# Patient Record
Sex: FEMALE | Race: WHITE | Employment: FULL TIME | URBAN - METROPOLITAN AREA
[De-identification: names, ages, dates, MRNs, and addresses within clinical notes are randomized per-mention and may not be internally consistent; named-entity substitution may affect disease eponyms.]

---

## 2017-02-04 ENCOUNTER — HOSPITAL ENCOUNTER (EMERGENCY)
Age: 27
Discharge: HOME | End: 2017-02-04
Attending: EMERGENCY MEDICINE
Payer: COMMERCIAL

## 2017-02-04 ENCOUNTER — APPOINTMENT (OUTPATIENT)
Dept: GENERAL RADIOLOGY | Age: 27
End: 2017-02-04
Payer: COMMERCIAL

## 2017-02-04 VITALS
DIASTOLIC BLOOD PRESSURE: 79 MMHG | WEIGHT: 140 LBS | OXYGEN SATURATION: 100 % | RESPIRATION RATE: 16 BRPM | TEMPERATURE: 98.2 F | SYSTOLIC BLOOD PRESSURE: 119 MMHG | BODY MASS INDEX: 23.9 KG/M2 | HEART RATE: 72 BPM | HEIGHT: 64 IN

## 2017-02-04 DIAGNOSIS — G89.29 CHRONIC BILATERAL LOW BACK PAIN WITH LEFT-SIDED SCIATICA: Primary | ICD-10-CM

## 2017-02-04 DIAGNOSIS — M54.42 CHRONIC BILATERAL LOW BACK PAIN WITH LEFT-SIDED SCIATICA: Primary | ICD-10-CM

## 2017-02-04 PROCEDURE — 99283 EMERGENCY DEPT VISIT LOW MDM: CPT

## 2017-02-04 PROCEDURE — 6370000000 HC RX 637 (ALT 250 FOR IP): Performed by: PHYSICIAN ASSISTANT

## 2017-02-04 PROCEDURE — 72110 X-RAY EXAM L-2 SPINE 4/>VWS: CPT

## 2017-02-04 PROCEDURE — 72110 X-RAY EXAM L-2 SPINE 4/>VWS: CPT | Performed by: RADIOLOGY

## 2017-02-04 RX ORDER — CYCLOBENZAPRINE HCL 10 MG
10 TABLET ORAL 3 TIMES DAILY PRN
Qty: 15 TABLET | Refills: 0 | Status: SHIPPED | OUTPATIENT
Start: 2017-02-04 | End: 2017-02-14

## 2017-02-04 RX ORDER — HYDROCODONE BITARTRATE AND ACETAMINOPHEN 5; 325 MG/1; MG/1
1 TABLET ORAL EVERY 6 HOURS PRN
Qty: 10 TABLET | Refills: 0 | Status: SHIPPED | OUTPATIENT
Start: 2017-02-04 | End: 2017-02-11

## 2017-02-04 RX ORDER — CYCLOBENZAPRINE HCL 10 MG
10 TABLET ORAL ONCE
Status: COMPLETED | OUTPATIENT
Start: 2017-02-04 | End: 2017-02-04

## 2017-02-04 RX ORDER — HYDROCODONE BITARTRATE AND ACETAMINOPHEN 5; 325 MG/1; MG/1
1 TABLET ORAL ONCE
Status: COMPLETED | OUTPATIENT
Start: 2017-02-04 | End: 2017-02-04

## 2017-02-04 RX ADMIN — CYCLOBENZAPRINE HYDROCHLORIDE 10 MG: 10 TABLET, FILM COATED ORAL at 17:31

## 2017-02-04 RX ADMIN — HYDROCODONE BITARTRATE AND ACETAMINOPHEN 1 TABLET: 5; 325 TABLET ORAL at 17:31

## 2017-02-04 ASSESSMENT — PAIN SCALES - GENERAL
PAINLEVEL_OUTOF10: 7
PAINLEVEL_OUTOF10: 7

## 2017-02-04 ASSESSMENT — PAIN DESCRIPTION - ORIENTATION: ORIENTATION: LOWER;LEFT

## 2017-02-04 ASSESSMENT — PAIN DESCRIPTION - FREQUENCY: FREQUENCY: INTERMITTENT

## 2017-02-04 ASSESSMENT — PAIN DESCRIPTION - PAIN TYPE: TYPE: ACUTE PAIN

## 2017-02-04 ASSESSMENT — PAIN DESCRIPTION - LOCATION: LOCATION: BACK;LEG

## 2017-02-04 ASSESSMENT — PAIN DESCRIPTION - ONSET: ONSET: ON-GOING

## 2017-02-04 ASSESSMENT — PAIN DESCRIPTION - PROGRESSION: CLINICAL_PROGRESSION: GRADUALLY WORSENING

## 2017-04-27 ENCOUNTER — HOSPITAL ENCOUNTER (EMERGENCY)
Age: 27
Discharge: HOME | End: 2017-04-27
Attending: EMERGENCY MEDICINE
Payer: COMMERCIAL

## 2017-04-27 ENCOUNTER — APPOINTMENT (OUTPATIENT)
Dept: GENERAL RADIOLOGY | Age: 27
End: 2017-04-27
Payer: COMMERCIAL

## 2017-04-27 VITALS
RESPIRATION RATE: 16 BRPM | HEART RATE: 94 BPM | OXYGEN SATURATION: 100 % | SYSTOLIC BLOOD PRESSURE: 110 MMHG | DIASTOLIC BLOOD PRESSURE: 70 MMHG | TEMPERATURE: 98.2 F

## 2017-04-27 DIAGNOSIS — M79.644 FINGER PAIN, RIGHT: Primary | ICD-10-CM

## 2017-04-27 PROCEDURE — 99283 EMERGENCY DEPT VISIT LOW MDM: CPT

## 2017-04-27 PROCEDURE — 73130 X-RAY EXAM OF HAND: CPT

## 2017-04-27 ASSESSMENT — PAIN DESCRIPTION - PAIN TYPE: TYPE: ACUTE PAIN

## 2017-04-27 ASSESSMENT — PAIN DESCRIPTION - ORIENTATION: ORIENTATION: RIGHT

## 2017-04-27 ASSESSMENT — PAIN DESCRIPTION - LOCATION: LOCATION: FINGER (COMMENT WHICH ONE)

## 2017-04-27 ASSESSMENT — PAIN SCALES - GENERAL: PAINLEVEL_OUTOF10: 3

## 2017-04-27 ASSESSMENT — PAIN DESCRIPTION - DESCRIPTORS: DESCRIPTORS: ACHING

## 2017-04-27 ASSESSMENT — PAIN DESCRIPTION - ONSET: ONSET: ON-GOING

## 2018-01-03 ENCOUNTER — HOSPITAL ENCOUNTER (EMERGENCY)
Age: 28
Discharge: HOME | End: 2018-01-03
Attending: EMERGENCY MEDICINE
Payer: COMMERCIAL

## 2018-01-03 ENCOUNTER — APPOINTMENT (OUTPATIENT)
Dept: GENERAL RADIOLOGY | Age: 28
End: 2018-01-03
Payer: COMMERCIAL

## 2018-01-03 VITALS
WEIGHT: 140 LBS | HEART RATE: 82 BPM | BODY MASS INDEX: 23.9 KG/M2 | TEMPERATURE: 97.7 F | SYSTOLIC BLOOD PRESSURE: 126 MMHG | HEIGHT: 64 IN | RESPIRATION RATE: 16 BRPM | OXYGEN SATURATION: 100 % | DIASTOLIC BLOOD PRESSURE: 76 MMHG

## 2018-01-03 DIAGNOSIS — S90.32XA CONTUSION OF LEFT FOOT, INITIAL ENCOUNTER: Primary | ICD-10-CM

## 2018-01-03 PROCEDURE — 99283 EMERGENCY DEPT VISIT LOW MDM: CPT

## 2018-01-03 PROCEDURE — 73630 X-RAY EXAM OF FOOT: CPT

## 2018-01-03 RX ORDER — IBUPROFEN 600 MG/1
600 TABLET ORAL EVERY 6 HOURS PRN
Qty: 20 TABLET | Refills: 0 | Status: SHIPPED | OUTPATIENT
Start: 2018-01-03 | End: 2018-03-22

## 2018-01-03 ASSESSMENT — PAIN DESCRIPTION - LOCATION: LOCATION: FOOT

## 2018-01-03 ASSESSMENT — PAIN DESCRIPTION - ORIENTATION: ORIENTATION: LEFT

## 2018-01-03 ASSESSMENT — PAIN DESCRIPTION - DESCRIPTORS: DESCRIPTORS: ACHING

## 2018-01-03 ASSESSMENT — PAIN DESCRIPTION - PAIN TYPE: TYPE: ACUTE PAIN

## 2018-01-03 ASSESSMENT — PAIN SCALES - GENERAL: PAINLEVEL_OUTOF10: 4
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
## 2018-01-23 ENCOUNTER — OFFICE VISIT (OUTPATIENT)
Dept: OBGYN CLINIC | Age: 28
End: 2018-01-23
Payer: COMMERCIAL

## 2018-01-23 ENCOUNTER — HOSPITAL ENCOUNTER (OUTPATIENT)
Age: 28
Setting detail: SPECIMEN
Discharge: HOME | End: 2018-01-23
Payer: COMMERCIAL

## 2018-01-23 VITALS
HEIGHT: 64 IN | SYSTOLIC BLOOD PRESSURE: 121 MMHG | HEART RATE: 75 BPM | RESPIRATION RATE: 18 BRPM | BODY MASS INDEX: 23.22 KG/M2 | DIASTOLIC BLOOD PRESSURE: 71 MMHG | WEIGHT: 136 LBS

## 2018-01-23 DIAGNOSIS — N92.6 MISSED PERIOD: Primary | ICD-10-CM

## 2018-01-23 DIAGNOSIS — Z32.01 POSITIVE PREGNANCY TEST: ICD-10-CM

## 2018-01-23 DIAGNOSIS — N92.6 MISSED PERIOD: ICD-10-CM

## 2018-01-23 LAB — HCG QUANTITATIVE: 107 IU/L

## 2018-01-23 PROCEDURE — 4004F PT TOBACCO SCREEN RCVD TLK: CPT | Performed by: CLINICAL NURSE SPECIALIST

## 2018-01-23 PROCEDURE — 81025 URINE PREGNANCY TEST: CPT | Performed by: CLINICAL NURSE SPECIALIST

## 2018-01-23 PROCEDURE — 99203 OFFICE O/P NEW LOW 30 MIN: CPT | Performed by: CLINICAL NURSE SPECIALIST

## 2018-01-23 PROCEDURE — G8427 DOCREV CUR MEDS BY ELIG CLIN: HCPCS | Performed by: CLINICAL NURSE SPECIALIST

## 2018-01-23 PROCEDURE — G8484 FLU IMMUNIZE NO ADMIN: HCPCS | Performed by: CLINICAL NURSE SPECIALIST

## 2018-01-23 PROCEDURE — G8420 CALC BMI NORM PARAMETERS: HCPCS | Performed by: CLINICAL NURSE SPECIALIST

## 2018-01-23 RX ORDER — VITAMIN A ACETATE, .BETA.-CAROTENE, ASCORBIC ACID, CHOLECALCIFEROL, .ALPHA.-TOCOPHEROL ACETATE, DL-, THIAMINE MONONITRATE, RIBOFLAVIN, NIACINAMIDE, PYRIDOXINE HYDROCHLORIDE, FOLIC ACID, CYANOCOBALAMIN, CALCIUM CARBONATE, FERROUS FUMARATE, ZINC OXIDE, AND CUPRIC OXIDE 2000; 2000; 120; 400; 22; 1.84; 3; 20; 10; 1; 12; 200; 27; 25; 2 [IU]/1; [IU]/1; MG/1; [IU]/1; MG/1; MG/1; MG/1; MG/1; MG/1; MG/1; UG/1; MG/1; MG/1; MG/1; MG/1
1 TABLET ORAL DAILY
Qty: 30 TABLET | Refills: 11 | Status: SHIPPED | OUTPATIENT
Start: 2018-01-23 | End: 2019-10-10

## 2018-01-24 DIAGNOSIS — N92.6 MISSED MENSES: Primary | ICD-10-CM

## 2018-01-24 DIAGNOSIS — Z32.01 POSITIVE PREGNANCY TEST: ICD-10-CM

## 2018-01-25 ENCOUNTER — NURSE ONLY (OUTPATIENT)
Dept: OBGYN CLINIC | Age: 28
End: 2018-01-25

## 2018-01-25 ENCOUNTER — HOSPITAL ENCOUNTER (OUTPATIENT)
Age: 28
Setting detail: SPECIMEN
Discharge: HOME | End: 2018-01-25
Payer: COMMERCIAL

## 2018-01-25 DIAGNOSIS — N92.6 MISSED MENSES: ICD-10-CM

## 2018-01-25 LAB — HCG QUANTITATIVE: 248 IU/L

## 2018-01-29 ENCOUNTER — HOSPITAL ENCOUNTER (OUTPATIENT)
Age: 28
Setting detail: SPECIMEN
Discharge: HOME | End: 2018-01-29
Payer: COMMERCIAL

## 2018-01-29 ENCOUNTER — NURSE ONLY (OUTPATIENT)
Dept: OBGYN CLINIC | Age: 28
End: 2018-01-29

## 2018-01-29 DIAGNOSIS — O20.0 THREATENED ABORTION: Primary | ICD-10-CM

## 2018-01-29 DIAGNOSIS — O20.0 THREATENED ABORTION: ICD-10-CM

## 2018-01-29 LAB — HCG QUANTITATIVE: 1092 IU/L

## 2018-02-05 ENCOUNTER — NURSE ONLY (OUTPATIENT)
Dept: OBGYN CLINIC | Age: 28
End: 2018-02-05

## 2018-02-05 ENCOUNTER — HOSPITAL ENCOUNTER (OUTPATIENT)
Age: 28
Setting detail: SPECIMEN
Discharge: HOME | End: 2018-02-05
Payer: COMMERCIAL

## 2018-02-05 DIAGNOSIS — O20.0 THREATENED ABORTION: Primary | ICD-10-CM

## 2018-02-05 DIAGNOSIS — O20.0 THREATENED ABORTION: ICD-10-CM

## 2018-02-05 LAB — HCG QUANTITATIVE: ABNORMAL IU/L

## 2018-02-07 ENCOUNTER — TELEPHONE (OUTPATIENT)
Dept: OBGYN CLINIC | Age: 28
End: 2018-02-07

## 2018-02-07 RX ORDER — FLUCONAZOLE 100 MG/1
100 TABLET ORAL DAILY
Qty: 7 TABLET | Refills: 0 | Status: SHIPPED | OUTPATIENT
Start: 2018-02-07 | End: 2018-02-14

## 2018-02-07 RX ORDER — METRONIDAZOLE 500 MG/1
500 TABLET ORAL 2 TIMES DAILY
Qty: 14 TABLET | Refills: 0 | Status: SHIPPED | OUTPATIENT
Start: 2018-02-07 | End: 2018-02-14

## 2018-02-12 ENCOUNTER — PROCEDURE VISIT (OUTPATIENT)
Dept: OBGYN CLINIC | Age: 28
End: 2018-02-12
Payer: COMMERCIAL

## 2018-02-12 DIAGNOSIS — Z32.01 POSITIVE PREGNANCY TEST: ICD-10-CM

## 2018-02-12 DIAGNOSIS — O36.80X0 PREGNANCY WITH INCONCLUSIVE FETAL VIABILITY, NOT APPLICABLE OR UNSPECIFIED FETUS: Primary | ICD-10-CM

## 2018-02-12 PROCEDURE — 76817 TRANSVAGINAL US OBSTETRIC: CPT | Performed by: SPECIALIST

## 2018-02-28 ENCOUNTER — TELEPHONE (OUTPATIENT)
Dept: OBGYN CLINIC | Age: 28
End: 2018-02-28

## 2018-02-28 DIAGNOSIS — R05.9 COUGH: ICD-10-CM

## 2018-02-28 DIAGNOSIS — R11.2 NAUSEA AND VOMITING, INTRACTABILITY OF VOMITING NOT SPECIFIED, UNSPECIFIED VOMITING TYPE: Primary | ICD-10-CM

## 2018-02-28 NOTE — TELEPHONE ENCOUNTER
9 wk OB has cough cold non prod, headache, swollen glands in neck, also having morning sickness vomiting temp unknown  Pharm [facility]

## 2018-03-01 RX ORDER — PROMETHAZINE HYDROCHLORIDE 25 MG/1
25 TABLET ORAL EVERY 6 HOURS PRN
Qty: 1 TABLET | Refills: 2 | Status: ON HOLD | OUTPATIENT
Start: 2018-03-01 | End: 2018-06-01 | Stop reason: HOSPADM

## 2018-03-01 RX ORDER — GUAIFENESIN 100 MG/5ML
10 SYRUP ORAL EVERY 4 HOURS PRN
Qty: 1 BOTTLE | Refills: 0 | Status: SHIPPED | OUTPATIENT
Start: 2018-03-01 | End: 2018-07-24 | Stop reason: SDUPTHER

## 2018-03-05 RX ORDER — AZITHROMYCIN 250 MG/1
TABLET, FILM COATED ORAL
Qty: 1 PACKET | Refills: 0 | Status: SHIPPED | OUTPATIENT
Start: 2018-03-05 | End: 2018-03-15

## 2018-03-22 ENCOUNTER — INITIAL PRENATAL (OUTPATIENT)
Dept: OBGYN CLINIC | Age: 28
End: 2018-03-22

## 2018-03-22 ENCOUNTER — HOSPITAL ENCOUNTER (OUTPATIENT)
Age: 28
Setting detail: SPECIMEN
Discharge: HOME | End: 2018-03-22
Payer: COMMERCIAL

## 2018-03-22 VITALS
BODY MASS INDEX: 25.23 KG/M2 | DIASTOLIC BLOOD PRESSURE: 82 MMHG | HEART RATE: 96 BPM | WEIGHT: 147 LBS | SYSTOLIC BLOOD PRESSURE: 129 MMHG

## 2018-03-22 DIAGNOSIS — Z34.82 ENCOUNTER FOR SUPERVISION OF OTHER NORMAL PREGNANCY, SECOND TRIMESTER: Primary | ICD-10-CM

## 2018-03-22 DIAGNOSIS — Z3A.12 12 WEEKS GESTATION OF PREGNANCY: ICD-10-CM

## 2018-03-22 DIAGNOSIS — Z32.01 POSITIVE PREGNANCY TEST: ICD-10-CM

## 2018-03-22 LAB
SICKLE CELL SCREEN: NEGATIVE
TSH SERPL DL<=0.05 MIU/L-ACNC: 1.63 MIU/L (ref 0.3–5)

## 2018-03-22 PROCEDURE — 0502F SUBSEQUENT PRENATAL CARE: CPT | Performed by: SPECIALIST

## 2018-03-22 PROCEDURE — G8419 CALC BMI OUT NRM PARAM NOF/U: HCPCS | Performed by: SPECIALIST

## 2018-03-22 PROCEDURE — G8427 DOCREV CUR MEDS BY ELIG CLIN: HCPCS | Performed by: SPECIALIST

## 2018-03-23 LAB
ABO/RH: NORMAL
ABSOLUTE EOS #: 0.1 K/UL (ref 0–0.44)
ABSOLUTE IMMATURE GRANULOCYTE: 0.03 K/UL (ref 0–0.3)
ABSOLUTE LYMPH #: 2.2 K/UL (ref 1.1–3.7)
ABSOLUTE MONO #: 0.52 K/UL (ref 0.1–1.2)
ANTIBODY SCREEN: NEGATIVE
BASOPHILS # BLD: 1 % (ref 0–2)
BASOPHILS ABSOLUTE: 0.05 K/UL (ref 0–0.2)
CULTURE: NO GROWTH
CULTURE: NORMAL
DIFFERENTIAL TYPE: ABNORMAL
EOSINOPHILS RELATIVE PERCENT: 1 % (ref 1–4)
HCT VFR BLD CALC: 35.1 % (ref 36.3–47.1)
HEMOGLOBIN: 11.3 G/DL (ref 11.9–15.1)
HEPATITIS B SURFACE ANTIGEN: NONREACTIVE
HIV AG/AB: NONREACTIVE
IMMATURE GRANULOCYTES: 0 %
LYMPHOCYTES # BLD: 23 % (ref 24–43)
Lab: NORMAL
MCH RBC QN AUTO: 32.3 PG (ref 25.2–33.5)
MCHC RBC AUTO-ENTMCNC: 32.2 G/DL (ref 28.4–34.8)
MCV RBC AUTO: 100.3 FL (ref 82.6–102.9)
MONOCYTES # BLD: 5 % (ref 3–12)
NRBC AUTOMATED: 0 PER 100 WBC
PDW BLD-RTO: 13.1 % (ref 11.8–14.4)
PLATELET # BLD: 262 K/UL (ref 138–453)
PLATELET ESTIMATE: ABNORMAL
PMV BLD AUTO: 12 FL (ref 8.1–13.5)
RBC # BLD: 3.5 M/UL (ref 3.95–5.11)
RBC # BLD: ABNORMAL 10*6/UL
RUBV IGG SER QL: 92.5 IU/ML
SEG NEUTROPHILS: 70 % (ref 36–65)
SEGMENTED NEUTROPHILS ABSOLUTE COUNT: 6.75 K/UL (ref 1.5–8.1)
SPECIMEN DESCRIPTION: NORMAL
STATUS: NORMAL
T. PALLIDUM, IGG: NONREACTIVE
WBC # BLD: 9.7 K/UL (ref 3.5–11.3)
WBC # BLD: ABNORMAL 10*3/UL

## 2018-03-28 LAB — CYSTIC FIBROSIS: NORMAL

## 2018-03-29 ENCOUNTER — HOSPITAL ENCOUNTER (OUTPATIENT)
Age: 28
Setting detail: SPECIMEN
Discharge: HOME | End: 2018-03-29
Payer: COMMERCIAL

## 2018-03-29 ENCOUNTER — ROUTINE PRENATAL (OUTPATIENT)
Dept: OBGYN CLINIC | Age: 28
End: 2018-03-29

## 2018-03-29 VITALS
WEIGHT: 148 LBS | SYSTOLIC BLOOD PRESSURE: 137 MMHG | BODY MASS INDEX: 25.4 KG/M2 | HEART RATE: 105 BPM | DIASTOLIC BLOOD PRESSURE: 88 MMHG

## 2018-03-29 DIAGNOSIS — Z12.4 CERVICAL CANCER SCREENING: ICD-10-CM

## 2018-03-29 DIAGNOSIS — Z32.01 POSITIVE PREGNANCY TEST: ICD-10-CM

## 2018-03-29 DIAGNOSIS — Z34.82 ENCOUNTER FOR SUPERVISION OF OTHER NORMAL PREGNANCY, SECOND TRIMESTER: Primary | ICD-10-CM

## 2018-03-29 DIAGNOSIS — Z3A.13 13 WEEKS GESTATION OF PREGNANCY: ICD-10-CM

## 2018-03-29 DIAGNOSIS — Z11.3 SCREEN FOR STD (SEXUALLY TRANSMITTED DISEASE): ICD-10-CM

## 2018-03-29 PROCEDURE — 0502F SUBSEQUENT PRENATAL CARE: CPT | Performed by: CLINICAL NURSE SPECIALIST

## 2018-03-29 NOTE — PATIENT INSTRUCTIONS
your chest and breasts may show more. · Don't worry about \"toughening'\" your nipples. Breastfeeding will naturally do this. Where can you learn more? Go to [email or web address] org and sign in to your [employer] account. Enter X041 in the State mental health facility box to learn more about \"Weeks 10 to 14 of Your Pregnancy: Care Instructions. \"     If you do not have an account, please click on the \"Sign Up Now\" link. Current as of: March 16, 2017  Content Version: 11.5  © 9746-3097 [employer]. Care instructions adapted under license by [facility] ([facility]). If you have questions about a medical condition or this instruction, always ask your healthcare professional. [place] [address] any warranty or liability for your use of this information. Patient Education        Learning About When to Call Your Doctor During Pregnancy (Up to 20 Weeks)  Your Care Instructions    It's common to have concerns about what might be a problem during pregnancy. Although most pregnant women don't have any serious problems, it's important to know when to call your doctor if you have certain symptoms. These are general suggestions. Your doctor may give you some more information about when to call. When to call your doctor (up to 20 weeks)  Call 911 anytime you think you may need emergency care. For example, call if:  · You passed out (lost consciousness). Call your doctor now or seek immediate medical care if:  · You have a fever. · You have vaginal bleeding. · You are dizzy or lightheaded, or you feel like you may faint. · You have symptoms of a urinary tract infection. These may include:  ¨ Pain or burning when you urinate. ¨ A frequent need to urinate without being able to pass much urine. ¨ Pain in the flank, which is just below the rib cage and above the waist on either side of the back. ¨ Blood in your urine. · You have belly pain. · You think you are having contractions.   · You have a sudden release of fluid from your vagina. Watch closely for changes in your health, and be sure to contact your doctor if:  · You have vaginal discharge that smells bad. · You have other concerns about your pregnancy. Follow-up care is a key part of your treatment and safety. Be sure to make and go to all appointments, and call your doctor if you are having problems. It's also a good idea to know your test results and keep a list of the medicines you take. Where can you learn more? Go to https://chpepiceweb.[employer]. org and sign in to your [employer] account. Enter A659 in the [employer] box to learn more about \"Learning About When to Call Your Doctor During Pregnancy (Up to 20 Weeks). \"     If you do not have an account, please click on the \"Sign Up Now\" link. Current as of: March 16, 2017  Content Version: 11.5  © 3263-7034 [employer], [employer]. Care instructions adapted under license by [facility] ([facility]). If you have questions about a medical condition or this instruction, always ask your healthcare professional. [place] [address] any warranty or liability for your use of this information.

## 2018-03-30 LAB
C TRACH DNA GENITAL QL NAA+PROBE: ABNORMAL
N. GONORRHOEAE DNA: NEGATIVE

## 2018-04-03 ENCOUNTER — TELEPHONE (OUTPATIENT)
Dept: OBGYN CLINIC | Age: 28
End: 2018-04-03

## 2018-04-03 DIAGNOSIS — A64 STD (SEXUALLY TRANSMITTED DISEASE): Primary | ICD-10-CM

## 2018-04-03 DIAGNOSIS — A74.9 CHLAMYDIA: Primary | ICD-10-CM

## 2018-04-03 RX ORDER — AZITHROMYCIN 500 MG/1
1000 TABLET, FILM COATED ORAL ONCE
Qty: 2 TABLET | Refills: 0 | Status: SHIPPED | OUTPATIENT
Start: 2018-04-03 | End: 2018-04-03

## 2018-04-03 RX ORDER — AZITHROMYCIN 500 MG/1
1000 TABLET, FILM COATED ORAL ONCE
Qty: 2 TABLET | Refills: 0 | Status: CANCELLED | OUTPATIENT
Start: 2018-04-03 | End: 2018-04-03

## 2018-04-05 ENCOUNTER — ROUTINE PRENATAL (OUTPATIENT)
Dept: OBGYN CLINIC | Age: 28
End: 2018-04-05

## 2018-04-05 VITALS
SYSTOLIC BLOOD PRESSURE: 115 MMHG | BODY MASS INDEX: 25.92 KG/M2 | DIASTOLIC BLOOD PRESSURE: 69 MMHG | WEIGHT: 151 LBS | HEART RATE: 81 BPM

## 2018-04-05 DIAGNOSIS — Z34.82 ENCOUNTER FOR SUPERVISION OF OTHER NORMAL PREGNANCY, SECOND TRIMESTER: Primary | ICD-10-CM

## 2018-04-05 DIAGNOSIS — Z3A.14 14 WEEKS GESTATION OF PREGNANCY: ICD-10-CM

## 2018-04-05 PROCEDURE — 0502F SUBSEQUENT PRENATAL CARE: CPT | Performed by: SPECIALIST

## 2018-04-12 ENCOUNTER — TELEPHONE (OUTPATIENT)
Dept: OBGYN CLINIC | Age: 28
End: 2018-04-12

## 2018-04-12 RX ORDER — METRONIDAZOLE 500 MG/1
500 TABLET ORAL 2 TIMES DAILY
Qty: 14 TABLET | Refills: 0 | Status: SHIPPED | OUTPATIENT
Start: 2018-04-12 | End: 2018-04-19

## 2018-04-12 RX ORDER — FLUCONAZOLE 100 MG/1
100 TABLET ORAL DAILY
Qty: 7 TABLET | Refills: 0 | Status: SHIPPED | OUTPATIENT
Start: 2018-04-12 | End: 2018-04-19

## 2018-04-14 LAB — CYTOLOGY REPORT: NORMAL

## 2018-04-17 ENCOUNTER — PROCEDURE VISIT (OUTPATIENT)
Dept: OBGYN CLINIC | Age: 28
End: 2018-04-17
Payer: COMMERCIAL

## 2018-04-17 ENCOUNTER — ROUTINE PRENATAL (OUTPATIENT)
Dept: OBGYN CLINIC | Age: 28
End: 2018-04-17

## 2018-04-17 VITALS
SYSTOLIC BLOOD PRESSURE: 130 MMHG | HEART RATE: 92 BPM | WEIGHT: 150 LBS | BODY MASS INDEX: 25.75 KG/M2 | DIASTOLIC BLOOD PRESSURE: 80 MMHG

## 2018-04-17 DIAGNOSIS — O09.213 PREVIOUS PRETERM DELIVERY IN THIRD TRIMESTER, ANTEPARTUM: Primary | ICD-10-CM

## 2018-04-17 DIAGNOSIS — Z34.82 PRENATAL CARE, SUBSEQUENT PREGNANCY, SECOND TRIMESTER: Primary | ICD-10-CM

## 2018-04-17 DIAGNOSIS — Z3A.16 16 WEEKS GESTATION OF PREGNANCY: ICD-10-CM

## 2018-04-17 LAB
ABDOMINAL CIRCUMFERENCE: NORMAL CM
BIPARIETAL DIAMETER: NORMAL CM
ESTIMATED FETAL WEIGHT: NORMAL GRAMS
FEMORAL DIAMETER: NORMAL CM
HC/AC: NORMAL
HEAD CIRCUMFERENCE: NORMAL CM

## 2018-04-17 PROCEDURE — 0502F SUBSEQUENT PRENATAL CARE: CPT | Performed by: SPECIALIST

## 2018-04-17 PROCEDURE — 76817 TRANSVAGINAL US OBSTETRIC: CPT | Performed by: SPECIALIST

## 2018-04-18 PROBLEM — Z34.82 PRENATAL CARE, SUBSEQUENT PREGNANCY, SECOND TRIMESTER: Status: ACTIVE | Noted: 2018-04-18

## 2018-04-26 ENCOUNTER — ROUTINE PRENATAL (OUTPATIENT)
Dept: OBGYN CLINIC | Age: 28
End: 2018-04-26

## 2018-04-26 VITALS
DIASTOLIC BLOOD PRESSURE: 69 MMHG | HEART RATE: 83 BPM | SYSTOLIC BLOOD PRESSURE: 112 MMHG | WEIGHT: 155 LBS | BODY MASS INDEX: 26.61 KG/M2

## 2018-04-26 DIAGNOSIS — Z3A.17 17 WEEKS GESTATION OF PREGNANCY: ICD-10-CM

## 2018-04-26 DIAGNOSIS — O09.212 PREVIOUS PRETERM DELIVERY IN SECOND TRIMESTER, ANTEPARTUM: ICD-10-CM

## 2018-04-26 DIAGNOSIS — O09.92 HIGH-RISK PREGNANCY IN SECOND TRIMESTER: Primary | ICD-10-CM

## 2018-04-26 PROCEDURE — 0502F SUBSEQUENT PRENATAL CARE: CPT | Performed by: SPECIALIST

## 2018-04-30 ENCOUNTER — PROCEDURE VISIT (OUTPATIENT)
Dept: OBGYN CLINIC | Age: 28
End: 2018-04-30
Payer: COMMERCIAL

## 2018-04-30 ENCOUNTER — ROUTINE PRENATAL (OUTPATIENT)
Dept: OBGYN CLINIC | Age: 28
End: 2018-04-30

## 2018-04-30 VITALS
DIASTOLIC BLOOD PRESSURE: 76 MMHG | HEART RATE: 86 BPM | SYSTOLIC BLOOD PRESSURE: 136 MMHG | BODY MASS INDEX: 26.61 KG/M2 | WEIGHT: 155 LBS

## 2018-04-30 DIAGNOSIS — Z3A.17 17 WEEKS GESTATION OF PREGNANCY: ICD-10-CM

## 2018-04-30 DIAGNOSIS — O09.213 PREVIOUS PRETERM DELIVERY IN THIRD TRIMESTER, ANTEPARTUM: Primary | ICD-10-CM

## 2018-04-30 DIAGNOSIS — Z34.82 PRENATAL CARE, SUBSEQUENT PREGNANCY, SECOND TRIMESTER: Primary | ICD-10-CM

## 2018-04-30 LAB
ABDOMINAL CIRCUMFERENCE: NORMAL CM
ABDOMINAL CIRCUMFERENCE: NORMAL CM
BIPARIETAL DIAMETER: NORMAL CM
BIPARIETAL DIAMETER: NORMAL CM
ESTIMATED FETAL WEIGHT: NORMAL GRAMS
ESTIMATED FETAL WEIGHT: NORMAL GRAMS
FEMORAL DIAMETER: NORMAL CM
FEMORAL DIAMETER: NORMAL CM
HC/AC: NORMAL
HC/AC: NORMAL
HEAD CIRCUMFERENCE: NORMAL CM
HEAD CIRCUMFERENCE: NORMAL CM

## 2018-04-30 PROCEDURE — 0502F SUBSEQUENT PRENATAL CARE: CPT | Performed by: SPECIALIST

## 2018-04-30 PROCEDURE — 76817 TRANSVAGINAL US OBSTETRIC: CPT | Performed by: SPECIALIST

## 2018-04-30 PROCEDURE — 76816 OB US FOLLOW-UP PER FETUS: CPT | Performed by: SPECIALIST

## 2018-05-07 ENCOUNTER — ROUTINE PRENATAL (OUTPATIENT)
Dept: OBGYN CLINIC | Age: 28
End: 2018-05-07

## 2018-05-07 VITALS
DIASTOLIC BLOOD PRESSURE: 77 MMHG | WEIGHT: 154 LBS | BODY MASS INDEX: 26.43 KG/M2 | HEART RATE: 99 BPM | SYSTOLIC BLOOD PRESSURE: 128 MMHG

## 2018-05-07 DIAGNOSIS — Z34.82 ENCOUNTER FOR SUPERVISION OF OTHER NORMAL PREGNANCY, SECOND TRIMESTER: ICD-10-CM

## 2018-05-07 DIAGNOSIS — Z34.82 PRENATAL CARE, SUBSEQUENT PREGNANCY, SECOND TRIMESTER: Primary | ICD-10-CM

## 2018-05-07 DIAGNOSIS — Z3A.18 18 WEEKS GESTATION OF PREGNANCY: ICD-10-CM

## 2018-05-07 PROCEDURE — 0502F SUBSEQUENT PRENATAL CARE: CPT | Performed by: SPECIALIST

## 2018-05-14 ENCOUNTER — TELEPHONE (OUTPATIENT)
Dept: OBGYN CLINIC | Age: 28
End: 2018-05-14

## 2018-05-15 ENCOUNTER — PROCEDURE VISIT (OUTPATIENT)
Dept: OBGYN CLINIC | Age: 28
End: 2018-05-15
Payer: COMMERCIAL

## 2018-05-15 ENCOUNTER — ROUTINE PRENATAL (OUTPATIENT)
Dept: OBGYN CLINIC | Age: 28
End: 2018-05-15

## 2018-05-15 VITALS
DIASTOLIC BLOOD PRESSURE: 60 MMHG | HEART RATE: 89 BPM | SYSTOLIC BLOOD PRESSURE: 116 MMHG | WEIGHT: 153 LBS | BODY MASS INDEX: 26.26 KG/M2

## 2018-05-15 DIAGNOSIS — O09.92 PREGNANCY, SUPERVISION, HIGH-RISK, SECOND TRIMESTER: Primary | ICD-10-CM

## 2018-05-15 DIAGNOSIS — O09.212 PREVIOUS PRETERM DELIVERY IN SECOND TRIMESTER, ANTEPARTUM: ICD-10-CM

## 2018-05-15 DIAGNOSIS — O09.213 PREVIOUS PRETERM DELIVERY IN THIRD TRIMESTER, ANTEPARTUM: ICD-10-CM

## 2018-05-15 DIAGNOSIS — Z3A.20 20 WEEKS GESTATION OF PREGNANCY: ICD-10-CM

## 2018-05-15 PROCEDURE — 0502F SUBSEQUENT PRENATAL CARE: CPT | Performed by: SPECIALIST

## 2018-05-15 PROCEDURE — 76817 TRANSVAGINAL US OBSTETRIC: CPT | Performed by: SPECIALIST

## 2018-05-16 ENCOUNTER — TELEPHONE (OUTPATIENT)
Dept: OBGYN CLINIC | Age: 28
End: 2018-05-16

## 2018-05-22 ENCOUNTER — PROCEDURE VISIT (OUTPATIENT)
Dept: OBGYN CLINIC | Age: 28
End: 2018-05-22
Payer: COMMERCIAL

## 2018-05-22 ENCOUNTER — ROUTINE PRENATAL (OUTPATIENT)
Dept: OBGYN CLINIC | Age: 28
End: 2018-05-22

## 2018-05-22 VITALS
WEIGHT: 156 LBS | SYSTOLIC BLOOD PRESSURE: 140 MMHG | BODY MASS INDEX: 26.78 KG/M2 | HEART RATE: 89 BPM | DIASTOLIC BLOOD PRESSURE: 90 MMHG

## 2018-05-22 DIAGNOSIS — O09.92 ENCOUNTER FOR SUPERVISION OF HIGH RISK PREGNANCY IN SECOND TRIMESTER, ANTEPARTUM: Primary | ICD-10-CM

## 2018-05-22 DIAGNOSIS — Z3A.21 21 WEEKS GESTATION OF PREGNANCY: ICD-10-CM

## 2018-05-22 DIAGNOSIS — Z36.89 SCREENING, ANTENATAL, FOR FETAL ANATOMIC SURVEY: Primary | ICD-10-CM

## 2018-05-22 DIAGNOSIS — O26.872 SHORT CERVICAL LENGTH DURING PREGNANCY IN SECOND TRIMESTER: ICD-10-CM

## 2018-05-22 DIAGNOSIS — O09.213 PREVIOUS PRETERM DELIVERY IN THIRD TRIMESTER, ANTEPARTUM: ICD-10-CM

## 2018-05-22 LAB
ABDOMINAL CIRCUMFERENCE: NORMAL CM
ABDOMINAL CIRCUMFERENCE: NORMAL CM
BIPARIETAL DIAMETER: NORMAL CM
BIPARIETAL DIAMETER: NORMAL CM
ESTIMATED FETAL WEIGHT: NORMAL GRAMS
ESTIMATED FETAL WEIGHT: NORMAL GRAMS
FEMORAL DIAMETER: NORMAL CM
FEMORAL DIAMETER: NORMAL CM
FEMORAL LENGTH: NORMAL CM
HC/AC: NORMAL
HC/AC: NORMAL
HEAD CIRCUMFERENCE: NORMAL CM
HEAD CIRCUMFERENCE: NORMAL CM

## 2018-05-22 PROCEDURE — 0502F SUBSEQUENT PRENATAL CARE: CPT | Performed by: CLINICAL NURSE SPECIALIST

## 2018-05-22 PROCEDURE — 76817 TRANSVAGINAL US OBSTETRIC: CPT | Performed by: SPECIALIST

## 2018-05-22 PROCEDURE — 76805 OB US >/= 14 WKS SNGL FETUS: CPT | Performed by: SPECIALIST

## 2018-05-27 ENCOUNTER — HOSPITAL ENCOUNTER (OUTPATIENT)
Age: 28
Discharge: HOME | End: 2018-05-27
Attending: SPECIALIST | Admitting: SPECIALIST
Payer: COMMERCIAL

## 2018-05-27 VITALS
TEMPERATURE: 99 F | DIASTOLIC BLOOD PRESSURE: 65 MMHG | HEART RATE: 97 BPM | SYSTOLIC BLOOD PRESSURE: 119 MMHG | RESPIRATION RATE: 18 BRPM

## 2018-05-27 PROBLEM — O09.92 PREGNANCY, SUPERVISION, HIGH-RISK, SECOND TRIMESTER: Status: RESOLVED | Noted: 2018-05-15 | Resolved: 2018-05-27

## 2018-05-27 PROBLEM — Z33.1 IUP (INTRAUTERINE PREGNANCY), INCIDENTAL: Status: RESOLVED | Noted: 2018-05-27 | Resolved: 2018-05-27

## 2018-05-27 PROBLEM — Z33.1 IUP (INTRAUTERINE PREGNANCY), INCIDENTAL: Status: ACTIVE | Noted: 2018-05-27

## 2018-05-27 PROCEDURE — 99213 OFFICE O/P EST LOW 20 MIN: CPT

## 2018-05-27 RX ORDER — ACETAMINOPHEN 500 MG
1000 TABLET ORAL EVERY 6 HOURS PRN
COMMUNITY
End: 2020-07-01 | Stop reason: CLARIF

## 2018-05-27 RX ORDER — HYDROXYPROGESTERONE CAPROATE 250 MG/ML
250 INJECTION INTRAMUSCULAR WEEKLY
Status: ON HOLD | COMMUNITY
Start: 2018-05-17 | End: 2018-08-29 | Stop reason: HOSPADM

## 2018-05-27 RX ORDER — PROGESTERONE 50 MG/ML
250 INJECTION, SOLUTION INTRAMUSCULAR DAILY
Status: ON HOLD | COMMUNITY
End: 2018-05-27 | Stop reason: SDUPTHER

## 2018-05-31 ENCOUNTER — PROCEDURE VISIT (OUTPATIENT)
Dept: OBGYN CLINIC | Age: 28
End: 2018-05-31
Payer: COMMERCIAL

## 2018-05-31 ENCOUNTER — HOSPITAL ENCOUNTER (INPATIENT)
Age: 28
LOS: 1 days | Discharge: HOME | DRG: 781 | End: 2018-06-01
Attending: SPECIALIST | Admitting: SPECIALIST
Payer: COMMERCIAL

## 2018-05-31 ENCOUNTER — ROUTINE PRENATAL (OUTPATIENT)
Dept: OBGYN CLINIC | Age: 28
End: 2018-05-31

## 2018-05-31 VITALS
WEIGHT: 159 LBS | DIASTOLIC BLOOD PRESSURE: 70 MMHG | SYSTOLIC BLOOD PRESSURE: 117 MMHG | BODY MASS INDEX: 27.29 KG/M2 | HEART RATE: 84 BPM

## 2018-05-31 DIAGNOSIS — O26.872 SHORT CERVIX DURING PREGNANCY IN SECOND TRIMESTER: ICD-10-CM

## 2018-05-31 DIAGNOSIS — Z3A.22 22 WEEKS GESTATION OF PREGNANCY: Primary | ICD-10-CM

## 2018-05-31 DIAGNOSIS — O09.212 PREVIOUS PRETERM DELIVERY IN SECOND TRIMESTER, ANTEPARTUM: Primary | ICD-10-CM

## 2018-05-31 DIAGNOSIS — Z3A.22 22 WEEKS GESTATION OF PREGNANCY: ICD-10-CM

## 2018-05-31 DIAGNOSIS — O09.213 PREVIOUS PRETERM DELIVERY IN THIRD TRIMESTER, ANTEPARTUM: ICD-10-CM

## 2018-05-31 DIAGNOSIS — O09.212 PREVIOUS PRETERM DELIVERY IN SECOND TRIMESTER, ANTEPARTUM: ICD-10-CM

## 2018-05-31 DIAGNOSIS — Z34.82 PRENATAL CARE, SUBSEQUENT PREGNANCY, SECOND TRIMESTER: ICD-10-CM

## 2018-05-31 DIAGNOSIS — O09.92 HIGH-RISK PREGNANCY IN SECOND TRIMESTER: ICD-10-CM

## 2018-05-31 PROBLEM — N88.3 SHORT CERVIX: Status: ACTIVE | Noted: 2018-05-31

## 2018-05-31 LAB
ABDOMINAL CIRCUMFERENCE: NORMAL CM
ABO/RH: NORMAL
ABSOLUTE EOS #: 0.3 K/UL (ref 0–0.4)
ABSOLUTE IMMATURE GRANULOCYTE: ABNORMAL K/UL (ref 0–0.3)
ABSOLUTE LYMPH #: 3 K/UL (ref 1–4.8)
ABSOLUTE MONO #: 0.7 K/UL (ref 0.1–1.3)
ALBUMIN SERPL-MCNC: 3.5 G/DL (ref 3.5–5.2)
ALBUMIN/GLOBULIN RATIO: ABNORMAL (ref 1–2.5)
ALP BLD-CCNC: 62 U/L (ref 35–104)
ALT SERPL-CCNC: 9 U/L (ref 5–33)
ANION GAP SERPL CALCULATED.3IONS-SCNC: 14 MMOL/L (ref 9–17)
ANTIBODY SCREEN: NEGATIVE
ARM BAND NUMBER: NORMAL
AST SERPL-CCNC: 14 U/L
BASOPHILS # BLD: 0 % (ref 0–2)
BASOPHILS ABSOLUTE: 0.1 K/UL (ref 0–0.2)
BILIRUB SERPL-MCNC: <0.15 MG/DL (ref 0.3–1.2)
BIPARIETAL DIAMETER: NORMAL CM
BUN BLDV-MCNC: 11 MG/DL (ref 6–20)
BUN/CREAT BLD: ABNORMAL (ref 9–20)
CALCIUM SERPL-MCNC: 8.9 MG/DL (ref 8.6–10.4)
CHLORIDE BLD-SCNC: 104 MMOL/L (ref 98–107)
CO2: 20 MMOL/L (ref 20–31)
CREAT SERPL-MCNC: <0.4 MG/DL (ref 0.5–0.9)
DIFFERENTIAL TYPE: ABNORMAL
EOSINOPHILS RELATIVE PERCENT: 2 % (ref 0–4)
ESTIMATED FETAL WEIGHT: NORMAL GRAMS
EXPIRATION DATE: NORMAL
FEMORAL DIAMETER: NORMAL CM
GFR AFRICAN AMERICAN: ABNORMAL ML/MIN
GFR NON-AFRICAN AMERICAN: ABNORMAL ML/MIN
GFR SERPL CREATININE-BSD FRML MDRD: ABNORMAL ML/MIN/{1.73_M2}
GFR SERPL CREATININE-BSD FRML MDRD: ABNORMAL ML/MIN/{1.73_M2}
GLUCOSE BLD-MCNC: 92 MG/DL (ref 70–99)
HC/AC: NORMAL
HCT VFR BLD CALC: 28.9 % (ref 36–46)
HEAD CIRCUMFERENCE: NORMAL CM
HEMOGLOBIN: 10.2 G/DL (ref 12–16)
IMMATURE GRANULOCYTES: ABNORMAL %
LYMPHOCYTES # BLD: 22 % (ref 24–44)
MCH RBC QN AUTO: 34.2 PG (ref 26–34)
MCHC RBC AUTO-ENTMCNC: 35.3 G/DL (ref 31–37)
MCV RBC AUTO: 96.9 FL (ref 80–100)
MONOCYTES # BLD: 5 % (ref 1–7)
NRBC AUTOMATED: ABNORMAL PER 100 WBC
PDW BLD-RTO: 12.9 % (ref 11.5–14.9)
PLATELET # BLD: 227 K/UL (ref 150–450)
PLATELET ESTIMATE: ABNORMAL
PMV BLD AUTO: 9.3 FL (ref 6–12)
POTASSIUM SERPL-SCNC: 4.2 MMOL/L (ref 3.7–5.3)
RBC # BLD: 2.98 M/UL (ref 4–5.2)
RBC # BLD: ABNORMAL 10*6/UL
SEG NEUTROPHILS: 71 % (ref 36–66)
SEGMENTED NEUTROPHILS ABSOLUTE COUNT: 10 K/UL (ref 1.3–9.1)
SODIUM BLD-SCNC: 138 MMOL/L (ref 135–144)
TOTAL PROTEIN: 6 G/DL (ref 6.4–8.3)
WBC # BLD: 14 K/UL (ref 3.5–11)
WBC # BLD: ABNORMAL 10*3/UL

## 2018-05-31 PROCEDURE — 1220000000 HC SEMI PRIVATE OB R&B

## 2018-05-31 PROCEDURE — 86900 BLOOD TYPING SEROLOGIC ABO: CPT

## 2018-05-31 PROCEDURE — 86901 BLOOD TYPING SEROLOGIC RH(D): CPT

## 2018-05-31 PROCEDURE — 80053 COMPREHEN METABOLIC PANEL: CPT

## 2018-05-31 PROCEDURE — 2580000003 HC RX 258: Performed by: STUDENT IN AN ORGANIZED HEALTH CARE EDUCATION/TRAINING PROGRAM

## 2018-05-31 PROCEDURE — 86850 RBC ANTIBODY SCREEN: CPT

## 2018-05-31 PROCEDURE — 76817 TRANSVAGINAL US OBSTETRIC: CPT | Performed by: SPECIALIST

## 2018-05-31 PROCEDURE — 0502F SUBSEQUENT PRENATAL CARE: CPT | Performed by: SPECIALIST

## 2018-05-31 PROCEDURE — 81001 URINALYSIS AUTO W/SCOPE: CPT

## 2018-05-31 PROCEDURE — 36415 COLL VENOUS BLD VENIPUNCTURE: CPT

## 2018-05-31 PROCEDURE — 59320 REVISION OF CERVIX: CPT | Performed by: SPECIALIST

## 2018-05-31 PROCEDURE — 85025 COMPLETE CBC W/AUTO DIFF WBC: CPT

## 2018-05-31 RX ORDER — ACETAMINOPHEN 500 MG
1000 TABLET ORAL ONCE
Status: DISCONTINUED | OUTPATIENT
Start: 2018-05-31 | End: 2018-05-31

## 2018-05-31 RX ORDER — SODIUM CHLORIDE, SODIUM LACTATE, POTASSIUM CHLORIDE, CALCIUM CHLORIDE 600; 310; 30; 20 MG/100ML; MG/100ML; MG/100ML; MG/100ML
INJECTION, SOLUTION INTRAVENOUS CONTINUOUS
Status: DISCONTINUED | OUTPATIENT
Start: 2018-05-31 | End: 2018-06-01 | Stop reason: HOSPADM

## 2018-05-31 RX ADMIN — SODIUM CHLORIDE, POTASSIUM CHLORIDE, SODIUM LACTATE AND CALCIUM CHLORIDE: 600; 310; 30; 20 INJECTION, SOLUTION INTRAVENOUS at 21:20

## 2018-06-01 ENCOUNTER — ANESTHESIA (OUTPATIENT)
Dept: OPERATING ROOM | Age: 28
DRG: 781 | End: 2018-06-01
Payer: COMMERCIAL

## 2018-06-01 ENCOUNTER — ANESTHESIA EVENT (OUTPATIENT)
Dept: OPERATING ROOM | Age: 28
DRG: 781 | End: 2018-06-01
Payer: COMMERCIAL

## 2018-06-01 VITALS
DIASTOLIC BLOOD PRESSURE: 57 MMHG | HEIGHT: 63 IN | RESPIRATION RATE: 14 BRPM | HEART RATE: 78 BPM | WEIGHT: 158 LBS | SYSTOLIC BLOOD PRESSURE: 100 MMHG | OXYGEN SATURATION: 98 % | BODY MASS INDEX: 28 KG/M2 | TEMPERATURE: 98.8 F

## 2018-06-01 VITALS
RESPIRATION RATE: 22 BRPM | OXYGEN SATURATION: 99 % | SYSTOLIC BLOOD PRESSURE: 107 MMHG | DIASTOLIC BLOOD PRESSURE: 56 MMHG

## 2018-06-01 PROBLEM — Z98.890 POST-OPERATIVE STATE: Status: ACTIVE | Noted: 2018-06-01

## 2018-06-01 LAB
-: ABNORMAL
AMORPHOUS: ABNORMAL
BACTERIA: ABNORMAL
BILIRUBIN URINE: NEGATIVE
CASTS UA: ABNORMAL /LPF
COLOR: YELLOW
COMMENT UA: ABNORMAL
CRYSTALS, UA: ABNORMAL /HPF
EPITHELIAL CELLS UA: ABNORMAL /HPF
GLUCOSE URINE: NEGATIVE
KETONES, URINE: NEGATIVE
LEUKOCYTE ESTERASE, URINE: NEGATIVE
MUCUS: ABNORMAL
NITRITE, URINE: NEGATIVE
OTHER OBSERVATIONS UA: ABNORMAL
PH UA: 7.5 (ref 5–8)
PROTEIN UA: NEGATIVE
RBC UA: ABNORMAL /HPF
RENAL EPITHELIAL, UA: ABNORMAL /HPF
SPECIFIC GRAVITY UA: 1.02 (ref 1–1.03)
TRICHOMONAS: ABNORMAL
TURBIDITY: ABNORMAL
URINE HGB: NEGATIVE
UROBILINOGEN, URINE: NORMAL
WBC UA: ABNORMAL /HPF
YEAST: ABNORMAL

## 2018-06-01 PROCEDURE — 3600000002 HC SURGERY LEVEL 2 BASE: Performed by: SPECIALIST

## 2018-06-01 PROCEDURE — 3600000012 HC SURGERY LEVEL 2 ADDTL 15MIN: Performed by: SPECIALIST

## 2018-06-01 PROCEDURE — 0UVC7ZZ RESTRICTION OF CERVIX, VIA NATURAL OR ARTIFICIAL OPENING: ICD-10-PCS | Performed by: SPECIALIST

## 2018-06-01 PROCEDURE — 2580000003 HC RX 258: Performed by: STUDENT IN AN ORGANIZED HEALTH CARE EDUCATION/TRAINING PROGRAM

## 2018-06-01 PROCEDURE — 2580000003 HC RX 258: Performed by: NURSE ANESTHETIST, CERTIFIED REGISTERED

## 2018-06-01 PROCEDURE — 3700000001 HC ADD 15 MINUTES (ANESTHESIA): Performed by: SPECIALIST

## 2018-06-01 PROCEDURE — 7100000001 HC PACU RECOVERY - ADDTL 15 MIN: Performed by: SPECIALIST

## 2018-06-01 PROCEDURE — 3700000000 HC ANESTHESIA ATTENDED CARE: Performed by: SPECIALIST

## 2018-06-01 PROCEDURE — 7100000000 HC PACU RECOVERY - FIRST 15 MIN: Performed by: SPECIALIST

## 2018-06-01 PROCEDURE — 2500000003 HC RX 250 WO HCPCS: Performed by: NURSE ANESTHETIST, CERTIFIED REGISTERED

## 2018-06-01 RX ORDER — DIPHENHYDRAMINE HYDROCHLORIDE 50 MG/ML
12.5 INJECTION INTRAMUSCULAR; INTRAVENOUS
Status: DISCONTINUED | OUTPATIENT
Start: 2018-06-01 | End: 2018-06-01 | Stop reason: HOSPADM

## 2018-06-01 RX ORDER — HYDRALAZINE HYDROCHLORIDE 20 MG/ML
5 INJECTION INTRAMUSCULAR; INTRAVENOUS EVERY 10 MIN PRN
Status: DISCONTINUED | OUTPATIENT
Start: 2018-06-01 | End: 2018-06-01 | Stop reason: HOSPADM

## 2018-06-01 RX ORDER — BUPIVACAINE HYDROCHLORIDE 7.5 MG/ML
INJECTION, SOLUTION INTRASPINAL PRN
Status: DISCONTINUED | OUTPATIENT
Start: 2018-06-01 | End: 2018-06-01 | Stop reason: SDUPTHER

## 2018-06-01 RX ORDER — FENTANYL CITRATE 50 UG/ML
25 INJECTION, SOLUTION INTRAMUSCULAR; INTRAVENOUS EVERY 5 MIN PRN
Status: DISCONTINUED | OUTPATIENT
Start: 2018-06-01 | End: 2018-06-01 | Stop reason: HOSPADM

## 2018-06-01 RX ORDER — LIDOCAINE HYDROCHLORIDE 10 MG/ML
INJECTION, SOLUTION INFILTRATION; PERINEURAL PRN
Status: DISCONTINUED | OUTPATIENT
Start: 2018-06-01 | End: 2018-06-01 | Stop reason: SDUPTHER

## 2018-06-01 RX ORDER — PROMETHAZINE HYDROCHLORIDE 25 MG/ML
6.25 INJECTION, SOLUTION INTRAMUSCULAR; INTRAVENOUS
Status: DISCONTINUED | OUTPATIENT
Start: 2018-06-01 | End: 2018-06-01 | Stop reason: HOSPADM

## 2018-06-01 RX ORDER — 0.9 % SODIUM CHLORIDE 0.9 %
500 INTRAVENOUS SOLUTION INTRAVENOUS
Status: DISCONTINUED | OUTPATIENT
Start: 2018-06-01 | End: 2018-06-01 | Stop reason: HOSPADM

## 2018-06-01 RX ORDER — ACETAMINOPHEN 500 MG
1000 TABLET ORAL EVERY 6 HOURS PRN
Status: DISCONTINUED | OUTPATIENT
Start: 2018-06-01 | End: 2018-06-01 | Stop reason: HOSPADM

## 2018-06-01 RX ORDER — SODIUM CHLORIDE, SODIUM LACTATE, POTASSIUM CHLORIDE, CALCIUM CHLORIDE 600; 310; 30; 20 MG/100ML; MG/100ML; MG/100ML; MG/100ML
INJECTION, SOLUTION INTRAVENOUS CONTINUOUS PRN
Status: DISCONTINUED | OUTPATIENT
Start: 2018-06-01 | End: 2018-06-01 | Stop reason: SDUPTHER

## 2018-06-01 RX ADMIN — LIDOCAINE HYDROCHLORIDE 30 MG: 10 INJECTION, SOLUTION INFILTRATION; PERINEURAL at 08:03

## 2018-06-01 RX ADMIN — BUPIVACAINE HYDROCHLORIDE IN DEXTROSE 1.6 ML: 7.5 INJECTION, SOLUTION SUBARACHNOID at 08:03

## 2018-06-01 RX ADMIN — SODIUM CHLORIDE, POTASSIUM CHLORIDE, SODIUM LACTATE AND CALCIUM CHLORIDE: 600; 310; 30; 20 INJECTION, SOLUTION INTRAVENOUS at 04:26

## 2018-06-01 RX ADMIN — SODIUM CHLORIDE, POTASSIUM CHLORIDE, SODIUM LACTATE AND CALCIUM CHLORIDE: 600; 310; 30; 20 INJECTION, SOLUTION INTRAVENOUS at 07:54

## 2018-06-01 ASSESSMENT — PULMONARY FUNCTION TESTS
PIF_VALUE: 1

## 2018-06-01 ASSESSMENT — PAIN SCALES - GENERAL
PAINLEVEL_OUTOF10: 0

## 2018-06-01 ASSESSMENT — PAIN - FUNCTIONAL ASSESSMENT: PAIN_FUNCTIONAL_ASSESSMENT: 0-10

## 2018-06-04 ENCOUNTER — ROUTINE PRENATAL (OUTPATIENT)
Dept: OBGYN CLINIC | Age: 28
End: 2018-06-04

## 2018-06-04 ENCOUNTER — PROCEDURE VISIT (OUTPATIENT)
Dept: OBGYN CLINIC | Age: 28
End: 2018-06-04
Payer: COMMERCIAL

## 2018-06-04 VITALS
BODY MASS INDEX: 28.34 KG/M2 | HEART RATE: 89 BPM | SYSTOLIC BLOOD PRESSURE: 120 MMHG | WEIGHT: 160 LBS | DIASTOLIC BLOOD PRESSURE: 73 MMHG

## 2018-06-04 DIAGNOSIS — O09.212 PREVIOUS PRETERM DELIVERY IN SECOND TRIMESTER, ANTEPARTUM: ICD-10-CM

## 2018-06-04 DIAGNOSIS — O09.213 PREVIOUS PRETERM DELIVERY IN THIRD TRIMESTER, ANTEPARTUM: ICD-10-CM

## 2018-06-04 DIAGNOSIS — N88.3 SHORT CERVIX: ICD-10-CM

## 2018-06-04 DIAGNOSIS — O09.92 HIGH-RISK PREGNANCY IN SECOND TRIMESTER: Primary | ICD-10-CM

## 2018-06-04 DIAGNOSIS — Z3A.22 22 WEEKS GESTATION OF PREGNANCY: ICD-10-CM

## 2018-06-04 PROCEDURE — 0502F SUBSEQUENT PRENATAL CARE: CPT | Performed by: SPECIALIST

## 2018-06-04 PROCEDURE — 76817 TRANSVAGINAL US OBSTETRIC: CPT | Performed by: SPECIALIST

## 2018-06-12 ENCOUNTER — PROCEDURE VISIT (OUTPATIENT)
Dept: OBGYN CLINIC | Age: 28
End: 2018-06-12
Payer: COMMERCIAL

## 2018-06-12 ENCOUNTER — ROUTINE PRENATAL (OUTPATIENT)
Dept: OBGYN CLINIC | Age: 28
End: 2018-06-12

## 2018-06-12 VITALS
SYSTOLIC BLOOD PRESSURE: 104 MMHG | BODY MASS INDEX: 28.7 KG/M2 | WEIGHT: 162 LBS | HEART RATE: 88 BPM | DIASTOLIC BLOOD PRESSURE: 65 MMHG

## 2018-06-12 DIAGNOSIS — O09.212 PREVIOUS PRETERM DELIVERY, ANTEPARTUM, SECOND TRIMESTER: ICD-10-CM

## 2018-06-12 DIAGNOSIS — Z3A.24 24 WEEKS GESTATION OF PREGNANCY: ICD-10-CM

## 2018-06-12 DIAGNOSIS — O09.92 HIGH-RISK PREGNANCY IN SECOND TRIMESTER: Primary | ICD-10-CM

## 2018-06-12 DIAGNOSIS — O09.213 PREVIOUS PRETERM DELIVERY IN THIRD TRIMESTER, ANTEPARTUM: Primary | ICD-10-CM

## 2018-06-12 PROCEDURE — 76816 OB US FOLLOW-UP PER FETUS: CPT | Performed by: SPECIALIST

## 2018-06-12 PROCEDURE — 76817 TRANSVAGINAL US OBSTETRIC: CPT | Performed by: SPECIALIST

## 2018-06-12 PROCEDURE — 0502F SUBSEQUENT PRENATAL CARE: CPT | Performed by: SPECIALIST

## 2018-06-18 ENCOUNTER — PROCEDURE VISIT (OUTPATIENT)
Dept: OBGYN CLINIC | Age: 28
End: 2018-06-18
Payer: COMMERCIAL

## 2018-06-18 ENCOUNTER — ROUTINE PRENATAL (OUTPATIENT)
Dept: OBGYN CLINIC | Age: 28
End: 2018-06-18

## 2018-06-18 VITALS
BODY MASS INDEX: 29.23 KG/M2 | HEART RATE: 100 BPM | WEIGHT: 165 LBS | SYSTOLIC BLOOD PRESSURE: 123 MMHG | DIASTOLIC BLOOD PRESSURE: 79 MMHG

## 2018-06-18 DIAGNOSIS — Z3A.24 24 WEEKS GESTATION OF PREGNANCY: Primary | ICD-10-CM

## 2018-06-18 DIAGNOSIS — O09.212 PREVIOUS PRETERM DELIVERY IN SECOND TRIMESTER, ANTEPARTUM: ICD-10-CM

## 2018-06-18 DIAGNOSIS — Z3A.20 20 WEEKS GESTATION OF PREGNANCY: ICD-10-CM

## 2018-06-18 DIAGNOSIS — O09.92 HIGH-RISK PREGNANCY IN SECOND TRIMESTER: ICD-10-CM

## 2018-06-18 DIAGNOSIS — O09.92 PREGNANCY, SUPERVISION, HIGH-RISK, SECOND TRIMESTER: ICD-10-CM

## 2018-06-18 PROCEDURE — 76817 TRANSVAGINAL US OBSTETRIC: CPT | Performed by: SPECIALIST

## 2018-06-18 PROCEDURE — 0502F SUBSEQUENT PRENATAL CARE: CPT | Performed by: SPECIALIST

## 2018-06-26 ENCOUNTER — PROCEDURE VISIT (OUTPATIENT)
Dept: OBGYN CLINIC | Age: 28
End: 2018-06-26
Payer: COMMERCIAL

## 2018-06-26 ENCOUNTER — ROUTINE PRENATAL (OUTPATIENT)
Dept: OBGYN CLINIC | Age: 28
End: 2018-06-26

## 2018-06-26 VITALS
SYSTOLIC BLOOD PRESSURE: 117 MMHG | DIASTOLIC BLOOD PRESSURE: 71 MMHG | HEART RATE: 90 BPM | WEIGHT: 167 LBS | BODY MASS INDEX: 29.58 KG/M2

## 2018-06-26 DIAGNOSIS — Z3A.26 26 WEEKS GESTATION OF PREGNANCY: ICD-10-CM

## 2018-06-26 DIAGNOSIS — O09.213 PREVIOUS PRETERM DELIVERY IN THIRD TRIMESTER, ANTEPARTUM: ICD-10-CM

## 2018-06-26 DIAGNOSIS — N88.3 SHORT CERVIX: Primary | ICD-10-CM

## 2018-06-26 DIAGNOSIS — O09.92 HIGH-RISK PREGNANCY IN SECOND TRIMESTER: ICD-10-CM

## 2018-06-26 PROCEDURE — 76817 TRANSVAGINAL US OBSTETRIC: CPT | Performed by: SPECIALIST

## 2018-06-26 PROCEDURE — 0502F SUBSEQUENT PRENATAL CARE: CPT | Performed by: SPECIALIST

## 2018-07-01 PROBLEM — Z98.890 POST-OPERATIVE STATE: Status: RESOLVED | Noted: 2018-06-01 | Resolved: 2018-07-01

## 2018-07-02 ENCOUNTER — ROUTINE PRENATAL (OUTPATIENT)
Dept: OBGYN CLINIC | Age: 28
End: 2018-07-02

## 2018-07-02 ENCOUNTER — HOSPITAL ENCOUNTER (OUTPATIENT)
Age: 28
Setting detail: SPECIMEN
Discharge: HOME | End: 2018-07-02
Payer: COMMERCIAL

## 2018-07-02 ENCOUNTER — PROCEDURE VISIT (OUTPATIENT)
Dept: OBGYN CLINIC | Age: 28
End: 2018-07-02
Payer: COMMERCIAL

## 2018-07-02 VITALS
DIASTOLIC BLOOD PRESSURE: 75 MMHG | SYSTOLIC BLOOD PRESSURE: 126 MMHG | HEART RATE: 94 BPM | BODY MASS INDEX: 30.33 KG/M2 | WEIGHT: 171.2 LBS

## 2018-07-02 DIAGNOSIS — Z3A.26 26 WEEKS GESTATION OF PREGNANCY: ICD-10-CM

## 2018-07-02 DIAGNOSIS — N88.3 SHORT CERVIX: ICD-10-CM

## 2018-07-02 DIAGNOSIS — O09.92 HIGH-RISK PREGNANCY IN SECOND TRIMESTER: ICD-10-CM

## 2018-07-02 DIAGNOSIS — Z34.82 PRENATAL CARE, SUBSEQUENT PREGNANCY, SECOND TRIMESTER: Primary | ICD-10-CM

## 2018-07-02 DIAGNOSIS — O09.213 PREVIOUS PRETERM DELIVERY IN THIRD TRIMESTER, ANTEPARTUM: ICD-10-CM

## 2018-07-02 DIAGNOSIS — O09.212 PREVIOUS PRETERM DELIVERY, ANTEPARTUM, SECOND TRIMESTER: ICD-10-CM

## 2018-07-02 LAB
ABDOMINAL CIRCUMFERENCE: NORMAL CM
ABSOLUTE EOS #: 0.4 K/UL (ref 0–0.44)
ABSOLUTE IMMATURE GRANULOCYTE: 0.2 K/UL (ref 0–0.3)
ABSOLUTE LYMPH #: 2.64 K/UL (ref 1.1–3.7)
ABSOLUTE MONO #: 0.63 K/UL (ref 0.1–1.2)
BASOPHILS # BLD: 0 % (ref 0–2)
BASOPHILS ABSOLUTE: 0.06 K/UL (ref 0–0.2)
BIPARIETAL DIAMETER: NORMAL CM
DIFFERENTIAL TYPE: ABNORMAL
EOSINOPHILS RELATIVE PERCENT: 3 % (ref 1–4)
ESTIMATED FETAL WEIGHT: NORMAL GRAMS
FEMORAL DIAMETER: NORMAL CM
GLUCOSE ADMINISTRATION: NORMAL
GLUCOSE TOLERANCE SCREEN 50G: 122 MG/DL (ref 70–135)
HC/AC: NORMAL
HCT VFR BLD CALC: 32.4 % (ref 36.3–47.1)
HEAD CIRCUMFERENCE: NORMAL CM
HEMOGLOBIN: 10.5 G/DL (ref 11.9–15.1)
IMMATURE GRANULOCYTES: 1 %
LYMPHOCYTES # BLD: 17 % (ref 24–43)
MCH RBC QN AUTO: 32.8 PG (ref 25.2–33.5)
MCHC RBC AUTO-ENTMCNC: 32.4 G/DL (ref 28.4–34.8)
MCV RBC AUTO: 101.3 FL (ref 82.6–102.9)
MONOCYTES # BLD: 4 % (ref 3–12)
NRBC AUTOMATED: 0 PER 100 WBC
PDW BLD-RTO: 13.3 % (ref 11.8–14.4)
PLATELET # BLD: 264 K/UL (ref 138–453)
PLATELET ESTIMATE: ABNORMAL
PMV BLD AUTO: 11.8 FL (ref 8.1–13.5)
RBC # BLD: 3.2 M/UL (ref 3.95–5.11)
RBC # BLD: ABNORMAL 10*6/UL
SEG NEUTROPHILS: 75 % (ref 36–65)
SEGMENTED NEUTROPHILS ABSOLUTE COUNT: 11.51 K/UL (ref 1.5–8.1)
WBC # BLD: 15.4 K/UL (ref 3.5–11.3)
WBC # BLD: ABNORMAL 10*3/UL

## 2018-07-02 PROCEDURE — 76817 TRANSVAGINAL US OBSTETRIC: CPT | Performed by: SPECIALIST

## 2018-07-02 PROCEDURE — 0502F SUBSEQUENT PRENATAL CARE: CPT | Performed by: SPECIALIST

## 2018-07-02 NOTE — PROGRESS NOTES
26w6d Doing well. No complaints. Continue prenatal vitamins and rest as necessary.    Denies any cramping or leaking

## 2018-07-11 ENCOUNTER — ROUTINE PRENATAL (OUTPATIENT)
Dept: OBGYN CLINIC | Age: 28
End: 2018-07-11

## 2018-07-11 ENCOUNTER — PROCEDURE VISIT (OUTPATIENT)
Dept: OBGYN CLINIC | Age: 28
End: 2018-07-11
Payer: COMMERCIAL

## 2018-07-11 VITALS
BODY MASS INDEX: 30.11 KG/M2 | WEIGHT: 170 LBS | HEART RATE: 92 BPM | SYSTOLIC BLOOD PRESSURE: 127 MMHG | DIASTOLIC BLOOD PRESSURE: 78 MMHG

## 2018-07-11 DIAGNOSIS — O09.93 HIGH-RISK PREGNANCY IN THIRD TRIMESTER: ICD-10-CM

## 2018-07-11 DIAGNOSIS — O09.212 PREVIOUS PRETERM DELIVERY IN SECOND TRIMESTER, ANTEPARTUM: Primary | ICD-10-CM

## 2018-07-11 DIAGNOSIS — Z3A.28 28 WEEKS GESTATION OF PREGNANCY: ICD-10-CM

## 2018-07-11 DIAGNOSIS — O09.213 PREVIOUS PRETERM DELIVERY IN THIRD TRIMESTER, ANTEPARTUM: Primary | ICD-10-CM

## 2018-07-11 PROCEDURE — 0502F SUBSEQUENT PRENATAL CARE: CPT | Performed by: SPECIALIST

## 2018-07-11 PROCEDURE — 76817 TRANSVAGINAL US OBSTETRIC: CPT | Performed by: SPECIALIST

## 2018-07-11 NOTE — PROGRESS NOTES
28w1d Doing well. No complaints. Continue prenatal vitamins and rest as necessary. Fetal kick counts encouraged.  labor precautions reviewed  I agree with evaluation and management by RN.

## 2018-07-18 ENCOUNTER — ROUTINE PRENATAL (OUTPATIENT)
Dept: OBGYN CLINIC | Age: 28
End: 2018-07-18
Payer: COMMERCIAL

## 2018-07-18 ENCOUNTER — PROCEDURE VISIT (OUTPATIENT)
Dept: OBGYN CLINIC | Age: 28
End: 2018-07-18
Payer: COMMERCIAL

## 2018-07-18 VITALS
BODY MASS INDEX: 30.29 KG/M2 | HEART RATE: 83 BPM | WEIGHT: 171 LBS | DIASTOLIC BLOOD PRESSURE: 68 MMHG | SYSTOLIC BLOOD PRESSURE: 105 MMHG

## 2018-07-18 DIAGNOSIS — Z3A.29 29 WEEKS GESTATION OF PREGNANCY: ICD-10-CM

## 2018-07-18 DIAGNOSIS — O09.213 PREVIOUS PRETERM DELIVERY, ANTEPARTUM, THIRD TRIMESTER: ICD-10-CM

## 2018-07-18 DIAGNOSIS — O09.93 HIGH-RISK PREGNANCY IN THIRD TRIMESTER: Primary | ICD-10-CM

## 2018-07-18 DIAGNOSIS — Z23 NEED FOR PROPHYLACTIC VACCINATION WITH DIPHTHERIA-TETANUS-PERTUSSIS WITH TYPHOID-PARATYPHOID (DTP + TAB) VACCINE: ICD-10-CM

## 2018-07-18 DIAGNOSIS — N88.3 SHORT CERVIX: Primary | ICD-10-CM

## 2018-07-18 DIAGNOSIS — O09.213 PREVIOUS PRETERM DELIVERY IN THIRD TRIMESTER, ANTEPARTUM: ICD-10-CM

## 2018-07-18 PROCEDURE — 0502F SUBSEQUENT PRENATAL CARE: CPT | Performed by: SPECIALIST

## 2018-07-18 PROCEDURE — 90471 IMMUNIZATION ADMIN: CPT | Performed by: SPECIALIST

## 2018-07-18 PROCEDURE — 90715 TDAP VACCINE 7 YRS/> IM: CPT | Performed by: SPECIALIST

## 2018-07-18 PROCEDURE — 76817 TRANSVAGINAL US OBSTETRIC: CPT | Performed by: SPECIALIST

## 2018-07-18 NOTE — PROGRESS NOTES
Tiffanie Houes is a 29 y.o. female 29w1d        OB History    Para Term  AB Living   3 1   1 1 1   SAB TAB Ectopic Molar Multiple Live Births   1         1      # Outcome Date GA Lbr Chema/2nd Weight Sex Delivery Anes PTL Lv   3 Current            2  10/08/11 28w0d  3 lb 9 oz (1.616 kg) M Vag-Spont EPI Y CASTILLO      Complications: Placenta previa,Placental abruption in third trimester   1 SAB  13w0d             Birth Comments: had d&c          Blood pressure 105/68, pulse 83, weight 171 lb (77.6 kg), last menstrual period 2017, not currently breastfeeding. The patient was seen and evaluated. There was positive fetal movements. No contractions or leakage of fluid. Signs and symptoms of  labor as well as labor were reviewed. The S/S of Pre-Eclampsia were reviewed with the patient in detail. She is to report any of these if they occur. She currently denies any of these. The patient had her 28 week labs completed. The patient was instructed on fetal kick counts and was given a kick sheet to complete every 8 hours. She was instructed that the baby should move at a minimum of ten times within one hour after a meal. The patient was instructed to lay down on her left side twenty minutes after eating and count movements for up to one hour with a target value of ten movements. She was instructed to notify the office if she did not make that target after two attempts or if after any attempt there was less than four movements. The patient reports that the targets have been made Yes.     Patient Active Problem List    Diagnosis Date Noted    High-risk pregnancy in third trimester 2018    Previous  delivery, antepartum, third trimester 2018    Rh+/RI/GBS unk 2018    Short cervix 2018     17 mm CL found 18 in office      H/O PTD (G2 @ 28w) 05/15/2018    Prenatal care, subsequent pregnancy, second trimester 2018        Diagnosis

## 2018-07-18 NOTE — PROGRESS NOTES
29w1d Doing well. No complaints. Continue prenatal vitamins and rest as necessary. Fetal kick counts encouraged.

## 2018-07-24 ENCOUNTER — ROUTINE PRENATAL (OUTPATIENT)
Dept: OBGYN CLINIC | Age: 28
End: 2018-07-24

## 2018-07-24 VITALS
SYSTOLIC BLOOD PRESSURE: 120 MMHG | DIASTOLIC BLOOD PRESSURE: 73 MMHG | WEIGHT: 173 LBS | HEART RATE: 90 BPM | BODY MASS INDEX: 30.65 KG/M2

## 2018-07-24 DIAGNOSIS — Z3A.30 30 WEEKS GESTATION OF PREGNANCY: ICD-10-CM

## 2018-07-24 DIAGNOSIS — O09.93 HIGH-RISK PREGNANCY IN THIRD TRIMESTER: Primary | ICD-10-CM

## 2018-07-24 DIAGNOSIS — O09.213 PREVIOUS PRETERM DELIVERY, ANTEPARTUM, THIRD TRIMESTER: ICD-10-CM

## 2018-07-24 DIAGNOSIS — R05.9 COUGH: ICD-10-CM

## 2018-07-24 PROCEDURE — 0502F SUBSEQUENT PRENATAL CARE: CPT | Performed by: SPECIALIST

## 2018-07-24 RX ORDER — GUAIFENESIN 100 MG/5ML
10 SYRUP ORAL EVERY 4 HOURS PRN
Qty: 1 BOTTLE | Refills: 0 | Status: SHIPPED | OUTPATIENT
Start: 2018-07-24 | End: 2018-07-29

## 2018-07-24 RX ORDER — FLUCONAZOLE 100 MG/1
100 TABLET ORAL DAILY
Qty: 7 TABLET | Refills: 0 | Status: SHIPPED | OUTPATIENT
Start: 2018-07-24 | End: 2019-11-25 | Stop reason: SDUPTHER

## 2018-07-26 ENCOUNTER — PROCEDURE VISIT (OUTPATIENT)
Dept: OBGYN CLINIC | Age: 28
End: 2018-07-26
Payer: COMMERCIAL

## 2018-07-26 DIAGNOSIS — O09.213 PREVIOUS PRETERM DELIVERY IN THIRD TRIMESTER, ANTEPARTUM: ICD-10-CM

## 2018-07-26 DIAGNOSIS — N88.3 SHORT CERVIX: Primary | ICD-10-CM

## 2018-07-26 DIAGNOSIS — Z3A.30 30 WEEKS GESTATION OF PREGNANCY: ICD-10-CM

## 2018-07-26 DIAGNOSIS — O09.93 HIGH-RISK PREGNANCY IN THIRD TRIMESTER: ICD-10-CM

## 2018-07-26 PROCEDURE — 76817 TRANSVAGINAL US OBSTETRIC: CPT | Performed by: SPECIALIST

## 2018-07-30 ENCOUNTER — ROUTINE PRENATAL (OUTPATIENT)
Dept: OBGYN CLINIC | Age: 28
End: 2018-07-30

## 2018-07-30 ENCOUNTER — PROCEDURE VISIT (OUTPATIENT)
Dept: OBGYN CLINIC | Age: 28
End: 2018-07-30
Payer: COMMERCIAL

## 2018-07-30 VITALS
BODY MASS INDEX: 31.18 KG/M2 | DIASTOLIC BLOOD PRESSURE: 72 MMHG | SYSTOLIC BLOOD PRESSURE: 121 MMHG | WEIGHT: 176 LBS | HEART RATE: 91 BPM

## 2018-07-30 DIAGNOSIS — Z3A.30 30 WEEKS GESTATION OF PREGNANCY: ICD-10-CM

## 2018-07-30 DIAGNOSIS — N88.3 SHORT CERVIX: ICD-10-CM

## 2018-07-30 DIAGNOSIS — O09.213 PREVIOUS PRETERM DELIVERY, ANTEPARTUM, THIRD TRIMESTER: ICD-10-CM

## 2018-07-30 DIAGNOSIS — O09.213 PREVIOUS PRETERM DELIVERY IN THIRD TRIMESTER, ANTEPARTUM: Primary | ICD-10-CM

## 2018-07-30 DIAGNOSIS — O09.93 HIGH-RISK PREGNANCY IN THIRD TRIMESTER: Primary | ICD-10-CM

## 2018-07-30 PROCEDURE — 76817 TRANSVAGINAL US OBSTETRIC: CPT | Performed by: SPECIALIST

## 2018-07-30 PROCEDURE — 0502F SUBSEQUENT PRENATAL CARE: CPT | Performed by: SPECIALIST

## 2018-07-30 PROCEDURE — 76816 OB US FOLLOW-UP PER FETUS: CPT | Performed by: SPECIALIST

## 2018-07-30 NOTE — PROGRESS NOTES
Kaykay Frost is a 29 y.o. female 27w9d    [id number]    OB History    Para Term  AB Living   3 1   1 1 1   SAB TAB Ectopic Molar Multiple Live Births   1         1      # Outcome Date GA Lbr Chema/2nd Weight Sex Delivery Anes PTL Lv   3 Current            2  10/08/11 28w0d  3 lb 9 oz (1.616 kg) M Vag-Spont EPI Y CASTILLO      Complications: Placenta previa,Placental abruption in third trimester   1 SAB  13w0d             Birth Comments: had d&c          Blood pressure 121/72, pulse 91, weight 176 lb (79.8 kg), last menstrual period 2017, not currently breastfeeding. The patient was seen and evaluated. There was positive fetal movements. No contractions or leakage of fluid. Signs and symptoms of  labor as well as labor were reviewed. The S/S of Pre-Eclampsia were reviewed with the patient in detail. She is to report any of these if they occur. She currently denies any of these. The patient had her 28 week labs completed. The patient was instructed on fetal kick counts and was given a kick sheet to complete every 8 hours. She was instructed that the baby should move at a minimum of ten times within one hour after a meal. The patient was instructed to lay down on her left side twenty minutes after eating and count movements for up to one hour with a target value of ten movements. She was instructed to notify the office if she did not make that target after two attempts or if after any attempt there was less than four movements. The patient reports that the targets have been made Yes.     Patient Active Problem List    Diagnosis Date Noted    High-risk pregnancy in third trimester 2018    Previous  delivery, antepartum, third trimester 2018    Rh+/RI/GBS unk 2018    Short cervix 2018     17 mm CL found 18 in office      H/O PTD (G2 @ 28w) 05/15/2018    Prenatal care, subsequent pregnancy, second trimester 2018        Diagnosis

## 2018-07-30 NOTE — PROGRESS NOTES
30w6d Doing well. No complaints. Continue prenatal vitamins and rest as necessary. Fetal kick counts encouraged.   [id number] [address] [place]

## 2018-08-01 ENCOUNTER — TELEPHONE (OUTPATIENT)
Dept: OBGYN CLINIC | Age: 28
End: 2018-08-01

## 2018-08-08 ENCOUNTER — ROUTINE PRENATAL (OUTPATIENT)
Dept: OBGYN CLINIC | Age: 28
End: 2018-08-08

## 2018-08-08 ENCOUNTER — PROCEDURE VISIT (OUTPATIENT)
Dept: OBGYN CLINIC | Age: 28
End: 2018-08-08
Payer: COMMERCIAL

## 2018-08-08 VITALS
HEART RATE: 96 BPM | BODY MASS INDEX: 31.5 KG/M2 | WEIGHT: 177.8 LBS | SYSTOLIC BLOOD PRESSURE: 124 MMHG | DIASTOLIC BLOOD PRESSURE: 72 MMHG

## 2018-08-08 DIAGNOSIS — O09.213 PREVIOUS PRETERM DELIVERY IN THIRD TRIMESTER, ANTEPARTUM: ICD-10-CM

## 2018-08-08 DIAGNOSIS — N88.3 SHORT CERVIX: Primary | ICD-10-CM

## 2018-08-08 DIAGNOSIS — O09.213 PREVIOUS PRETERM DELIVERY, ANTEPARTUM, THIRD TRIMESTER: ICD-10-CM

## 2018-08-08 DIAGNOSIS — N88.3 SHORT CERVIX: ICD-10-CM

## 2018-08-08 DIAGNOSIS — Z3A.32 32 WEEKS GESTATION OF PREGNANCY: ICD-10-CM

## 2018-08-08 DIAGNOSIS — O09.93 HRP (HIGH RISK PREGNANCY), THIRD TRIMESTER: Primary | ICD-10-CM

## 2018-08-08 PROCEDURE — 76817 TRANSVAGINAL US OBSTETRIC: CPT | Performed by: SPECIALIST

## 2018-08-08 PROCEDURE — 0502F SUBSEQUENT PRENATAL CARE: CPT | Performed by: SPECIALIST

## 2018-08-08 NOTE — PROGRESS NOTES
Cervical length today shows shortening. Patient denies cramping or bleeding. Explained to patient that cerclage will be removed between 40 and 38 gestational weeks. Cervical length will be repeated in 1 week. Patient advised to continue taking prenatal vitamins and to rest as necessary. The patient will return to the office for her next visit in 3 days. See antepartum flow sheet. Lorena Faust am scribing for, and in the presence of Dr. Marta Sesay. Electronically signed by: Kathy Barnes 8/8/18 1:41 PM   I agree to the above documentation placed by my scribe Kathy Barnes. I reviewed the scribe's note and agree with the documented findings and plan of care. Any areas of disagreement are noted on the chart. I have personally evaluated this patient. Additional findings are as noted. I agree with the chief complaint, past medical history, past surgical history, allergies, medications, social and family history as documented unless otherwise noted below.      Electronically signed by Marta Sesay MD on 8/11/2018 at 4:42 PM

## 2018-08-09 ENCOUNTER — HOSPITAL ENCOUNTER (OUTPATIENT)
Age: 28
Discharge: HOME | End: 2018-08-09
Attending: SPECIALIST | Admitting: SPECIALIST
Payer: COMMERCIAL

## 2018-08-09 VITALS
TEMPERATURE: 98.2 F | SYSTOLIC BLOOD PRESSURE: 122 MMHG | RESPIRATION RATE: 16 BRPM | DIASTOLIC BLOOD PRESSURE: 72 MMHG | HEART RATE: 107 BPM

## 2018-08-09 PROBLEM — O09.93 HRP (HIGH RISK PREGNANCY), THIRD TRIMESTER: Status: ACTIVE | Noted: 2018-08-09

## 2018-08-09 PROCEDURE — 59025 FETAL NON-STRESS TEST: CPT

## 2018-08-09 PROCEDURE — 76818 FETAL BIOPHYS PROFILE W/NST: CPT

## 2018-08-09 NOTE — PROGRESS NOTES
TESTING NOTE    nAais Davis is a 29 y.o. female  at [place] [address]    The patient was seen and examined. She is here today for  testing for because she is at high risk for the following reasons: cerclage in place. Short cervix with Hx of PTD @ 28 weeks (G2). The baby is moving well and she denies any complaints. Patient Active Problem List   Diagnosis    Prenatal care, subsequent pregnancy, second trimester    H/O PTD (G2 @ 28w)    Rh+/RI/GBS unk    Short cervix    High-risk pregnancy in third trimester    Previous  delivery, antepartum, third trimester    HRP (high risk pregnancy), third trimester       Vitals:  Vitals:    18 1411   BP: 122/72   Pulse: 107   Resp: 16   Temp: 98.2 °F (36.8 °C)   TempSrc: Oral         NST:   Fetal heart rate baseline: 145, moderate variability, accelerations present, decelerations absent    The tracing has been reviewed and is considered reactive. Biophysical Profile:   Maximum vertical pocket: 3.5 cm  Tone: Present  Movement: Present  Breathing: Present    Biophysical Score: 8 / 8    Fetal Position: Cephalic    Assessment/Plan:  1. Anais Davis is a 29 y.o. female  at 32w2d  2. NST/ BPP 10/10   - The patient will continue with her scheduled office appointments. - She will continue with her  testing as scheduled. - Signs and Symptoms of  labor precautions were reviewed. - She was counseled on adequate hydration prior to discharge   - The patient was instructed on fetal kick counts. 3. Discussed results with Dr. Ryan Vivar who is agreeable to the above plan.      Oanh Mccabe DO  Ob/Gyn Resident   2018, 2:48 PM

## 2018-08-13 ENCOUNTER — ROUTINE PRENATAL (OUTPATIENT)
Dept: OBGYN CLINIC | Age: 28
End: 2018-08-13

## 2018-08-13 ENCOUNTER — HOSPITAL ENCOUNTER (OUTPATIENT)
Age: 28
Discharge: HOME | End: 2018-08-13
Attending: SPECIALIST | Admitting: SPECIALIST
Payer: COMMERCIAL

## 2018-08-13 VITALS
SYSTOLIC BLOOD PRESSURE: 122 MMHG | BODY MASS INDEX: 31.53 KG/M2 | HEART RATE: 94 BPM | DIASTOLIC BLOOD PRESSURE: 68 MMHG | WEIGHT: 178 LBS

## 2018-08-13 VITALS
RESPIRATION RATE: 16 BRPM | DIASTOLIC BLOOD PRESSURE: 81 MMHG | SYSTOLIC BLOOD PRESSURE: 125 MMHG | HEART RATE: 92 BPM | TEMPERATURE: 98.9 F

## 2018-08-13 DIAGNOSIS — Z3A.30 30 WEEKS GESTATION OF PREGNANCY: ICD-10-CM

## 2018-08-13 DIAGNOSIS — O09.93 HIGH-RISK PREGNANCY IN THIRD TRIMESTER: ICD-10-CM

## 2018-08-13 DIAGNOSIS — N88.3 SHORT CERVIX: ICD-10-CM

## 2018-08-13 DIAGNOSIS — Z3A.32 32 WEEKS GESTATION OF PREGNANCY: ICD-10-CM

## 2018-08-13 DIAGNOSIS — O09.213 PREVIOUS PRETERM DELIVERY, ANTEPARTUM, THIRD TRIMESTER: ICD-10-CM

## 2018-08-13 DIAGNOSIS — O09.93 HRP (HIGH RISK PREGNANCY), THIRD TRIMESTER: Primary | ICD-10-CM

## 2018-08-13 PROBLEM — Z91.199 NONCOMPLIANCE: Status: ACTIVE | Noted: 2018-08-13

## 2018-08-13 PROBLEM — O34.30 CERVICAL CERCLAGE SUTURE PRESENT: Status: ACTIVE | Noted: 2018-08-13

## 2018-08-13 PROBLEM — A74.9 CHLAMYDIA INFECTION AFFECTING PREGNANCY: Status: ACTIVE | Noted: 2018-08-13

## 2018-08-13 PROBLEM — Z78.9 POOR HISTORIAN: Status: ACTIVE | Noted: 2018-08-13

## 2018-08-13 PROBLEM — O98.819 CHLAMYDIA INFECTION AFFECTING PREGNANCY: Status: ACTIVE | Noted: 2018-08-13

## 2018-08-13 LAB
ACCELERATIONS > 10BPM: NORMAL
ACCELERATIONS > 15 BPM: NORMAL
ACOUSTIC STIMULATION: NORMAL
DECELERATIONS: NORMAL
FHR VARIABILITIES: NORMAL
NST ASSESSMENT: NORMAL
NST BASELINE: NORMAL
NST DURATION: NORMAL MINUTES
NST INDICATIONS: NORMAL
NST LOCATIONS: NORMAL
NST READ BY: NORMAL
NST RETURN: NORMAL
UTERINE ACTIVITY: NORMAL

## 2018-08-13 PROCEDURE — 76818 FETAL BIOPHYS PROFILE W/NST: CPT | Performed by: OBSTETRICS & GYNECOLOGY

## 2018-08-13 PROCEDURE — 99213 OFFICE O/P EST LOW 20 MIN: CPT

## 2018-08-13 PROCEDURE — G0378 HOSPITAL OBSERVATION PER HR: HCPCS

## 2018-08-13 PROCEDURE — 0502F SUBSEQUENT PRENATAL CARE: CPT | Performed by: SPECIALIST

## 2018-08-13 NOTE — H&P
smokeless tobacco. She reports that she does not drink alcohol or use drugs. VITALS:  Vitals:    18 1126   BP: 125/81   Pulse: 92   Resp: 16   Temp: 98.9 °F (37.2 °C)       PHYSICAL EXAM:  Fetal Heart Monitor:  Baseline Heart Rate 140, moderate variability, present accelerations, absent decelerations  [place]: contractions, rare    General appearance:  no apparent distress, alert and cooperative  Neurologic:  alert, oriented, normal speech, no focal findings or movement disorder noted  Lungs:  No increased work of breathing, good air exchange, clear to auscultation bilaterally, no crackles or wheezing  Heart:  normal S1 and S2, regular rate and rhythm and no murmur    Abdomen:  soft, gravid, non-tender, no right upper quadrant tenderness, no CVA tenderness, uterus non-tender, no signs of abruption and no signs of chorioamnionitis  Extremities:  no calf tenderness, non edematous  Pelvic Exam: not indicated    PRENATAL LAB RESULTS:   Blood Type/Rh: A pos  Antibody Screen: negative  Hemoglobin, Hematocrit, Platelets: 61.5 / 38.5 / 262  Rubella: immune  T.  Pallidum, IgG: non-reactive   Hepatitis B Surface Antigen: non-reactive   HIV: non-reactive   Sickle Cell Screen: negative  Gonorrhea: negative  Chlamydia: positive on 3/29/18   Urine culture: negative, date: 3/22/18    1 hour Glucose Tolerance Test: 122  3 hour Glucose Tolerance Test: not indicated    Group B Strep: not done  Cystic Fibrosis Screen: negative  First Trimester Screen: not done  MSAFP/Multiple Markers: not done  Non-Invasive Prenatal Testing: not done  Anatomy US: normal anatomy, female, posterior placenta, 3VC, vertex, CL 1.86cm      ASSESSMENT & PLAN:  Howard Cuba is a 29 y.o. female  at [address] [place] [address] who presented via Ambulance from the office for prolonged deceleration.   - GBS unknown / Rh positive / R immune   - Pen G for GBS prophylaxis   - Per Dr. Preethi Mendenhall, prolonged deceleration in the office to the 100's for 10-15min that returned back to baseline   - No obstetrical complaints at this time   - Cat I FHT with rare contractions   - Extended fetal monitoring for 2 hours as per Dr. Annalee Odell   - BPP ordered-  please refer to  testing BPP note   - If any decelerations noted during her extended monitoring- we will consider MFM consult for Dopplers to assess placental insufficiency    S/p Vaz Cerclage placement 18   - Knot at 12 o'clock   - Shortened CL 17mm 18   - Hx of PTD, unsure if spontaneous due to questionable historian    Hx of PTD (G2 @ 28wks)   - Unknown history     Positive chlamydia 3/29/18   - Patient states she received antibiotics   - Needs TOR, as per Dr. Annalee Odell to be done at a later time    Poor historian/ Noncompliance    Patient is ok to be discharged from [facility] standpoint. Discussed signs and symptoms of  labor, decreased fetal movements, and the need to notify provider prior to hospital arrival were reviewed. Discussed signs and symptoms of preeclampsia - precautions reviewed. Patient to follow up with Dr. Annalee Odell on 18 for next prenatal appointment. All questions and concerns answered. Patient verbalized understanding and agreement to plan. Patient Active Problem List    Diagnosis Date Noted    32 weeks gestation of pregnancy 2018    HRP (high risk pregnancy), third trimester 2018    High-risk pregnancy in third trimester 2018    Previous  delivery, antepartum, third trimester 2018    Rh+/RI/GBS unk 2018    Short cervix 2018     17 mm CL found 18 in office      H/O PTD (G2 @ 28w) 05/15/2018    Prenatal care, subsequent pregnancy, second trimester 2018       Plan discussed with Dr. Annalee Odell, who is agreeable. Steroids given this admission: No    Risks, benefits, alternatives and possible complications have been discussed in detail with the patient.  Admission, and post admission procedures and expectations were discussed in

## 2018-08-13 NOTE — PROGRESS NOTES
TESTING NOTE    Howard Wright is a 29 y.o. female  at [address] [place] [address]    The patient was seen and examined. She is here today for  testing for because she is at high risk for the following reasons: Prolonged deceleration in Dr. Sujatha Tinsley' office today. (Please see H&P)  The baby is moving well and she denies any complaints. Patient Active Problem List   Diagnosis    Prenatal care, subsequent pregnancy, second trimester    H/O PTD (G2 @ 28w)    Rh+/RI/GBS unk    Short cervix    High-risk pregnancy in third trimester    Previous  delivery, antepartum, third trimester    HRP (high risk pregnancy), third trimester    32 weeks gestation of pregnancy    + Chlamydia 3/29/18 (no TOR)    Vaz Cervical cerclage placed 18    Noncompliance    Poor historian       Vitals:  Vitals:    18 1126   BP: 125/81   Pulse: 92   Resp: 16   Temp: 98.9 °F (37.2 °C)         Biophysical Profile:   Amniotic Fluid Index: 9.53cm (greater than 2x2cm vertical pocket)  Tone: Present  Movement: Present  Breathing: Present    Biophysical Score: 8 / 8    Fetal Position: Cephalic    Assessment/Plan:  1. Howard Wright is a 29 y.o. female  at [address] [place] [address]  2. BPP    - The patient will continue with her scheduled office appointments. Patient to follow up with CL ultrasound and NST on 18 and Dr. Sujatha Tinsley' office for PN appointment 18. - She will continue with her  testing as scheduled. - Signs and Symptoms of  labor precautions were reviewed. - She was counseled on adequate hydration prior to discharge   - The patient was instructed on fetal kick counts. 3. Dr. Chepe Thornton has reviewed this BPP and agrees with the above stated assessment and plan. 4. Discussed results with Dr. Sujatha Tinsley who is agreeable to the above plan.      Bebeto Staton DO  Ob/Gyn Resident   2018, 1:36 PM     OBGYN Resident Statement  I have discussed the case, including pertinent history and exam findings with the above resident. I have personally seen the patient. I agree with the assessment, plan and orders as documented. I have made changes to the above note as needed. Will discuss the case with above named attending.      Sudheer Mederos DO  OBGYN Resident  Pager: [phone]  8/13/2018 1:42 PM

## 2018-08-13 NOTE — PROGRESS NOTES
Anais Davis is a 29 y.o. female 32w6d        OB History    Para Term  AB Living   3 1   1 1 1   SAB TAB Ectopic Molar Multiple Live Births   1         1      # Outcome Date GA Lbr Chema/2nd Weight Sex Delivery Anes PTL Lv   3 Current            2  10/08/11 28w0d  3 lb 9 oz (1.616 kg) M Vag-Spont EPI Y CASTILLO      Complications: Placenta previa,Placental abruption in third trimester   1 SAB  13w0d             Birth Comments: had d&c          Blood pressure 122/68, pulse 94, weight 178 lb (80.7 kg), last menstrual period 2017, not currently breastfeeding. The patient was seen and evaluated. There was positive fetal movements. No contractions or leakage of fluid. Signs and symptoms of  labor as well as labor were reviewed. The S/S of Pre-Eclampsia were reviewed with the patient in detail. She is to report any of these if they occur. She currently denies any of these. The patient had her 28 week labs completed. The patient was instructed on fetal kick counts and was given a kick sheet to complete every 8 hours. She was instructed that the baby should move at a minimum of ten times within one hour after a meal. The patient was instructed to lay down on her left side twenty minutes after eating and count movements for up to one hour with a target value of ten movements. She was instructed to notify the office if she did not make that target after two attempts or if after any attempt there was less than four movements. The patient reports that the targets have been made Yes.     Patient Active Problem List    Diagnosis Date Noted    HRP (high risk pregnancy), third trimester 2018    High-risk pregnancy in third trimester 2018    Previous  delivery, antepartum, third trimester 2018    Rh+/RI/GBS unk 2018    Short cervix 2018     17 mm CL found 18 in office      H/O PTD (G2 @ 28w) 05/15/2018    Prenatal care, subsequent pregnancy, second trimester 2018        Diagnosis Orders   1. HRP (high risk pregnancy), third trimester     2. 32 weeks gestation of pregnancy     3. Previous  delivery, antepartum, third trimester     4. Short cervix       Patient here for NST with decrease in fetal heart rate down to the 80's. 911 was called and patient sent to [facility] for further evaluation. FHR increased prior to patient leaving for hospital.  Explained to patient the importance of extended monitoring for fetal reassurance. The patient will return to the office for her next visit in 3 days. See antepartum flow sheet. I agree to the above documentation placed by my scribe Jonel Felipe. I reviewed the scribe's note and agree with the documented findings and plan of care. Any areas of disagreement are noted on the chart. I have personally evaluated this patient. Additional findings are as noted. I agree with the chief complaint, past medical history, past surgical history, allergies, medications, social and family history as documented unless otherwise noted below. Electronically signed by Arely Arora MD on 2018 at 2:11 AM    IJonel am scribing for, and in the presence of Dr. Arely Arora.    Electronically signed by: Jonel Felipe 18 10:30 AM

## 2018-08-16 ENCOUNTER — PROCEDURE VISIT (OUTPATIENT)
Dept: OBGYN CLINIC | Age: 28
End: 2018-08-16
Payer: COMMERCIAL

## 2018-08-16 ENCOUNTER — HOSPITAL ENCOUNTER (OUTPATIENT)
Age: 28
Discharge: HOME | End: 2018-08-16
Attending: SPECIALIST | Admitting: SPECIALIST
Payer: COMMERCIAL

## 2018-08-16 VITALS
SYSTOLIC BLOOD PRESSURE: 128 MMHG | DIASTOLIC BLOOD PRESSURE: 77 MMHG | HEART RATE: 100 BPM | TEMPERATURE: 98 F | RESPIRATION RATE: 16 BRPM

## 2018-08-16 DIAGNOSIS — N88.3 SHORT CERVIX: Primary | ICD-10-CM

## 2018-08-16 DIAGNOSIS — O09.213 PREVIOUS PRETERM DELIVERY IN THIRD TRIMESTER, ANTEPARTUM: ICD-10-CM

## 2018-08-16 DIAGNOSIS — Z3A.33 33 WEEKS GESTATION OF PREGNANCY: ICD-10-CM

## 2018-08-16 DIAGNOSIS — O34.33 CERVICAL CERCLAGE SUTURE PRESENT IN THIRD TRIMESTER: ICD-10-CM

## 2018-08-16 DIAGNOSIS — O09.213 PREVIOUS PRETERM DELIVERY, ANTEPARTUM, THIRD TRIMESTER: ICD-10-CM

## 2018-08-16 PROCEDURE — 59025 FETAL NON-STRESS TEST: CPT

## 2018-08-16 PROCEDURE — 76817 TRANSVAGINAL US OBSTETRIC: CPT | Performed by: SPECIALIST

## 2018-08-20 ENCOUNTER — ROUTINE PRENATAL (OUTPATIENT)
Dept: OBGYN CLINIC | Age: 28
End: 2018-08-20
Payer: COMMERCIAL

## 2018-08-20 VITALS
WEIGHT: 181 LBS | DIASTOLIC BLOOD PRESSURE: 80 MMHG | BODY MASS INDEX: 32.06 KG/M2 | SYSTOLIC BLOOD PRESSURE: 124 MMHG | HEART RATE: 98 BPM

## 2018-08-20 DIAGNOSIS — O09.213 PREVIOUS PRETERM DELIVERY IN THIRD TRIMESTER, ANTEPARTUM: ICD-10-CM

## 2018-08-20 DIAGNOSIS — O09.213 PREVIOUS PRETERM DELIVERY, ANTEPARTUM, THIRD TRIMESTER: ICD-10-CM

## 2018-08-20 DIAGNOSIS — N88.3 SHORT CERVIX: ICD-10-CM

## 2018-08-20 PROCEDURE — 59025 FETAL NON-STRESS TEST: CPT | Performed by: SPECIALIST

## 2018-08-20 PROCEDURE — 0502F SUBSEQUENT PRENATAL CARE: CPT | Performed by: SPECIALIST

## 2018-08-20 NOTE — PROGRESS NOTES
Adriana Costa is a 29 y.o. female 32w10d    [id number]    OB History    Para Term  AB Living   3 1   1 1 1   SAB TAB Ectopic Molar Multiple Live Births   1         1      # Outcome Date GA Lbr Chema/2nd Weight Sex Delivery Anes PTL Lv   3 Current            2  10/08/11 28w0d  3 lb 9 oz (1.616 kg) M Vag-Spont EPI Y CASTILLO      Complications: Placenta previa,Placental abruption in third trimester   1 SAB  13w0d             Birth Comments: had d&c          Blood pressure 124/80, pulse 98, weight 181 lb (82.1 kg), last menstrual period 2017, not currently breastfeeding. The patient was seen and evaluated. There was positive fetal movements. No contractions or leakage of fluid. Signs and symptoms of  labor as well as labor were reviewed. The S/S of Pre-Eclampsia were reviewed with the patient in detail. She is to report any of these if they occur. She currently denies any of these. The patient had her 28 week labs completed. The patient was instructed on fetal kick counts and was given a kick sheet to complete every 8 hours. She was instructed that the baby should move at a minimum of ten times within one hour after a meal. The patient was instructed to lay down on her left side twenty minutes after eating and count movements for up to one hour with a target value of ten movements. She was instructed to notify the office if she did not make that target after two attempts or if after any attempt there was less than four movements. The patient reports that the targets have been made Yes.     Patient Active Problem List    Diagnosis Date Noted    32 weeks gestation of pregnancy 2018    + Chlamydia 3/29/18 (no TOR) 2018    Vaz Cervical cerclage placed 2018     Knot at 12 o'clock      Noncompliance 2018    Poor historian 2018    HRP (high risk pregnancy), third trimester 2018    High-risk pregnancy in third trimester 2018

## 2018-08-23 ENCOUNTER — PROCEDURE VISIT (OUTPATIENT)
Dept: OBGYN CLINIC | Age: 28
End: 2018-08-23
Payer: COMMERCIAL

## 2018-08-23 DIAGNOSIS — Z3A.34 34 WEEKS GESTATION OF PREGNANCY: ICD-10-CM

## 2018-08-23 DIAGNOSIS — N88.3 SHORT CERVIX: ICD-10-CM

## 2018-08-23 DIAGNOSIS — O09.213 PREVIOUS PRETERM DELIVERY IN THIRD TRIMESTER, ANTEPARTUM: ICD-10-CM

## 2018-08-23 DIAGNOSIS — O09.213 PREVIOUS PRETERM DELIVERY, ANTEPARTUM, THIRD TRIMESTER: ICD-10-CM

## 2018-08-23 PROCEDURE — 76816 OB US FOLLOW-UP PER FETUS: CPT | Performed by: SPECIALIST

## 2018-08-23 PROCEDURE — 76818 FETAL BIOPHYS PROFILE W/NST: CPT | Performed by: SPECIALIST

## 2018-08-23 PROCEDURE — 76817 TRANSVAGINAL US OBSTETRIC: CPT | Performed by: SPECIALIST

## 2018-08-27 ENCOUNTER — ROUTINE PRENATAL (OUTPATIENT)
Dept: OBGYN CLINIC | Age: 28
End: 2018-08-27
Payer: COMMERCIAL

## 2018-08-27 VITALS
WEIGHT: 184 LBS | SYSTOLIC BLOOD PRESSURE: 136 MMHG | BODY MASS INDEX: 32.59 KG/M2 | DIASTOLIC BLOOD PRESSURE: 74 MMHG | HEART RATE: 108 BPM

## 2018-08-27 DIAGNOSIS — N88.3 SHORT CERVIX: ICD-10-CM

## 2018-08-27 DIAGNOSIS — O09.213 PREVIOUS PRETERM DELIVERY, ANTEPARTUM, THIRD TRIMESTER: ICD-10-CM

## 2018-08-27 DIAGNOSIS — Z3A.34 34 WEEKS GESTATION OF PREGNANCY: ICD-10-CM

## 2018-08-27 DIAGNOSIS — O09.93 HRP (HIGH RISK PREGNANCY), THIRD TRIMESTER: Primary | ICD-10-CM

## 2018-08-27 DIAGNOSIS — O09.213 PREVIOUS PRETERM DELIVERY IN THIRD TRIMESTER, ANTEPARTUM: ICD-10-CM

## 2018-08-27 PROCEDURE — 59025 FETAL NON-STRESS TEST: CPT | Performed by: SPECIALIST

## 2018-08-27 PROCEDURE — 0502F SUBSEQUENT PRENATAL CARE: CPT | Performed by: SPECIALIST

## 2018-08-27 NOTE — PROGRESS NOTES
34w6d Doing well. no complaints Continue prenatal vitamins and rest as necessary. Fetal kick counts encouraged.
2018    Previous  delivery, antepartum, third trimester 2018    Rh+/RI/GBS unk 2018    Short cervix 2018     17 mm CL found 18 in office. Patient currently taking Sherrye Colander H/O PTD (G2 @ 28w) 05/15/2018    Prenatal care, subsequent pregnancy, second trimester 2018        Diagnosis Orders   1. HRP (high risk pregnancy), third trimester     2. Previous  delivery, antepartum, third trimester     3. 34 weeks gestation of pregnancy       Patient doing well. Patient advised to continue taking prenatal vitamins and to rest as necessary. The patient will return to the office for her next visit in 3 days. See antepartum flow sheet. Natalee Abdi am scribing for, and in the presence of Dr. Caden Kwon. Electronically signed by: Vivian Cuba 18 11:26 AM   I agree to the above documentation placed by my scribe Vivian [place]. I reviewed the scribe's note and agree with the documented findings and plan of care. Any areas of disagreement are noted on the chart. I have personally evaluated this patient. Additional findings are as noted. I agree with the chief complaint, past medical history, past surgical history, allergies, medications, social and family history as documented unless otherwise noted below.      Electronically signed by Caden Kwon MD on 2018 at 2:43 AM

## 2018-08-29 ENCOUNTER — HOSPITAL ENCOUNTER (OUTPATIENT)
Age: 28
Discharge: HOME | End: 2018-08-29
Attending: SPECIALIST | Admitting: SPECIALIST
Payer: COMMERCIAL

## 2018-08-29 VITALS
HEART RATE: 102 BPM | DIASTOLIC BLOOD PRESSURE: 81 MMHG | TEMPERATURE: 98.6 F | SYSTOLIC BLOOD PRESSURE: 135 MMHG | RESPIRATION RATE: 20 BRPM

## 2018-08-29 PROBLEM — Z34.82 PRENATAL CARE, SUBSEQUENT PREGNANCY, SECOND TRIMESTER: Status: RESOLVED | Noted: 2018-04-18 | Resolved: 2018-08-29

## 2018-08-29 PROBLEM — O09.213 PREVIOUS PRETERM DELIVERY, ANTEPARTUM, THIRD TRIMESTER: Status: RESOLVED | Noted: 2018-07-18 | Resolved: 2018-08-29

## 2018-08-29 PROBLEM — Z3A.32 32 WEEKS GESTATION OF PREGNANCY: Status: RESOLVED | Noted: 2018-08-13 | Resolved: 2018-08-29

## 2018-08-29 PROBLEM — O09.93 HRP (HIGH RISK PREGNANCY), THIRD TRIMESTER: Status: RESOLVED | Noted: 2018-08-09 | Resolved: 2018-08-29

## 2018-08-29 PROBLEM — Z3A.35 35 WEEKS GESTATION OF PREGNANCY: Status: ACTIVE | Noted: 2018-08-29

## 2018-08-29 LAB
BILIRUBIN URINE: NEGATIVE
COLOR: YELLOW
COMMENT UA: NORMAL
GLUCOSE URINE: NEGATIVE
KETONES, URINE: NEGATIVE
LEUKOCYTE ESTERASE, URINE: NEGATIVE
NITRITE, URINE: NEGATIVE
PH UA: 6.5 (ref 5–8)
PROTEIN UA: NEGATIVE
SPECIFIC GRAVITY UA: 1.01 (ref 1–1.03)
TURBIDITY: CLEAR
URINE HGB: NEGATIVE
UROBILINOGEN, URINE: NORMAL

## 2018-08-29 PROCEDURE — 87491 CHLMYD TRACH DNA AMP PROBE: CPT

## 2018-08-29 PROCEDURE — 6360000002 HC RX W HCPCS: Performed by: OBSTETRICS & GYNECOLOGY

## 2018-08-29 PROCEDURE — 87081 CULTURE SCREEN ONLY: CPT

## 2018-08-29 PROCEDURE — 81003 URINALYSIS AUTO W/O SCOPE: CPT

## 2018-08-29 PROCEDURE — 87591 N.GONORRHOEAE DNA AMP PROB: CPT

## 2018-08-29 PROCEDURE — 99213 OFFICE O/P EST LOW 20 MIN: CPT

## 2018-08-29 RX ORDER — FAMOTIDINE 20 MG/1
20 TABLET, FILM COATED ORAL 2 TIMES DAILY PRN
Qty: 60 TABLET | Refills: 1 | Status: ON HOLD | OUTPATIENT
Start: 2018-08-29 | End: 2018-09-09

## 2018-08-29 RX ORDER — ACETAMINOPHEN 500 MG
1000 TABLET ORAL EVERY 6 HOURS PRN
Status: DISCONTINUED | OUTPATIENT
Start: 2018-08-29 | End: 2018-08-29 | Stop reason: HOSPADM

## 2018-08-29 RX ORDER — HYDROXYPROGESTERONE CAPROATE 250 MG/ML
250 INJECTION INTRAMUSCULAR
Status: DISCONTINUED | OUTPATIENT
Start: 2018-08-29 | End: 2018-08-29 | Stop reason: HOSPADM

## 2018-08-29 RX ADMIN — HYDROXYPROGESTERONE CAPROATE 250 MG: 250 INJECTION INTRAMUSCULAR at 19:45

## 2018-08-29 NOTE — H&P
OBSTETRICAL HISTORY Gideon Dickey    Date: 2018       Time: 6:21 PM   Patient Name: Heidi Howard     Patient : 1990  Room/Bed: [place] [address]    Admission Date/Time: 2018  5:50 PM      CC: Vaginal Pressure    HPI: Heidi Howard is a 29 y.o. [id number] at 35w1d who presents with complaint of new pelvic/vaginal pressure starting last night. The pressure worsens with standing and improves with lying down. Patient's history is significant for shortened cervix s/p Vaz cerclage placement and hx of  delivery (G2 @ 28 weeks). She denies recent intercourse. Patient has also been experiencing intermittent N/V and daily heartburn over the past week, but is not currently feeling symptomatic. The patient reports fetal movement is present, denies contractions, denies loss of fluid, denies vaginal bleeding. Patient denies any headache, visual changes, chest pain, difficulty breathing, RUQ pain, F/C, and pain/swelling in lower extremities. Denies any dysuria or vaginal discharge. Patient has been getting weekly Juli injections at her home by her nurse. She was due for Juli injection today, but has lost her medications. Patient is requesting another prescription for Malin. Pregnancy is complicated by shortened cervix s/p Vaz cerclage placement in 18, Hx of PTD (G2- 28 weeks), positive chlamydia on 3/29/18 (needs TOR), noncompliance. DATING:  LMP: Patient's last menstrual period was 2017.   Estimated Date of Delivery: 10/2/18   Based on: early ultrasound, at 6 6/7 weeks GA    PREGNANCY RISK FACTORS:  Patient Active Problem List   Diagnosis    Prenatal care, subsequent pregnancy, second trimester    H/O PTD (G2 @ 28w)    Rh+/RI/GBS unk    Short cervix    High-risk pregnancy in third trimester    Previous  delivery, antepartum, third trimester    HRP (high risk pregnancy), third trimester    32 weeks gestation of pregnancy    + Chlamydia 3/29/18 (no TOR)    Vaz Cervical cerclage placed 18    Noncompliance    Poor historian        Steroids Given In This Pregnancy:  no     REVIEW OF SYSTEMS:   Constitutional: negative fever, negative chills  HEENT: negative visual disturbances, negative headaches  Respiratory: negative dyspnea, negative cough  Cardiovascular: negative chest pain,  negative palpitations  Gastrointestinal: negative abdominal pain, negative RUQ pain, positive heartburn, positive intermittent N/V, negative diarrhea, negative constipation  Genitourinary: negative dysuria, negative vaginal discharge, positive pelvic pressure  Dermatological: negative rash  Hematologic: negative bruising  Immunologic/Lymphatic: negative recent illness, negative recent sick contact  Musculoskeletal: negative back pain, negative myalgias, negative arthralgias  Neurological:  negative dizziness, negative weakness  Behavior/Psych: negative depression, negative anxiety      OBSTETRICAL HISTORY:   Obstetric History       T0      L1     SAB1   TAB0   Ectopic0   Molar0   Multiple0   Live Births1       # Outcome Date GA Lbr Chema/2nd Weight Sex Delivery Anes PTL Lv   3 Current            2  10/08/11 28w0d  3 lb 9 oz (1.616 kg) M Vag-Spont EPI Y CASTILLO      Complications: Placenta previa,Placental abruption in third trimester   1 2009 13w0d                 PAST MEDICAL HISTORY:   has a past medical history of Abnormal Pap smear; Deviated septum; Endometriosis; GERD (gastroesophageal reflux disease); and Spondylisthesis. PAST SURGICAL HISTORY:   has a past surgical history that includes Dilation and curettage of uterus; Cholecystectomy; Colonoscopy; Upper gastrointestinal endoscopy; Colposcopy; hernia repair (); and pr cerclage cervix w preg,vag apprch (N/A, 2018). ALLERGIES:  is allergic to cephalexin. MEDICATIONS:  Prior to Admission medications    Medication Sig Start Date End Date Taking? Authorizing Provider   acetaminophen (TYLENOL) 500 MG tablet Take 1,000 mg by mouth every 6 hours as needed for Pain    Historical Provider, MD GARCIA 250 MG/ML OIL oil injection Inject 250 mg into the muscle once a week 5/17/18   Historical Provider, MD   Prenatal Vit-Fe Fumarate-FA (PNV PRENATAL PLUS MULTIVITAMIN) 27-1 MG TABS Take 1 tablet by mouth daily 1/23/18 8/27/18  Divine Lovell, APRN - CNP       FAMILY HISTORY:  family history includes Cancer in her father; Diabetes in her father. SOCIAL HISTORY:   reports that she has quit smoking. She smoked 0.50 packs per day. She has never used smokeless tobacco. She reports that she does not drink alcohol or use drugs. VITALS:  Vitals:    08/29/18 1803   BP: 135/81   Pulse: 102   Resp: 20   Temp: 98.6 °F (37 °C)   TempSrc: Oral         PHYSICAL EXAM:  Fetal Heart Monitor:  Baseline Heart Rate 140, moderate variability, present accelerations, absent decelerations  [place]: irregular uterine irritability. Contraction x1 noted. General appearance:  no apparent distress, alert and cooperative  Neurologic:  alert, oriented, normal speech, no focal findings or movement disorder noted  Lungs:  No increased work of breathing, good air exchange, clear to auscultation bilaterally, no crackles or wheezing  Heart:  regular rate and rhythm and no murmur    Abdomen:  soft, gravid, non-tender, no right upper quadrant tenderness, no CVA tenderness, uterus non-tender, no signs of abruption and no signs of chorioamnionitis  Extremities:  no calf tenderness, non edematous  Pelvic Exam:   Speculum: Normal external genitalia, normal vaginal mucosa, cervix visually closed with no bleeding, lesions or discharge. Cerclage in place, knot visualized at 12 o'clock position. Pooling absent.  (confirmed by senior resident)      DATA:  [place]: absent  Vaginal Bleeding: absent    Wet prep: Clue cells Absent , Trichomonas Absent   KOH:  Yeast or Hyphae Absent   Whiff Test: negative of Juli 18   - Juli injections weekly (patient did not receive her scheduled injection today)   - [place] injection given in triage    - Shortened CL 16mm on 18    Positive chlamydia 3/29/18   - Patient states she received antibiotics   - Needs TOR, collected and sent today    GERD   - Currently taking TUMS   - Prescription for Pepcid given today     Noncompliance    Patient Active Problem List    Diagnosis Date Noted    32 weeks gestation of pregnancy 2018    + Chlamydia 3/29/18 (no TOR) 2018    Vaz Cervical cerclage placed 2018     Knot at 12 o'clock      Noncompliance 2018    Poor historian 2018    HRP (high risk pregnancy), third trimester 2018    High-risk pregnancy in third trimester 2018    Previous  delivery, antepartum, third trimester 2018    Rh+/RI/GBS unk 2018    Short cervix 2018     17 mm CL found 18 in office. Patient currently taking Margene Willis H/O PTD (G2 @ 28w) 05/15/2018    Prenatal care, subsequent pregnancy, second trimester 2018       Patient may be discharged to home. Patient counseled on  labor precautions, adequate PO hydration, and fetal kick counts. All questions answered and concerns addressed. Patient vocalized understanding. Plan discussed with Dr. Neda Venegas, who is agreeable. Steroids given this admission: No    Risks, benefits, alternatives and possible complications have been discussed in detail with the patient. Admission, and post admission procedures and expectations were discussed in detail. All questions were answered. Attending's Name: Dr. Marla Delarosa,   Ob/Gyn Resident  2018, 6:21 PM       Resident Physician Statement  I have discussed the case, including pertinent history and exam findings with the above resident. I have personally seen the patient. I agree with the assessment, plan and orders as documented.  I have made

## 2018-08-30 ENCOUNTER — TELEPHONE (OUTPATIENT)
Dept: OBGYN CLINIC | Age: 28
End: 2018-08-30

## 2018-08-30 ENCOUNTER — PROCEDURE VISIT (OUTPATIENT)
Dept: OBGYN CLINIC | Age: 28
End: 2018-08-30
Payer: COMMERCIAL

## 2018-08-30 DIAGNOSIS — O09.299 H/O CERVICAL CERCLAGE, CURRENTLY PREGNANT: ICD-10-CM

## 2018-08-30 DIAGNOSIS — O09.213 PREVIOUS PRETERM DELIVERY, ANTEPARTUM, THIRD TRIMESTER: ICD-10-CM

## 2018-08-30 DIAGNOSIS — O09.213 PREVIOUS PRETERM DELIVERY IN THIRD TRIMESTER, ANTEPARTUM: ICD-10-CM

## 2018-08-30 DIAGNOSIS — Z98.890 H/O CERVICAL CERCLAGE, CURRENTLY PREGNANT: ICD-10-CM

## 2018-08-30 DIAGNOSIS — Z3A.35 35 WEEKS GESTATION OF PREGNANCY: ICD-10-CM

## 2018-08-30 DIAGNOSIS — N88.3 SHORT CERVIX: ICD-10-CM

## 2018-08-30 LAB
ABDOMINAL CIRCUMFERENCE: NORMAL CM
BIPARIETAL DIAMETER: NORMAL CM
C TRACH DNA GENITAL QL NAA+PROBE: NEGATIVE
ESTIMATED FETAL WEIGHT: NORMAL GRAMS
FEMORAL DIAMETER: NORMAL CM
HC/AC: NORMAL
HEAD CIRCUMFERENCE: NORMAL CM
N. GONORRHOEAE DNA: NEGATIVE

## 2018-08-30 PROCEDURE — 76817 TRANSVAGINAL US OBSTETRIC: CPT | Performed by: SPECIALIST

## 2018-08-30 PROCEDURE — 76818 FETAL BIOPHYS PROFILE W/NST: CPT | Performed by: SPECIALIST

## 2018-09-01 ENCOUNTER — HOSPITAL ENCOUNTER (OUTPATIENT)
Age: 28
Discharge: HOME | End: 2018-09-01
Attending: SPECIALIST | Admitting: SPECIALIST
Payer: COMMERCIAL

## 2018-09-01 VITALS
HEIGHT: 63 IN | HEART RATE: 107 BPM | RESPIRATION RATE: 18 BRPM | DIASTOLIC BLOOD PRESSURE: 78 MMHG | TEMPERATURE: 97.5 F | SYSTOLIC BLOOD PRESSURE: 126 MMHG | BODY MASS INDEX: 32.6 KG/M2 | WEIGHT: 184 LBS

## 2018-09-01 PROBLEM — Z3A.35 35 WEEKS GESTATION OF PREGNANCY: Status: ACTIVE | Noted: 2018-09-01

## 2018-09-01 PROCEDURE — 99213 OFFICE O/P EST LOW 20 MIN: CPT

## 2018-09-01 RX ORDER — ACETAMINOPHEN 500 MG
1000 TABLET ORAL EVERY 6 HOURS PRN
Status: DISCONTINUED | OUTPATIENT
Start: 2018-09-01 | End: 2018-09-01 | Stop reason: HOSPADM

## 2018-09-01 NOTE — H&P
OBSTETRICAL HISTORY Gideon Dickey    Date: 2018       Time: 2:53 PM   Patient Name: Leah Arnold     Patient : 1990  Room/Bed: [place] [address]    Admission Date/Time: 2018  2:40 PM      CC: Abdominal Contractions      HPI: Leah Arnold is a 29 y.o. [id number] at 35w4d who presents to labor and delivery complaining of abdominal contractions and vaginal bleeding. States the contractions started this morning at 11 am. She feels about 2-4 contractions each hour. She denies having any abdominal pain currently. Denies recent sexual intercourse. Drinks 64 ounces of water daily. Patient denies any headache, visual changes, difficulty breathing, RUQ pain, N/V, F/C, and pain/swelling in lower extremities. Denies any dysuria or vaginal discharge. The patient reports fetal movement is present, complains of contractions, denies loss of fluid, denies vaginal bleeding. Pregnancy is complicated by Vaz cervical cerclage (placed ), short cervix (1.65cm on last US), Hx of PTD ([email or web address]), Hx of Chlamydia(3/29 pos, TOR neg on ), noncompliance, GERD(Pepcid)    DATING:  LMP: Patient's last menstrual period was 2017.   Estimated Date of Delivery: 10/2/18   Based on: early ultrasound, at 6 6/7 weeks GA    PREGNANCY RISK FACTORS:  Patient Active Problem List   Diagnosis    H/O PTD (G2 @ 28w)    Rh+/RI/GBS unk    Short cervix    + Chlamydia 3/29/18 (NIA neg)    Vaz Cervical cerclage placed 18    Noncompliance    Poor historian    35 weeks gestation of pregnancy        Steroids Given In This Pregnancy:  no     REVIEW OF SYSTEMS:   Constitutional: negative fever, negative chills  HEENT: negative visual disturbances, negative headaches  Respiratory: negative dyspnea, negative cough  Cardiovascular: negative chest pain,  negative palpitations  Gastrointestinal: positive abdominal pain, negative RUQ pain, negative N/V, negative diarrhea, negative father. SOCIAL HISTORY:   reports that she has quit smoking. She smoked 0.50 packs per day. She has never used smokeless tobacco. She reports that she does not drink alcohol or use drugs.     VITALS:  Vitals:    09/01/18 1447   BP: 126/78   Pulse: 107   Resp: 18   Temp: 97.5 °F (36.4 °C)   TempSrc: Oral         PHYSICAL EXAM:  Fetal Heart Monitor:  Baseline Heart Rate 135, moderate variability, present accelerations, absent decelerations  [place]: contractions, irritability     General appearance:  no apparent distress, alert and cooperative  Neurologic:  alert, oriented, normal speech, no focal findings or movement disorder noted  Lungs:  No increased work of breathing, good air exchange, clear to auscultation bilaterally, no crackles or wheezing  Heart:  regular rate and rhythm and no murmur    Abdomen:  soft, gravid, non-tender, no right upper quadrant tenderness, no CVA tenderness, uterus non-tender, no signs of abruption and no signs of chorioamnionitis  Extremities:  no calf tenderness, non edematous, DTRs: normal    Pelvic Exam:   Speculum: cervix visibly appeared closed, cercalge knot visualized, no vaginal bleeding appreciated, no discharge present, no pooling, normal appearing vulva, normal appearing vaginal mucosa   Cervix Check:  Not indicated    OMM Structural Exam:  Chief Complaint:  Pregnancy    Anterior/ Posterior Spinal Curves: Lumbar Lordosis -  Increased  Scoliosis (Lateral Spinal Curves): None  Assessment Tool:  T= Tenderness, A= Asymmetry, R= Restricted Motion (A=Active, P=Passive), T=Tissue Texture Changes  Region Evaluated : Severity / Specific of Major Somatic Dysfunction  M99.03 Lumbar -  Minor TART - more than BG levels -   Major Correlations with:  Genitourinary  Structural Diagnosis: Increased lumbar lordosis  Treatment Plan: Outpatient       PRENATAL LAB RESULTS:   Blood Type/Rh: A pos  Antibody Screen: negative  Hemoglobin, Hematocrit, Platelets: 42.7 / 97.4 / 262  Rubella: immune  T. Cervical cerclage placed 6/1/18 08/13/2018     Knot at 12 o'clock      Noncompliance 08/13/2018    Poor historian 08/13/2018    Rh+/RI/GBS unk 05/31/2018    Short cervix 05/31/2018     16mm CL found 8/23/18 in office  17 mm CL found 5/31/18 in office. Patient currently taking Lavella Liming H/O PTD (G2 @ 28w) 05/15/2018       Plan discussed with Dr. Aldair Harvey, who is agreeable. Steroids given this admission: No    Risks, benefits, alternatives and possible complications have been discussed in detail with the patient. Admission, and post admission procedures and expectations were discussed in detail. All questions were answered.     Attending's Name: Dr. Kevyn Nassar DO  Ob/Gyn Resident  9/1/2018, 2:53 PM

## 2018-09-04 ENCOUNTER — ROUTINE PRENATAL (OUTPATIENT)
Dept: OBGYN CLINIC | Age: 28
End: 2018-09-04

## 2018-09-04 VITALS
SYSTOLIC BLOOD PRESSURE: 132 MMHG | WEIGHT: 184 LBS | DIASTOLIC BLOOD PRESSURE: 83 MMHG | HEART RATE: 84 BPM | BODY MASS INDEX: 32.59 KG/M2

## 2018-09-04 DIAGNOSIS — Z3A.36 36 WEEKS GESTATION OF PREGNANCY: ICD-10-CM

## 2018-09-04 DIAGNOSIS — Z34.83 ENCOUNTER FOR SUPERVISION OF OTHER NORMAL PREGNANCY, THIRD TRIMESTER: Primary | ICD-10-CM

## 2018-09-04 PROCEDURE — 0502F SUBSEQUENT PRENATAL CARE: CPT | Performed by: SPECIALIST

## 2018-09-09 ENCOUNTER — HOSPITAL ENCOUNTER (OUTPATIENT)
Age: 28
Discharge: HOME | End: 2018-09-09
Attending: SPECIALIST | Admitting: SPECIALIST
Payer: COMMERCIAL

## 2018-09-09 VITALS
TEMPERATURE: 98.4 F | SYSTOLIC BLOOD PRESSURE: 141 MMHG | HEART RATE: 94 BPM | DIASTOLIC BLOOD PRESSURE: 85 MMHG | RESPIRATION RATE: 16 BRPM

## 2018-09-09 PROBLEM — Z3A.36 36 WEEKS GESTATION OF PREGNANCY: Status: ACTIVE | Noted: 2018-09-09

## 2018-09-09 PROBLEM — Z3A.35 35 WEEKS GESTATION OF PREGNANCY: Status: RESOLVED | Noted: 2018-09-01 | Resolved: 2018-09-09

## 2018-09-09 PROBLEM — O13.9 GESTATIONAL HTN: Status: ACTIVE | Noted: 2018-09-09

## 2018-09-09 PROBLEM — O09.93 HRP (HIGH RISK PREGNANCY), THIRD TRIMESTER: Status: ACTIVE | Noted: 2018-09-09

## 2018-09-09 LAB
-: NORMAL
ABSOLUTE EOS #: 0.34 K/UL (ref 0–0.44)
ABSOLUTE IMMATURE GRANULOCYTE: 0.22 K/UL (ref 0–0.3)
ABSOLUTE LYMPH #: 3.23 K/UL (ref 1.1–3.7)
ABSOLUTE MONO #: 0.73 K/UL (ref 0.1–1.2)
ALBUMIN SERPL-MCNC: 3.4 G/DL (ref 3.5–5.2)
ALBUMIN/GLOBULIN RATIO: 1 (ref 1–2.5)
ALP BLD-CCNC: 172 U/L (ref 35–104)
ALT SERPL-CCNC: 13 U/L (ref 5–33)
AMORPHOUS: NORMAL
ANION GAP SERPL CALCULATED.3IONS-SCNC: 13 MMOL/L (ref 9–17)
AST SERPL-CCNC: 25 U/L
BACTERIA: NORMAL
BASOPHILS # BLD: 0 % (ref 0–2)
BASOPHILS ABSOLUTE: 0.07 K/UL (ref 0–0.2)
BILIRUB SERPL-MCNC: 0.17 MG/DL (ref 0.3–1.2)
BILIRUBIN URINE: NEGATIVE
BUN BLDV-MCNC: 7 MG/DL (ref 6–20)
BUN/CREAT BLD: ABNORMAL (ref 9–20)
CALCIUM SERPL-MCNC: 9.9 MG/DL (ref 8.6–10.4)
CASTS UA: NORMAL /LPF (ref 0–8)
CHLORIDE BLD-SCNC: 102 MMOL/L (ref 98–107)
CO2: 19 MMOL/L (ref 20–31)
COLOR: YELLOW
COMMENT UA: ABNORMAL
CREAT SERPL-MCNC: 0.47 MG/DL (ref 0.5–0.9)
CREATININE URINE: 27.1 MG/DL (ref 28–217)
CRYSTALS, UA: NORMAL /HPF
CULTURE: NORMAL
DIFFERENTIAL TYPE: ABNORMAL
EOSINOPHILS RELATIVE PERCENT: 2 % (ref 1–4)
EPITHELIAL CELLS UA: NORMAL /HPF (ref 0–5)
GFR AFRICAN AMERICAN: >60 ML/MIN
GFR NON-AFRICAN AMERICAN: >60 ML/MIN
GFR SERPL CREATININE-BSD FRML MDRD: ABNORMAL ML/MIN/{1.73_M2}
GFR SERPL CREATININE-BSD FRML MDRD: ABNORMAL ML/MIN/{1.73_M2}
GLUCOSE BLD-MCNC: 86 MG/DL (ref 70–99)
GLUCOSE URINE: NEGATIVE
HCT VFR BLD CALC: 36.9 % (ref 36.3–47.1)
HEMOGLOBIN: 11.7 G/DL (ref 11.9–15.1)
IMMATURE GRANULOCYTES: 1 %
KETONES, URINE: NEGATIVE
LEUKOCYTE ESTERASE, URINE: ABNORMAL
LYMPHOCYTES # BLD: 19 % (ref 24–43)
Lab: NORMAL
MCH RBC QN AUTO: 33.2 PG (ref 25.2–33.5)
MCHC RBC AUTO-ENTMCNC: 31.7 G/DL (ref 28.4–34.8)
MCV RBC AUTO: 104.8 FL (ref 82.6–102.9)
MONOCYTES # BLD: 4 % (ref 3–12)
MUCUS: NORMAL
NITRITE, URINE: NEGATIVE
NRBC AUTOMATED: 0 PER 100 WBC
OTHER OBSERVATIONS UA: NORMAL
PDW BLD-RTO: 13.9 % (ref 11.8–14.4)
PH UA: 7.5 (ref 5–8)
PLATELET # BLD: 218 K/UL (ref 138–453)
PLATELET ESTIMATE: ABNORMAL
PMV BLD AUTO: 11.9 FL (ref 8.1–13.5)
POTASSIUM SERPL-SCNC: 4.1 MMOL/L (ref 3.7–5.3)
PROTEIN UA: NEGATIVE
RBC # BLD: 3.52 M/UL (ref 3.95–5.11)
RBC # BLD: ABNORMAL 10*6/UL
RBC UA: NORMAL /HPF (ref 0–4)
RENAL EPITHELIAL, UA: NORMAL /HPF
SEG NEUTROPHILS: 74 % (ref 36–65)
SEGMENTED NEUTROPHILS ABSOLUTE COUNT: 12.63 K/UL (ref 1.5–8.1)
SODIUM BLD-SCNC: 134 MMOL/L (ref 135–144)
SPECIFIC GRAVITY UA: 1.01 (ref 1–1.03)
SPECIMEN DESCRIPTION: NORMAL
STATUS: NORMAL
TOTAL PROTEIN, URINE: 5 MG/DL
TOTAL PROTEIN: 6.7 G/DL (ref 6.4–8.3)
TRICHOMONAS: NORMAL
TURBIDITY: CLEAR
URINE HGB: NEGATIVE
URINE TOTAL PROTEIN CREATININE RATIO: 0.18 (ref 0–0.2)
UROBILINOGEN, URINE: NORMAL
WBC # BLD: 17.2 K/UL (ref 3.5–11.3)
WBC # BLD: ABNORMAL 10*3/UL
WBC UA: NORMAL /HPF (ref 0–5)
YEAST: NORMAL

## 2018-09-09 PROCEDURE — 81001 URINALYSIS AUTO W/SCOPE: CPT

## 2018-09-09 PROCEDURE — 84156 ASSAY OF PROTEIN URINE: CPT

## 2018-09-09 PROCEDURE — 87653 STREP B DNA AMP PROBE: CPT

## 2018-09-09 PROCEDURE — 80053 COMPREHEN METABOLIC PANEL: CPT

## 2018-09-09 PROCEDURE — 82570 ASSAY OF URINE CREATININE: CPT

## 2018-09-09 PROCEDURE — 99213 OFFICE O/P EST LOW 20 MIN: CPT

## 2018-09-09 PROCEDURE — 87086 URINE CULTURE/COLONY COUNT: CPT

## 2018-09-09 PROCEDURE — 36415 COLL VENOUS BLD VENIPUNCTURE: CPT

## 2018-09-09 PROCEDURE — 85025 COMPLETE CBC W/AUTO DIFF WBC: CPT

## 2018-09-09 RX ORDER — PROMETHAZINE HYDROCHLORIDE 12.5 MG/1
25 TABLET ORAL EVERY 6 HOURS PRN
Status: ON HOLD | COMMUNITY
End: 2018-09-23 | Stop reason: HOSPADM

## 2018-09-09 NOTE — H&P
OBSTETRICAL HISTORY [facility]    Date: 2018       Time: 6:53 PM   Patient Name: Vee Hernandez     Patient : 1990  Room[facility]Bed: [facility]-    Admission Date/Time: 2018  6:24 PM      CC: contractions (resolved), tailbone pain, and cramping     HPI: Vee Hernandez is a 29 y.o. [id number] at 36w5d who presents complaining of tailbone pain and contractions that started last night. She reports the contractions have resolved but she still has some lower abdominal cramping and tailbone discomfort. She has not tried anything for the pain. The patient reports fetal movement is present, complains of contractions, denies loss of fluid, denies vaginal bleeding. She denies any increase in vaginal discharge or urinary complaints. Upon chart review, patient now meets criteria for gHTN. She denies any s/s of preE such as HA, vision changes, and RUQ pain. She also denies fevers, chills, CP, SOB, or increased swelling. Pregnancy is complicated by Vaz cervical cerclage (placed ), short cervix (1.65cm on last US), Hx of PTD [email or web address]), Hx of Chlamydia(3/29 pos, TOR neg on ), noncompliance, GERD(Pepcid)    DATING:  LMP: Patient's last menstrual period was 2017.   Estimated Date of Delivery: 10/2/18   Based on: early ultrasound, at 6 6/7 weeks GA    PREGNANCY RISK FACTORS:  Patient Active Problem List   Diagnosis    H/O PTD (G2 @ 28w)    Rh+/RI/GBS unk    Short cervix    + Chlamydia 3/29/18 (NIA neg)    Vaz Cervical cerclage placed 18    Noncompliance    Poor historian    35 weeks gestation of pregnancy    HRP (high risk pregnancy), third trimester        Steroids Given In This Pregnancy:  no     REVIEW OF SYSTEMS:   Constitutional: negative fever, negative chills  HEENT: negative visual disturbances, negative headaches  Respiratory: negative dyspnea, negative cough  Cardiovascular: negative chest pain,  negative palpitations  Gastrointestinal: negative abdominal pain, positive abdominal cramping, negative RUQ pain, negative N/V, negative diarrhea, negative constipation  Genitourinary: negative dysuria, negative vaginal discharge  Dermatological: negative rash  Hematologic: negative bruising  Immunologic/Lymphatic: negative recent illness, negative recent sick contact  Musculoskeletal: negative back pain, negative myalgias, negative arthralgias  Neurological:  negative dizziness, negative weakness  Behavior/Psych: negative depression, negative anxiety      OBSTETRICAL HISTORY:   Obstetric History       T0      L1     SAB1   TAB0   Ectopic0   Molar0   Multiple0   Live Births1       # Outcome Date GA Lbr Chema/2nd Weight Sex Delivery Anes PTL Lv   3 Current            2  10/08/11 28w0d  3 lb 9 oz (1.616 kg) M Vag-Spont EPI Y CASTILLO      Complications: Placenta previa,Placental abruption in third trimester   1 2009 13w0d                 PAST MEDICAL HISTORY:   has a past medical history of Abnormal Pap smear; Anemia; Complication of anesthesia; Deviated septum; Endometriosis; GERD (gastroesophageal reflux disease); Postpartum depression; and Spondylisthesis. PAST SURGICAL HISTORY:   has a past surgical history that includes Dilation and curettage of uterus; Cholecystectomy; Colonoscopy; Upper gastrointestinal endoscopy; Colposcopy; hernia repair (); and pr cerclage cervix w preg,vag apprch (N/A, 2018). ALLERGIES:  is allergic to cephalexin. MEDICATIONS:  Prior to Admission medications    Medication Sig Start Date End Date Taking?  Authorizing Provider   famotidine (PEPCID) 20 MG tablet Take 1 tablet by mouth 2 times daily as needed (heartburn) 18   Shanel Copper, DO   acetaminophen (TYLENOL) 500 MG tablet Take 1,000 mg by mouth every 6 hours as needed for Pain    Historical Provider, MD   Prenatal Vit-Fe Fumarate-FA (PNV PRENATAL PLUS MULTIVITAMIN) 27-1 MG TABS Take 1 tablet by mouth risk pregnancy), third trimester 09/09/2018    35 weeks gestation of pregnancy 09/01/2018       Plan discussed with Dr. Allen Reece, who is agreeable. Steroids given this admission: No    Risks, benefits, alternatives and possible complications have been discussed in detail with the patient. Admission, and post admission procedures and expectations were discussed in detail. All questions were answered.     Attending's Name: Merly Hodges DO  Ob/Gyn Resident  9/9/2018, 6:53 PM

## 2018-09-10 LAB
CULTURE: NORMAL
DIRECT EXAM: ABNORMAL
Lab: ABNORMAL
Lab: NORMAL
SPECIMEN DESCRIPTION: ABNORMAL
SPECIMEN DESCRIPTION: NORMAL
STATUS: ABNORMAL
STATUS: NORMAL

## 2018-09-10 NOTE — FLOWSHEET NOTE
Pt SVE unchanged. Pre-eclampsia s/s discussed. Pt educated on fetal kick counts, and labor precautions. Pt agreeable. EFM removed, pt up to get dressed.  Awaiting orders from Dr. Alexander Valladares

## 2018-09-10 NOTE — FLOWSHEET NOTE
Pt given discharge instructions, labor precautions, and educated on fetal kick counts. Pt agreeable. Pt ambulates per self to leave L&D. Follow up in the office on the 13th.

## 2018-09-10 NOTE — PROGRESS NOTES
Recent Results (from the past 6 hour(s))   Urinalysis Reflex to Culture    Collection Time: 09/09/18  7:24 PM   Result Value Ref Range    Color, UA YELLOW YEL    Turbidity UA CLEAR CLEAR    Glucose, Ur NEGATIVE NEG    Bilirubin Urine NEGATIVE NEG    Ketones, Urine NEGATIVE NEG    Specific Gravity, UA 1.007 1.005 - 1.030    Urine Hgb NEGATIVE NEG    pH, UA 7.5 5.0 - 8.0    Protein, UA NEGATIVE NEG    Urobilinogen, Urine Normal NORM    Nitrite, Urine NEGATIVE NEG    Leukocyte Esterase, Urine TRACE (A) NEG    Urinalysis Comments NOT REPORTED    Microscopic Urinalysis    Collection Time: 09/09/18  7:24 PM   Result Value Ref Range    -          WBC, UA 0 TO 2 0 - 5 /HPF    RBC, UA None 0 - 4 /HPF    Casts UA NOT REPORTED 0 - 8 /LPF    Crystals UA NOT REPORTED NONE /HPF    Epithelial Cells UA 0 TO 2 0 - 5 /HPF    Renal Epithelial, Urine NOT REPORTED 0 /HPF    Bacteria, UA NOT REPORTED NONE    Mucus, UA NOT REPORTED NONE    Trichomonas, UA NOT REPORTED NONE    Amorphous, UA NOT REPORTED NONE    Other Observations UA NOT REPORTED NREQ    Yeast, UA NOT REPORTED NONE   Protein / creatinine ratio, urine    Collection Time: 09/09/18  7:24 PM   Result Value Ref Range    Total Protein, Urine 5 mg/dL    Creatinine, Ur 27.1 (L) 28.0 - 217.0 mg/dL    Urine Total Protein Creatinine Ratio 0.18 0.00 - 0.20   CBC WITH AUTO DIFFERENTIAL    Collection Time: 09/09/18  7:44 PM   Result Value Ref Range    WBC 17.2 (H) 3.5 - 11.3 k/uL    RBC 3.52 (L) 3.95 - 5.11 m/uL    Hemoglobin 11.7 (L) 11.9 - 15.1 g/dL    Hematocrit 36.9 36.3 - 47.1 %    .8 (H) 82.6 - 102.9 fL    MCH 33.2 25.2 - 33.5 pg    MCHC 31.7 28.4 - 34.8 g/dL    RDW 13.9 11.8 - 14.4 %    Platelets 311 354 - 618 k/uL    MPV 11.9 8.1 - 13.5 fL    NRBC Automated 0.0 0.0 per 100 WBC    Differential Type NOT REPORTED     Seg Neutrophils 74 (H) 36 - 65 %    Lymphocytes 19 (L) 24 - 43 %    Monocytes 4 3 - 12 %    Eosinophils % 2 1 - 4 %    Basophils 0 0 - 2 %    Immature

## 2018-09-11 PROBLEM — B95.1 POSITIVE GBS TEST: Status: ACTIVE | Noted: 2018-09-11

## 2018-09-13 ENCOUNTER — ROUTINE PRENATAL (OUTPATIENT)
Dept: OBGYN CLINIC | Age: 28
End: 2018-09-13

## 2018-09-13 VITALS
BODY MASS INDEX: 33.23 KG/M2 | SYSTOLIC BLOOD PRESSURE: 135 MMHG | DIASTOLIC BLOOD PRESSURE: 88 MMHG | WEIGHT: 187.6 LBS | HEART RATE: 104 BPM

## 2018-09-13 DIAGNOSIS — O13.3 PREGNANCY-INDUCED HYPERTENSION IN THIRD TRIMESTER: ICD-10-CM

## 2018-09-13 DIAGNOSIS — B95.1 POSITIVE GBS TEST: ICD-10-CM

## 2018-09-13 DIAGNOSIS — O09.93 HRP (HIGH RISK PREGNANCY), THIRD TRIMESTER: ICD-10-CM

## 2018-09-13 DIAGNOSIS — N88.3 SHORT CERVIX: Primary | ICD-10-CM

## 2018-09-13 DIAGNOSIS — Z3A.37 37 WEEKS GESTATION OF PREGNANCY: ICD-10-CM

## 2018-09-13 PROCEDURE — 0502F SUBSEQUENT PRENATAL CARE: CPT | Performed by: SPECIALIST

## 2018-09-13 NOTE — PROGRESS NOTES
noted. I agree with the chief complaint, past medical history, past surgical history, allergies, medications, social and family history as documented unless otherwise noted below.      Electronically signed by Linda Martinez MD on 9/13/2018 at 8:13 PM

## 2018-09-17 ENCOUNTER — ROUTINE PRENATAL (OUTPATIENT)
Dept: OBGYN CLINIC | Age: 28
End: 2018-09-17

## 2018-09-17 VITALS
SYSTOLIC BLOOD PRESSURE: 141 MMHG | HEART RATE: 100 BPM | BODY MASS INDEX: 33.66 KG/M2 | WEIGHT: 190 LBS | DIASTOLIC BLOOD PRESSURE: 83 MMHG

## 2018-09-17 DIAGNOSIS — Z3A.37 37 WEEKS GESTATION OF PREGNANCY: Primary | ICD-10-CM

## 2018-09-17 DIAGNOSIS — O09.92 HRP (HIGH RISK PREGNANCY), SECOND TRIMESTER: ICD-10-CM

## 2018-09-17 DIAGNOSIS — O13.3 PREGNANCY-INDUCED HYPERTENSION IN THIRD TRIMESTER: ICD-10-CM

## 2018-09-17 PROCEDURE — 0502F SUBSEQUENT PRENATAL CARE: CPT | Performed by: SPECIALIST

## 2018-09-17 NOTE — PROGRESS NOTES
noted. I agree with the chief complaint, past medical history, past surgical history, allergies, medications, social and family history as documented unless otherwise noted below.      Electronically signed by Arthur Michelle MD on 9/18/2018 at 10:14 AM

## 2018-09-20 ENCOUNTER — HOSPITAL ENCOUNTER (INPATIENT)
Age: 28
LOS: 2 days | Discharge: HOME | End: 2018-09-23
Attending: SPECIALIST | Admitting: SPECIALIST
Payer: COMMERCIAL

## 2018-09-20 PROBLEM — O09.90 HIGH RISK PREGNANCY, ANTEPARTUM: Status: ACTIVE | Noted: 2018-09-20

## 2018-09-20 LAB
ABO/RH: NORMAL
ABSOLUTE EOS #: 0.3 K/UL (ref 0–0.4)
ABSOLUTE IMMATURE GRANULOCYTE: ABNORMAL K/UL (ref 0–0.3)
ABSOLUTE LYMPH #: 2.7 K/UL (ref 1–4.8)
ABSOLUTE MONO #: 0.5 K/UL (ref 0.1–1.3)
ALBUMIN SERPL-MCNC: 3.4 G/DL (ref 3.5–5.2)
ALBUMIN/GLOBULIN RATIO: ABNORMAL (ref 1–2.5)
ALP BLD-CCNC: 177 U/L (ref 35–104)
ALT SERPL-CCNC: 8 U/L (ref 5–33)
AMPHETAMINE SCREEN URINE: NEGATIVE
ANION GAP SERPL CALCULATED.3IONS-SCNC: 15 MMOL/L (ref 9–17)
ANTIBODY SCREEN: NEGATIVE
ARM BAND NUMBER: NORMAL
AST SERPL-CCNC: 32 U/L
BARBITURATE SCREEN URINE: NEGATIVE
BASOPHILS # BLD: 1 % (ref 0–2)
BASOPHILS ABSOLUTE: 0.1 K/UL (ref 0–0.2)
BENZODIAZEPINE SCREEN, URINE: NEGATIVE
BILIRUB SERPL-MCNC: <0.15 MG/DL (ref 0.3–1.2)
BUN BLDV-MCNC: 11 MG/DL (ref 6–20)
BUN/CREAT BLD: ABNORMAL (ref 9–20)
BUPRENORPHINE URINE: NORMAL
CALCIUM SERPL-MCNC: 9.3 MG/DL (ref 8.6–10.4)
CANNABINOID SCREEN URINE: NEGATIVE
CHLORIDE BLD-SCNC: 105 MMOL/L (ref 98–107)
CO2: 18 MMOL/L (ref 20–31)
COCAINE METABOLITE, URINE: NEGATIVE
CREAT SERPL-MCNC: 0.62 MG/DL (ref 0.5–0.9)
CREATININE URINE: 108.1 MG/DL (ref 28–217)
CREATININE URINE: 108.1 MG/DL (ref 28–217)
DIFFERENTIAL TYPE: ABNORMAL
EOSINOPHILS RELATIVE PERCENT: 2 % (ref 0–4)
EXPIRATION DATE: NORMAL
GFR AFRICAN AMERICAN: >60 ML/MIN
GFR NON-AFRICAN AMERICAN: >60 ML/MIN
GFR SERPL CREATININE-BSD FRML MDRD: ABNORMAL ML/MIN/{1.73_M2}
GFR SERPL CREATININE-BSD FRML MDRD: ABNORMAL ML/MIN/{1.73_M2}
GLUCOSE BLD-MCNC: 140 MG/DL (ref 70–99)
HCT VFR BLD CALC: 33.6 % (ref 36–46)
HEMOGLOBIN: 11.2 G/DL (ref 12–16)
IMMATURE GRANULOCYTES: ABNORMAL %
LACTATE DEHYDROGENASE: 224 U/L (ref 135–214)
LYMPHOCYTES # BLD: 18 % (ref 24–44)
MCH RBC QN AUTO: 33.1 PG (ref 26–34)
MCHC RBC AUTO-ENTMCNC: 33.4 G/DL (ref 31–37)
MCV RBC AUTO: 99.2 FL (ref 80–100)
MDMA URINE: NORMAL
METHADONE SCREEN, URINE: NEGATIVE
METHAMPHETAMINE, URINE: NORMAL
MONOCYTES # BLD: 4 % (ref 1–7)
NRBC AUTOMATED: ABNORMAL PER 100 WBC
OPIATES, URINE: NEGATIVE
OXYCODONE SCREEN URINE: NEGATIVE
PDW BLD-RTO: 14.2 % (ref 11.5–14.9)
PHENCYCLIDINE, URINE: NEGATIVE
PLATELET # BLD: 193 K/UL (ref 150–450)
PLATELET ESTIMATE: ABNORMAL
PMV BLD AUTO: 10.8 FL (ref 6–12)
POTASSIUM SERPL-SCNC: 4.5 MMOL/L (ref 3.7–5.3)
PROPOXYPHENE, URINE: NORMAL
RBC # BLD: 3.39 M/UL (ref 4–5.2)
RBC # BLD: ABNORMAL 10*6/UL
SEG NEUTROPHILS: 75 % (ref 36–66)
SEGMENTED NEUTROPHILS ABSOLUTE COUNT: 11.1 K/UL (ref 1.3–9.1)
SODIUM BLD-SCNC: 138 MMOL/L (ref 135–144)
TEST INFORMATION: NORMAL
TOTAL PROTEIN, URINE: 24 MG/DL
TOTAL PROTEIN: 6.7 G/DL (ref 6.4–8.3)
TRICYCLIC ANTIDEPRESSANTS, UR: NORMAL
URIC ACID: 5.2 MG/DL (ref 2.4–5.7)
URINE TOTAL PROTEIN CREATININE RATIO: 0.22 (ref 0–0.2)
WBC # BLD: 14.8 K/UL (ref 3.5–11)
WBC # BLD: ABNORMAL 10*3/UL

## 2018-09-20 PROCEDURE — 85025 COMPLETE CBC W/AUTO DIFF WBC: CPT

## 2018-09-20 PROCEDURE — 59200 INSERT CERVICAL DILATOR: CPT

## 2018-09-20 PROCEDURE — 80307 DRUG TEST PRSMV CHEM ANLYZR: CPT

## 2018-09-20 PROCEDURE — 83615 LACTATE (LD) (LDH) ENZYME: CPT

## 2018-09-20 PROCEDURE — 36415 COLL VENOUS BLD VENIPUNCTURE: CPT

## 2018-09-20 PROCEDURE — 3E0P7VZ INTRODUCTION OF HORMONE INTO FEMALE REPRODUCTIVE, VIA NATURAL OR ARTIFICIAL OPENING: ICD-10-PCS | Performed by: SPECIALIST

## 2018-09-20 PROCEDURE — 86901 BLOOD TYPING SEROLOGIC RH(D): CPT

## 2018-09-20 PROCEDURE — 86900 BLOOD TYPING SEROLOGIC ABO: CPT

## 2018-09-20 PROCEDURE — 59400 OBSTETRICAL CARE: CPT | Performed by: SPECIALIST

## 2018-09-20 PROCEDURE — 76815 OB US LIMITED FETUS(S): CPT

## 2018-09-20 PROCEDURE — 82570 ASSAY OF URINE CREATININE: CPT

## 2018-09-20 PROCEDURE — 84156 ASSAY OF PROTEIN URINE: CPT

## 2018-09-20 PROCEDURE — 6370000000 HC RX 637 (ALT 250 FOR IP): Performed by: STUDENT IN AN ORGANIZED HEALTH CARE EDUCATION/TRAINING PROGRAM

## 2018-09-20 PROCEDURE — 84550 ASSAY OF BLOOD/URIC ACID: CPT

## 2018-09-20 PROCEDURE — 80053 COMPREHEN METABOLIC PANEL: CPT

## 2018-09-20 PROCEDURE — 86850 RBC ANTIBODY SCREEN: CPT

## 2018-09-20 PROCEDURE — 2580000003 HC RX 258: Performed by: STUDENT IN AN ORGANIZED HEALTH CARE EDUCATION/TRAINING PROGRAM

## 2018-09-20 PROCEDURE — 86780 TREPONEMA PALLIDUM: CPT

## 2018-09-20 RX ORDER — SODIUM CHLORIDE, SODIUM LACTATE, POTASSIUM CHLORIDE, CALCIUM CHLORIDE 600; 310; 30; 20 MG/100ML; MG/100ML; MG/100ML; MG/100ML
INJECTION, SOLUTION INTRAVENOUS CONTINUOUS
Status: DISCONTINUED | OUTPATIENT
Start: 2018-09-20 | End: 2018-09-21

## 2018-09-20 RX ORDER — ONDANSETRON 2 MG/ML
4 INJECTION INTRAMUSCULAR; INTRAVENOUS EVERY 6 HOURS PRN
Status: DISCONTINUED | OUTPATIENT
Start: 2018-09-20 | End: 2018-09-21

## 2018-09-20 RX ORDER — DIPHENHYDRAMINE HCL 25 MG
25 TABLET ORAL EVERY 6 HOURS PRN
Status: DISCONTINUED | OUTPATIENT
Start: 2018-09-20 | End: 2018-09-21

## 2018-09-20 RX ORDER — ACETAMINOPHEN 500 MG
1000 TABLET ORAL EVERY 6 HOURS PRN
Status: DISCONTINUED | OUTPATIENT
Start: 2018-09-20 | End: 2018-09-21

## 2018-09-20 RX ORDER — SODIUM CHLORIDE 0.9 % (FLUSH) 0.9 %
10 SYRINGE (ML) INJECTION EVERY 12 HOURS SCHEDULED
Status: DISCONTINUED | OUTPATIENT
Start: 2018-09-20 | End: 2018-09-21

## 2018-09-20 RX ORDER — SODIUM CHLORIDE 0.9 % (FLUSH) 0.9 %
10 SYRINGE (ML) INJECTION PRN
Status: DISCONTINUED | OUTPATIENT
Start: 2018-09-20 | End: 2018-09-21

## 2018-09-20 RX ADMIN — Medication 25 MCG: at 23:13

## 2018-09-20 RX ADMIN — SODIUM CHLORIDE, POTASSIUM CHLORIDE, SODIUM LACTATE AND CALCIUM CHLORIDE: 600; 310; 30; 20 INJECTION, SOLUTION INTRAVENOUS at 22:35

## 2018-09-21 ENCOUNTER — ANESTHESIA (OUTPATIENT)
Dept: LABOR AND DELIVERY | Age: 28
End: 2018-09-21
Payer: COMMERCIAL

## 2018-09-21 ENCOUNTER — ANESTHESIA EVENT (OUTPATIENT)
Dept: LABOR AND DELIVERY | Age: 28
End: 2018-09-21
Payer: COMMERCIAL

## 2018-09-21 LAB
HCT VFR BLD CALC: 29.8 % (ref 36–46)
HEMOGLOBIN: 10.4 G/DL (ref 12–16)
MCH RBC QN AUTO: 34.1 PG (ref 26–34)
MCHC RBC AUTO-ENTMCNC: 34.7 G/DL (ref 31–37)
MCV RBC AUTO: 98.1 FL (ref 80–100)
NRBC AUTOMATED: ABNORMAL PER 100 WBC
PDW BLD-RTO: 14 % (ref 11.5–14.9)
PLATELET # BLD: 179 K/UL (ref 150–450)
PMV BLD AUTO: 10.5 FL (ref 6–12)
RBC # BLD: 3.04 M/UL (ref 4–5.2)
T. PALLIDUM, IGG: NONREACTIVE
WBC # BLD: 16.4 K/UL (ref 3.5–11)

## 2018-09-21 PROCEDURE — 3E033VJ INTRODUCTION OF OTHER HORMONE INTO PERIPHERAL VEIN, PERCUTANEOUS APPROACH: ICD-10-PCS | Performed by: SPECIALIST

## 2018-09-21 PROCEDURE — 59200 INSERT CERVICAL DILATOR: CPT

## 2018-09-21 PROCEDURE — 6370000000 HC RX 637 (ALT 250 FOR IP): Performed by: STUDENT IN AN ORGANIZED HEALTH CARE EDUCATION/TRAINING PROGRAM

## 2018-09-21 PROCEDURE — 6360000002 HC RX W HCPCS: Performed by: STUDENT IN AN ORGANIZED HEALTH CARE EDUCATION/TRAINING PROGRAM

## 2018-09-21 PROCEDURE — 88307 TISSUE EXAM BY PATHOLOGIST: CPT

## 2018-09-21 PROCEDURE — 2500000003 HC RX 250 WO HCPCS: Performed by: ANESTHESIOLOGY

## 2018-09-21 PROCEDURE — 94762 N-INVAS EAR/PLS OXIMTRY CONT: CPT

## 2018-09-21 PROCEDURE — 2580000003 HC RX 258: Performed by: STUDENT IN AN ORGANIZED HEALTH CARE EDUCATION/TRAINING PROGRAM

## 2018-09-21 PROCEDURE — 85027 COMPLETE CBC AUTOMATED: CPT

## 2018-09-21 PROCEDURE — 3700000025 ANESTHESIA EPIDURAL BLOCK: Performed by: ANESTHESIOLOGY

## 2018-09-21 PROCEDURE — 10907ZC DRAINAGE OF AMNIOTIC FLUID, THERAPEUTIC FROM PRODUCTS OF CONCEPTION, VIA NATURAL OR ARTIFICIAL OPENING: ICD-10-PCS | Performed by: SPECIALIST

## 2018-09-21 PROCEDURE — 36415 COLL VENOUS BLD VENIPUNCTURE: CPT

## 2018-09-21 PROCEDURE — 0UQMXZZ REPAIR VULVA, EXTERNAL APPROACH: ICD-10-PCS | Performed by: SPECIALIST

## 2018-09-21 PROCEDURE — 1220000000 HC SEMI PRIVATE OB R&B

## 2018-09-21 PROCEDURE — 76815 OB US LIMITED FETUS(S): CPT

## 2018-09-21 RX ORDER — ONDANSETRON 2 MG/ML
4 INJECTION INTRAMUSCULAR; INTRAVENOUS EVERY 6 HOURS PRN
Status: DISCONTINUED | OUTPATIENT
Start: 2018-09-21 | End: 2018-09-21

## 2018-09-21 RX ORDER — NALOXONE HYDROCHLORIDE 0.4 MG/ML
0.4 INJECTION, SOLUTION INTRAMUSCULAR; INTRAVENOUS; SUBCUTANEOUS PRN
Status: DISCONTINUED | OUTPATIENT
Start: 2018-09-21 | End: 2018-09-21

## 2018-09-21 RX ORDER — SODIUM CHLORIDE 0.9 % (FLUSH) 0.9 %
10 SYRINGE (ML) INJECTION PRN
Status: DISCONTINUED | OUTPATIENT
Start: 2018-09-21 | End: 2018-09-23 | Stop reason: HOSPADM

## 2018-09-21 RX ORDER — NAPROXEN 500 MG/1
500 TABLET ORAL 2 TIMES DAILY WITH MEALS
Status: DISCONTINUED | OUTPATIENT
Start: 2018-09-21 | End: 2018-09-22

## 2018-09-21 RX ORDER — NALBUPHINE HCL 10 MG/ML
10 AMPUL (ML) INJECTION ONCE
Status: COMPLETED | OUTPATIENT
Start: 2018-09-21 | End: 2018-09-21

## 2018-09-21 RX ORDER — HYDROCODONE BITARTRATE AND ACETAMINOPHEN 5; 325 MG/1; MG/1
1 TABLET ORAL ONCE
Status: COMPLETED | OUTPATIENT
Start: 2018-09-21 | End: 2018-09-21

## 2018-09-21 RX ORDER — ONDANSETRON HYDROCHLORIDE 8 MG/1
8 TABLET, FILM COATED ORAL EVERY 8 HOURS PRN
Status: DISCONTINUED | OUTPATIENT
Start: 2018-09-21 | End: 2018-09-23 | Stop reason: HOSPADM

## 2018-09-21 RX ORDER — LANOLIN 100 %
OINTMENT (GRAM) TOPICAL PRN
Status: DISCONTINUED | OUTPATIENT
Start: 2018-09-21 | End: 2018-09-23 | Stop reason: HOSPADM

## 2018-09-21 RX ORDER — DOCUSATE SODIUM 100 MG/1
100 CAPSULE, LIQUID FILLED ORAL 2 TIMES DAILY
Status: DISCONTINUED | OUTPATIENT
Start: 2018-09-21 | End: 2018-09-23 | Stop reason: HOSPADM

## 2018-09-21 RX ORDER — NALBUPHINE HCL 10 MG/ML
5 AMPUL (ML) INJECTION EVERY 4 HOURS PRN
Status: DISCONTINUED | OUTPATIENT
Start: 2018-09-21 | End: 2018-09-21

## 2018-09-21 RX ORDER — ACETAMINOPHEN 325 MG/1
650 TABLET ORAL EVERY 4 HOURS PRN
Status: DISCONTINUED | OUTPATIENT
Start: 2018-09-21 | End: 2018-09-23 | Stop reason: HOSPADM

## 2018-09-21 RX ORDER — SODIUM CHLORIDE 0.9 % (FLUSH) 0.9 %
10 SYRINGE (ML) INJECTION EVERY 12 HOURS SCHEDULED
Status: DISCONTINUED | OUTPATIENT
Start: 2018-09-21 | End: 2018-09-23 | Stop reason: HOSPADM

## 2018-09-21 RX ADMIN — Medication 500 MILLI-UNITS/MIN: at 19:13

## 2018-09-21 RX ADMIN — HYDROCODONE BITARTRATE AND ACETAMINOPHEN 1 TABLET: 5; 325 TABLET ORAL at 19:52

## 2018-09-21 RX ADMIN — Medication 25 MCG: at 03:44

## 2018-09-21 RX ADMIN — DOCUSATE SODIUM 100 MG: 100 CAPSULE, LIQUID FILLED ORAL at 19:52

## 2018-09-21 RX ADMIN — DIPHENHYDRAMINE HCL 25 MG: 25 TABLET ORAL at 04:01

## 2018-09-21 RX ADMIN — Medication 8 ML/HR: at 17:36

## 2018-09-21 RX ADMIN — Medication 8 ML/HR: at 17:30

## 2018-09-21 RX ADMIN — NAPROXEN 500 MG: 500 TABLET ORAL at 23:26

## 2018-09-21 RX ADMIN — VANCOMYCIN HYDROCHLORIDE 1250 MG: 10 INJECTION, POWDER, LYOPHILIZED, FOR SOLUTION INTRAVENOUS at 00:49

## 2018-09-21 RX ADMIN — NALBUPHINE HYDROCHLORIDE 10 MG: 10 INJECTION, SOLUTION INTRAMUSCULAR; INTRAVENOUS; SUBCUTANEOUS at 05:16

## 2018-09-21 RX ADMIN — Medication 1 MILLI-UNITS/MIN: at 09:27

## 2018-09-21 RX ADMIN — NALBUPHINE HYDROCHLORIDE 10 MG: 10 INJECTION, SOLUTION INTRAMUSCULAR; INTRAVENOUS; SUBCUTANEOUS at 10:02

## 2018-09-21 RX ADMIN — VANCOMYCIN HYDROCHLORIDE 1250 MG: 10 INJECTION, POWDER, LYOPHILIZED, FOR SOLUTION INTRAVENOUS at 12:14

## 2018-09-21 ASSESSMENT — PAIN SCALES - GENERAL
PAINLEVEL_OUTOF10: 5
PAINLEVEL_OUTOF10: 5
PAINLEVEL_OUTOF10: 4
PAINLEVEL_OUTOF10: 6
PAINLEVEL_OUTOF10: 6

## 2018-09-21 ASSESSMENT — PAIN DESCRIPTION - PAIN TYPE
TYPE: ACUTE PAIN
TYPE: ACUTE PAIN

## 2018-09-21 ASSESSMENT — PAIN DESCRIPTION - LOCATION
LOCATION: ABDOMEN
LOCATION: ABDOMEN

## 2018-09-21 ASSESSMENT — PAIN DESCRIPTION - DESCRIPTORS
DESCRIPTORS: CRAMPING
DESCRIPTORS: CRAMPING

## 2018-09-21 ASSESSMENT — PAIN DESCRIPTION - PROGRESSION: CLINICAL_PROGRESSION: GRADUALLY WORSENING

## 2018-09-21 NOTE — PROGRESS NOTES
Labor Progress Note    Jana Verma is a 29 y.o. female  at 38w3d  The patient was seen and examined. Her pain is not well controlled. She reports fetal movement is present, complains of contractions, denies loss of fluid, denies vaginal bleeding.        Vital Signs:  Vitals:    18 2155 18 2251 18 0313   BP: 135/86  135/64   Pulse: 95  75   Resp: 16  16   Temp: 97 °F (36.1 °C)  98.2 °F (36.8 °C)   TempSrc: Infrared  Infrared   Weight:  190 lb (86.2 kg)    Height:  5' 3\" (1.6 m)        FHT: 145, moderate variability, accelerations present, decelerations absent  Contractions: regular, every 2-3 minutes  Cervical Exam: 3 cm dilated, 80% effaced, -1 out of 3 station    Membranes: Intact  Scalp Electrode in place: absent  Intrauterine Pressure Catheter in Place: absent    Interventions: none    Assessment/Plan:  Jana Verma is a 29 y.o. female [id number] at 38w3d here for IOL 2/2 gHTN              - GBS positive, vancomycin for GBS prophylaxis              - cEFM/toco               - IVF: LR @ 125 cc/hr               - S/P Cytotec 25 mcg PV x2              - BP WNL                      - Denies s/s of pre E               - Pre E labs WNL x1, P/C ratio 0.22   - Will order Nubain 10 mg IV for pain               - Continue expectant management        Attending updated and in agreement with andre Steiner DO  Ob/Gyn Resident  2018, 5:00 AM

## 2018-09-21 NOTE — ANESTHESIA PRE PROCEDURE
Department of Anesthesiology  Preprocedure Note       Name:  Tiffanie House   Age:  29 y.o.  :  1990                                          MRN:  [id number]         Date:  2018      Surgeon: * No surgeons listed *    Procedure: * No procedures listed *    Medications prior to admission:   Prior to Admission medications    Medication Sig Start Date End Date Taking?  Authorizing Provider   promethazine (PHENERGAN) 12.5 MG tablet Take 25 mg by mouth every 6 hours as needed for Nausea   Yes Historical Provider, MD   acetaminophen (TYLENOL) 500 MG tablet Take 1,000 mg by mouth every 6 hours as needed for Pain   Yes Historical Provider, MD   Prenatal Vit-Fe Fumarate-FA (PNV PRENATAL PLUS MULTIVITAMIN) 27-1 MG TABS Take 1 tablet by mouth daily 18  GOSIA Toney - CNP       Current medications:    Current Facility-Administered Medications   Medication Dose Route Frequency Provider Last Rate Last Dose    oxytocin (PITOCIN) 30 units in 500 mL infusion  1 julieth-units/min Intravenous Continuous Progga Wheat DO 20 mL/hr at 18 1657 20 julieth-units/min at 18 1657    naloxone (NARCAN) injection 0.4 mg  0.4 mg Intravenous PRN Deandre Davis MD        nalbuphine (NUBAIN) injection 5 mg  5 mg Intravenous Q4H PRN Deandre Davis MD        ondansetron (ZOFRAN) injection 4 mg  4 mg Intravenous Q6H PRN Deandre Davis MD        fentaNYL 2 mcg/mL and ropivacaine 0.2% in sodium chloride 0.9% 100 mL (OB) epidural  8 mL/hr Epidural Continuous Deandre Davis MD        diphenhydrAMINE (BENADRYL) tablet 25 mg  25 mg Oral Q6H PRN Marija N Barhorst, DO   25 mg at 18 0401    lactated ringers infusion   Intravenous Continuous Marija N Barhorst,  mL/hr at 18 2235      sodium chloride flush 0.9 % injection 10 mL  10 mL Intravenous 2 times per day Herb Bunn DO        sodium chloride flush 0.9 % injection 10 mL  10 mL Intravenous PRN Marija N Barhorst, DO        acetaminophen (TYLENOL) tablet 1,000 mg  1,000 mg Oral Q6H PRN Marija N Barhorst, DO        ondansetron (ZOFRAN) injection 4 mg  4 mg Intravenous Q6H PRN Marija N Barhorst, DO        oxytocin (PITOCIN) 30 units in 500 mL infusion  1 julieth-units/min Intravenous Continuous PRN Marija N Barhorst, DO        vancomycin (VANCOCIN) 1,250 mg in dextrose 5 % 250 mL IVPB  1,250 mg Intravenous Q12H Marija N Barhorst, DO   Stopped at 09/21/18 1345       Allergies:     Allergies   Allergen Reactions    Cephalexin Anaphylaxis       Problem List:    Patient Active Problem List   Diagnosis Code    H/O PTD (G2 @ 28w) O36.5    Rh+/RI/GBS pos O09.92    Short cervix N88.3    + Chlamydia 3/29/18 (NIA neg) O98.819, A74.9    S/P Vaz Cervical cerclage placed 6/1/18, removed 9/13 O34.30    Noncompliance Z91.19    Poor historian Z78.9    HRP (high risk pregnancy), third trimester O09.93    Gestational HTN (newly diagnosed) O13.9    36 weeks gestation of pregnancy Z3A.36    Positive GBS test B95.1    High risk pregnancy, antepartum O09.90       Past Medical History:        Diagnosis Date    Abnormal Pap smear     Anemia     Complication of anesthesia     low blood pressure and combative upon awaking from general anesthesia    Deviated septum     Endometriosis     GERD (gastroesophageal reflux disease)     Postpartum depression     Spondylisthesis        Past Surgical History:        Procedure Laterality Date    CHOLECYSTECTOMY      COLONOSCOPY      COLPOSCOPY      DILATION AND CURETTAGE OF UTERUS      HERNIA REPAIR  8263    umbilical    NM CERCLAGE CERVIX W PREG, W Laura Prince N/A 6/1/2018    CERVIX CERCLAGE performed by Aram Azar MD at [address]         Social History:    Social History   Substance Use Topics    Smoking status: Former Smoker     Packs/day: 0.50    Smokeless tobacco: Never Used    Alcohol use No      Comment: socially Counseling given: Not Answered      Vital Signs (Current):   Vitals:    09/21/18 1531 09/21/18 1602 09/21/18 1631 09/21/18 1657   BP: 134/68 139/82 136/77 137/80   Pulse: 65 68 75 62   Resp: 16 18 16   Temp:       TempSrc:       Weight:       Height:                                                  BP Readings from Last 3 Encounters:   09/21/18 137/80   09/17/18 (!) 141/83   09/13/18 135/88       NPO Status:                                                                                 BMI:   Wt Readings from Last 3 Encounters:   09/20/18 190 lb (86.2 kg)   09/17/18 190 lb (86.2 kg)   09/13/18 187 lb 9.6 oz (85.1 kg)     Body mass index is 33.66 kg/m². CBC:   Lab Results   Component Value Date    WBC 14.8 09/20/2018    RBC 3.39 09/20/2018    RBC 2.99 10/16/2011    HGB 11.2 09/20/2018    HCT 33.6 09/20/2018    MCV 99.2 09/20/2018    RDW 14.2 09/20/2018     09/20/2018     10/16/2011       CMP:   Lab Results   Component Value Date     09/20/2018    K 4.5 09/20/2018     09/20/2018    CO2 18 09/20/2018    BUN 11 09/20/2018    CREATININE 0.62 09/20/2018    GFRAA >60 09/20/2018    LABGLOM >60 09/20/2018    GLUCOSE 140 09/20/2018    PROT 6.7 09/20/2018    CALCIUM 9.3 09/20/2018    BILITOT <0.15 09/20/2018    ALKPHOS 177 09/20/2018    AST 32 09/20/2018    ALT 8 09/20/2018       POC Tests: No results for input(s): POCGLU, POCNA, POCK, POCCL, POCBUN, POCHEMO, POCHCT in the last 72 hours.     Coags: No results found for: PROTIME, INR, APTT    HCG (If Applicable):   Lab Results   Component Value Date    PREGTESTUR NEGATIVE 08/12/2013    HCGQUANT 15,195 (H) 02/05/2018        ABGs: No results found for: PHART, PO2ART, YWB0GEZ, HLH8VCG, BEART, D2LSBYAC     Type & Screen (If Applicable):  No results found for: [facility]    Anesthesia Evaluation  Patient summary reviewed and Nursing notes reviewed  Airway: Mallampati: II  TM distance: >3 FB   Neck ROM: full  Mouth opening: > = 3 FB Dental:

## 2018-09-21 NOTE — PROGRESS NOTES
Patient had 2 severe range blood pressures with normal repeat. 1728: 160/103  Patient was getting epidural   1857: 160/81 Patient pushing with contractions    Patient asymptomatic. Will continue to monitor BP.  Low suspicion of preeclampsia    Jaylen Mina, PGY3  OB-GYN Resident

## 2018-09-21 NOTE — PROGRESS NOTES
Labor Progress Note    Jana Verma is a 29 y.o. female  at 38w3d  The patient was seen and examined. Her pain is well controlled. She reports fetal movement is present, complains of contractions, denies loss of fluid, denies vaginal bleeding. Vital Signs:  Vitals:    18 1031 18 1101 18 1133 18 1201   BP: 130/70 (!) 119/59 (!) 141/67 (!) 140/65   Pulse: 85 82 75 66   Resp: 16 16 16 16   Temp:       TempSrc:       Weight:       Height:           FHT: 145, moderate variability, accelerations present, decelerations absent  Contractions: regular, every 1-3 minutes  Cervical Exam: 4 cm dilated, 90% effaced, -1 (out of 3) station   Pitocin: @ 8 mu/min    Membranes: Intact  Scalp Electrode in place: absent  Intrauterine Pressure Catheter in Place: absent    Interventions: none    Assessment/Plan:  Jana Verma is a 29 y.o. female  at 38w3d here for IOL 2/2 gHTN (newly diagnosed)    - GBS positive, vancomycin for GBS prophylaxis   - cEFM and TOCO   - IVF (LR @ 125 cc/hr)    - Continue Pitocin per protocol    - S/P Cytotec 25 mcg PV x 2   - S/P Nubain IV 10 mg x2   - Continue expectant management     Gestational Hypertension (newly diagnosed)    - Blood pressures elevated 140s/60s this AM, no severe range BPs    - PreE labs WNL x1; P/C ratio 0.22   - Denies s/s of pre-eclampsia   - Will continue to monitor     Attending updated and in agreement with plan.      Sindy Morales, DO  Ob/Gyn Resident  2018, 12:13 PM

## 2018-09-21 NOTE — PROGRESS NOTES
Labor Progress Note    Jose G Fischer is a 29 y.o. female  at 38w3d  The patient was seen and examined. Her pain is well controlled. She reports fetal movement is present, complains of contractions, denies loss of fluid, denies vaginal bleeding. Vital Signs:  Vitals:    18 0927 18 1002 18 1031 18 1101   BP: (!) 129/90 131/78 130/70 (!) 119/59   Pulse: 85 80 85 82   Resp: 18 16 16 16   Temp: 97.9 °F (36.6 °C)      TempSrc: Infrared      Weight:       Height:           FHT: 135, moderate variability, accelerations present, decelerations absent  Contractions: regular, every 2 minutes  Cervical Exam: deferred   Pitocin: @ 6 mu/min    Membranes: Intact  Scalp Electrode in place: absent  Intrauterine Pressure Catheter in Place: absent    Interventions: none    Assessment/Plan:  Jose G Fischer is a 29 y.o. female  at 38w3d here for IOL 2/2 gHTN (newly diagnosed)    - GBS positive, vancomycin for GBS prophylaxis   - cEFM and TOCO   - IVF (LR @ 125 cc/hr)    - Continue Pitocin per protocol    - S/P Cytotec 25 mcg PV x 2   - S/P Nubain IV 10 mg x2   - Continue expectant management     Gestational Hypertension (newly diagnosed)    - Blood pressures normotensive    - PreE labs WNL x1; P/C ratio 0.22   - Denies s/s of pre-eclampsia   - Will continue to monitor     Attending updated and in agreement with plan.      Apryl Mabry DO  Ob/Gyn Resident  2018, 11:34 AM

## 2018-09-21 NOTE — DISCHARGE SUMMARY
Obstetric Discharge Summary  [facility]    Patient Name: Heidi Howard  Patient : 1990  Primary Care Physician: No primary care provider on file. Admit Date: 2018    Principal Diagnosis: IUP at 38w2d, admitted for Induction of Labor secondary to gestational hypertension      Her pregnancy has been complicated by:   Patient Active Problem List   Diagnosis    H/O PTD (G2 @ 28w)    Rh+/RI/GBS pos    Short cervix    + Chlamydia 3/29/18 (NAI neg)    S/P Vaz Cervical cerclage placed 18, removed     Noncompliance    Poor historian    HRP (high risk pregnancy), third trimester    Gestational HTN (newly diagnosed)    36 weeks gestation of pregnancy    Positive GBS test    High risk pregnancy, antepartum    18  F Apg  Wt 7#7       Infection Present?: No  Hospital Acquired: No    Surgical Operations & Procedures:  [x] Pitocin Induction of Labor  [] Pitocin Augmentation of Labor  [x] Prostaglandin Induction of Labor  [] Mechanical Induction of Labor  [x] Artificial Rupture of Membranes  [] Intrauterine Pressure Catheter  [] Fetal Scalp Electrode  [] Amnioinfusion  Analgesia: epidural  Delivery Type: Spontaneous Vaginal Delivery: See Labor and Delivery Summary   Laceration(s): Bilateral labial lacerations; reapproximated with 3-0 Vicryl    Consultations: Anesthesia    Pertinent Findings & Procedures:   Heidi Howard is a 29 y.o. female  at 38w2d admitted for induction of labor secondary to gestational hypertenstion; pre-eclampsia labs were obtained on admission and found to be WNL; P/C ratio 0.22 (). She received Vancomycin for GBS prophylaxis due to allergy to Cephalosporin with anaphylaxis, Cytotec 25 mcg PV x2, Nubain x2, Pitocin, AROM. She delivered by spontaneous vaginal a Live Born infant on 18.        Information for the patient's :  Michael Mccann [[id number]]   female  Birth Weight: 7 lb 6.9 oz (3.37 kg)        Apgars: 8 at 1 minute and 9 at 5 minutes. Postpartum course: Patient noted to have an occasional severe range BP, with repeat blood pressures in the normal range. Repeat Preeclampsia labs postpartum were WNL; P/C ratio 0.09 (9/22/18)    Course of patient: uncomplicated    Discharge to: Home    Readmission planned: no     Recommendations on Discharge:     Medications:    Cristi Santiago   Home Medication Instructions DCA:[id number]    Printed on:09/23/18 8501   Medication Information                      acetaminophen (TYLENOL) 500 MG tablet  Take 1,000 mg by mouth every 6 hours as needed for Pain             docusate sodium (COLACE, DULCOLAX) 100 MG CAPS  Take 100 mg by mouth 2 times daily             ibuprofen (ADVIL;MOTRIN) 800 MG tablet  Take 1 tablet by mouth every 6 hours as needed for Pain             Prenatal Vit-Fe Fumarate-FA (PNV PRENATAL PLUS MULTIVITAMIN) 27-1 MG TABS  Take 1 tablet by mouth daily                   Activity: pelvic rest x 6 weeks, no lifting greater than 15 lbs  Diet: regular diet  Follow up: 1 week for blood pressure check      Condition on discharge: stable    Discharge date: 9/23/2018      Yoshi Hughes DO  Ob/Gyn Resident    Comments:  Home care and follow-up care were reviewed. Pelvic rest, and birth control were reviewed. Signs and symptoms of mastitis and post partum depression were reviewed. The patient is to notify her physician if any of these occur. The patient was counseled on secondary smoke risks and the increased risk of sudden infant death syndrome and respiratory problems to her baby with exposure. She was counseled on various alternate recommendations to decrease the exposure to secondary smoke to her children.

## 2018-09-21 NOTE — L&D DELIVERY NOTE
Cord    Vessels:  3 Vessels  Complications:  None  Delayed Cord Clamping?:  Yes  Cord Clamped Date/Time:  2018  Cord Blood Disposition:  Lab  Gases Sent?:  Yes  Stem Cell Collection (by provider): No     Placenta    Date/time:  2018  Removal:  Spontaneous  Appearance:  Intact  Disposition:  Pathology     Delivery Resuscitation    Method:  Bulb Suction, Stimulation     Apgars    Living status:  Living  Apgars   1 Minute:   5 Minute:   10 Minute 15 Minute 20 Minute   Skin Color: 0  1       Heart Rate: 2  2       Reflex Irritability: 2  2       Muscle Tone: 2  2       Respiratory Effort: 2  2       Total: 8  9               Apgars Assigned By:  Angelique Wagner RN     [facility] Measurements    Weight:  3370 g  Length:  53 cm  Head circumference:  35 cm  Chest circumference:  35 cm  Abdominal girth:  34.5 cm     Delivery Information    Episiotomy:  None  Surgical or additional est. blood loss (mL):  0 (View Only):  Edit in Flowsheets   Combined est. blood loss (mL):  0     Vaginal Delivery Counts    Initial count personnel:  DOLLY BARNARD  Initial count verified by:  DR Su Maloney   4x4:   Needles:   Instruments:   Lap Pads:   Sponges:     Initial counts:          Final counts:          Final count personnel:  Prince Meraz  Final count verified by:  Nohemy Melgar  Accurate final count?:  Yes     Other Procedures    Procedures:  None            Vaginal Delivery Note  Department of Obstetrics and Gynecology  [facility]       Patient: Warden Benitez   : 1990  MRN: [id number]   Date of delivery: 18     Pre-operative Diagnosis: Erla Favors Goucher [id number] at 38w3d, Term pregnancy   gHTN (newly diagnosed on admission)  Short Cervix  S/P Vaz Cervical cerclage placed 18, removed     + Chlamydia 3/29/18 (NIA neg)     Post-operative Diagnosis:  Same as above, delivered viable female    Delivering Obstetrician & Assistant(s): Dr. Brad Archibald;  Jaylen Mina DO    Infant Information: WT: 7#7  Apgars:  1 min: 8  5 min: 9      Anesthesia:  epidural anesthesia    Application and Delivery:    She was known to be GBS positive and received antibiotic prophylaxis. The patient progressed well, received an epidural, became complete and felt the urge to push. After pushing with contractions the fetal head delivered Cephalic, left occiput anterior over an intact perineum, nuchal cord was not present however fetal hand noted under chin. The anterior, then posterior shoulder delivered easily and atraumatically followed by the rest of the infant. Nose and mouth suctioned with bulb suction, infant was stimulated and dried. Cord was clamped and cut and infant was attended by RN for evaluation. The delivery of the placenta was spontaneous and appeared intact, whole and that the umbilical cord had three vessels noted. Pitocin was started. The vagina was swept of all clots and debris. The perineum and vagina were evaluated and a bilateral labial laceration was found and repaired in standard fashion with 3-0 vicryl. Mother and baby tolerated procedure well. Dr. Aimee Gonzales was present for entire delivery.     Delivery Summary:  Labor & Delivery Summary  Dilation Complete Date: 09/21/18  Dilation Complete Time: 2279    Specimen: placenta sent to pathology, cord blood and cord gases  Estimated blood loss:  250ml  Condition:  infant stable and mother stable  Counts: instrument and sponge counts correct  Blood Type and Rh: A POSITIVE    Rubella Immunity Status: [facility],   Ob/Gyn Resident  9/21/2018, 7:37 PM

## 2018-09-21 NOTE — PROGRESS NOTES
Labor Progress Note    Daniel Jackson is a 29 y.o. female  at 38w3d  The patient was seen and examined. Her pain is not well controlled and she is requesting another dose of nubain. She reports fetal movement is present, complains of contractions, denies loss of fluid, denies vaginal bleeding. Vital Signs:  Vitals:    18 2155 18 2251 18 0313   BP: 135/86  135/64   Pulse: 95  75   Resp: 16  16   Temp: 97 °F (36.1 °C)  98.2 °F (36.8 °C)   TempSrc: Infrared  Infrared   Weight:  190 lb (86.2 kg)    Height:  5' 3\" (1.6 m)        FHT: 140, moderate variability, accelerations present, decelerations absent  Contractions: regular, every 2 minutes  Cervical Exam: 3-4 cm dilated, 80% effaced, -1 (out of 3) station  Pitocin: @ 0 mu/min    Membranes: Intact  Scalp Electrode in place: absent  Intrauterine Pressure Catheter in Place: absent    Interventions: none    Assessment/Plan:  Daniel Jackson is a 29 y.o. female [id number] at 38w3d here for IOL 2/2 gHTN (newly diagnosed)    - GBS positive, vancomycin for GBS prophylaxis   - cEFM and TOCO   - IVF (LR @ 125 cc/hr)    - Pitocin protocol ordered    - S/P Cytotec 25 mcg PV x 2   - S/P Nubain IV 10 mg x1, will order another dose now for pain   - Continue expectant management     Gestational Hypertension (newly diagnosed)    - Blood pressures normotensive    - PreE labs WNL x1; P/C ratio 0.22   - Denies s/s of pre-eclampsia   - Will continue to monitor     Attending updated and in agreement with plan.      Blily Villareal DO  Ob/Gyn Resident  2018, 8:18 AM

## 2018-09-21 NOTE — PROGRESS NOTES
Labor Progress Note    Reina Zhao is a 29 y.o. female  at 38w3d  The patient was seen and examined. She reports her headache and blurred vision have improved. She denies RUQ pain. Her pain is well controlled. She reports fetal movement is present, complains of contractions, denies loss of fluid, denies vaginal bleeding.        Vital Signs:  Vitals:    18 2155 18 2251   BP: 135/86    Pulse: 95    Resp: 16    Temp: 97 °F (36.1 °C)    TempSrc: Infrared    Weight:  190 lb (86.2 kg)   Height:  5' 3\" (1.6 m)         FHT: 150, moderate variability, accelerations present, decelerations absent  Contractions: regular, every 5-7 minutes  Cervical Exam: 2 cm dilated, 80% effaced, -1 out of 3 station    Membranes: Intact  Scalp Electrode in place: absent  Intrauterine Pressure Catheter in Place: absent    Interventions: none    Assessment/Plan:  Reina Zhao is a 29 y.o. female [id number] at 38w3d here for IOL 2/2 gHTN   - GBS positive, vancomycin for GBS prophylaxis   - cEFM/toco    - IVF: LR @ 125 cc/hr    - S/P Cytotec 25 mcg PV, will give another dose of Cytotec 25 mcg PV   - BP WNL     - Denies s/s of pre E    - Pre E labs WNL x1, P/C ratio 0.22   - Continue expectant management       Kevin Gee DO  Ob/Gyn Resident  2018, 3:07 AM

## 2018-09-21 NOTE — H&P
OBSTETRICAL HISTORY [address]    Date: 2018       Time: 10:42 PM   Patient Name: Marlo Hodges     Patient : 1990  Room/Bed: [facility]/2296-29    Admission Date/Time: 2018  9:42 PM      CC: Headache, blurred vision, swelling, contractions      HPI: Marlo Hodges is a 29 y.o. [id number] at 38w2d who presents c/o headache, blurred vision, swelling, and contractions that started this afternoon. She states her headache was initially a 6/10, she took Tylenol at 7pm and it improved some, but still remained a 3/10. She also admits to new onset blurry vision today, swelling in her hands, feet, and face, and irregular contractions every 15 min, they are not increasing in intensity or frequency. She denies RUQ pain, CP, SOB N/V, fever, chills, dysuria, or vaginal discharge. The patient reports fetal movement is present, complains of contractions, denies loss of fluid, denies vaginal bleeding. Pregnancy is complicated by newly diagnosed gHTN (patient had pre E labs on  that were WNL), H/O sPTD (G2 @ 28 weeks, has been on Ponce injections in this pregnancy), short cervix, H/O Vaz Cerclage (placed 18, removed 18), H/O chlamydia (positive 3/29/18, TOR negative 18), and noncompliance. DATING:  LMP: Patient's last menstrual period was 2017.   Estimated Date of Delivery: 10/2/18   Based on: early ultrasound, at 6 6/7 weeks GA    PREGNANCY RISK FACTORS:  Patient Active Problem List   Diagnosis    H/O PTD (G2 @ 28w)    Rh+/RI/GBS pos    Short cervix    + Chlamydia 3/29/18 (NIA neg)    S/P Vaz Cervical cerclage placed 18, removed     Noncompliance    Poor historian    HRP (high risk pregnancy), third trimester    Gestational HTN (newly diagnosed)    36 weeks gestation of pregnancy    Positive GBS test    High risk pregnancy, antepartum        Steroids Given In This Pregnancy:  no     REVIEW OF SYSTEMS: this pregnancy    Short cervix   - CL 1.65 cm on 8/30    H/O Vaz Cerclage   - Placed 6/1/18, removed 9/13/18    H/O Chlamydia   - Positive 3/29/18   - TOR negative 8/29/18    Noncompliance    Patient Active Problem List    Diagnosis Date Noted    Gestational HTN (newly diagnosed) 09/09/2018     Priority: High     -Upon chart review patient had an elevated BP on 5/22 @21wks and 9/4 @36w0d meeting criteria. PreE labs drawn on 9/9/2018 and were wnl. P/C ratio 0.18.       S/P Vaz Cervical cerclage placed 6/1/18, removed 9/13 08/13/2018     Priority: High     Knot at 12 o'clock      Short cervix 05/31/2018     Priority: High     16mm CL found 8/23/18 in office  17 mm CL found 5/31/18 in office. Patient currently taking Sidonie Cluck H/O PTD (G2 @ 28w) 05/15/2018     Priority: High    + Chlamydia 3/29/18 (NIA neg) 08/13/2018     Priority: Medium     8/29/18 NIA negative       Noncompliance 08/13/2018     Priority: Low    High risk pregnancy, antepartum 09/20/2018    Positive GBS test 09/11/2018     Will need treated in labor       HRP (high risk pregnancy), third trimester 09/09/2018    36 weeks gestation of pregnancy 09/09/2018    Poor historian 08/13/2018    Rh+/RI/GBS pos 05/31/2018       Plan discussed with Dr. Madonna Garcia, who is agreeable. Steroids given this admission: No    Risks, benefits, alternatives and possible complications have been discussed in detail with the patient. Admission, and post admission procedures and expectations were discussed in detail. All questions were answered.     Attending's Name: Dr. Opal Mondragon DO  Ob/Gyn Resident  9/20/2018, 10:42 PM

## 2018-09-21 NOTE — PROGRESS NOTES
Labor Progress Note    Kaykay Frost is a 29 y.o. female  at 38w3d  The patient was seen and examined. Her pain is well controlled with epidural. She feels the urge to push. She reports fetal movement is present, complains of contractions, complains of loss of fluid, denies vaginal bleeding. Vital Signs:  Vitals:    18 1733 18 1734 18 1758 18 1812   BP:   118/68 116/71   Pulse:   90 83   Resp:       Temp: 97.3 °F (36.3 °C)      TempSrc:       SpO2:  99%     Weight:       Height:             FHT: 140, moderate variability, accelerations present, early decelerations   Contractions: regular, every 2-5 minutes  Cervical Exam: 7-8 cm dilated, 90 effaced, +1 station  Pitocin: @ 20 mu/min    Membranes: Ruptured clear fluid  Scalp Electrode in place: absent  Intrauterine Pressure Catheter in Place: absent    Interventions: none    Assessment/Plan:  Kaykay Frost is a 29 y.o. female  at 38w3d IOL 2/2 gHTN (newly diagnosed)               - GBS positive, vancomycin for GBS prophylaxis              - cEFM and TOCO              - IVF (LR @ 125 cc/hr)    - Epidural in place              - AROM @ 1700              - Continue Pitocin per protocol               - S/P Cytotec 25 mcg PV x 2              - S/P Nubain IV 10 mg x2              - Continue expectant management      Gestational Hypertension (newly diagnosed)               - Blood pressures elevated 140s/60s this AM, no severe range BPs               - PreE labs WNL x1; P/C ratio 0.22              - Denies s/s of pre-eclampsia              - Will continue to monitor      Attending updated and in agreement with plan.   Dr. Alexander Valladares en route    Elham Shea,   Ob/Gyn Resident  2018, 6:14 PM

## 2018-09-22 LAB
ALBUMIN SERPL-MCNC: 2.7 G/DL (ref 3.5–5.2)
ALBUMIN/GLOBULIN RATIO: ABNORMAL (ref 1–2.5)
ALP BLD-CCNC: 153 U/L (ref 35–104)
ALT SERPL-CCNC: 12 U/L (ref 5–33)
ANION GAP SERPL CALCULATED.3IONS-SCNC: 12 MMOL/L (ref 9–17)
AST SERPL-CCNC: 26 U/L
BILIRUB SERPL-MCNC: 0.16 MG/DL (ref 0.3–1.2)
BUN BLDV-MCNC: 13 MG/DL (ref 6–20)
BUN/CREAT BLD: ABNORMAL (ref 9–20)
CALCIUM SERPL-MCNC: 8.8 MG/DL (ref 8.6–10.4)
CHLORIDE BLD-SCNC: 104 MMOL/L (ref 98–107)
CO2: 20 MMOL/L (ref 20–31)
CREAT SERPL-MCNC: 0.68 MG/DL (ref 0.5–0.9)
CREATININE URINE: 142.9 MG/DL (ref 28–217)
GFR AFRICAN AMERICAN: >60 ML/MIN
GFR NON-AFRICAN AMERICAN: >60 ML/MIN
GFR SERPL CREATININE-BSD FRML MDRD: ABNORMAL ML/MIN/{1.73_M2}
GFR SERPL CREATININE-BSD FRML MDRD: ABNORMAL ML/MIN/{1.73_M2}
GLUCOSE BLD-MCNC: 98 MG/DL (ref 70–99)
HCT VFR BLD CALC: 29.6 % (ref 36–46)
HEMOGLOBIN: 10.1 G/DL (ref 12–16)
MCH RBC QN AUTO: 33.8 PG (ref 26–34)
MCHC RBC AUTO-ENTMCNC: 34.1 G/DL (ref 31–37)
MCV RBC AUTO: 99.1 FL (ref 80–100)
NRBC AUTOMATED: ABNORMAL PER 100 WBC
PDW BLD-RTO: 14.2 % (ref 11.5–14.9)
PLATELET # BLD: 175 K/UL (ref 150–450)
PMV BLD AUTO: 10.5 FL (ref 6–12)
POTASSIUM SERPL-SCNC: 4.2 MMOL/L (ref 3.7–5.3)
RBC # BLD: 2.99 M/UL (ref 4–5.2)
SODIUM BLD-SCNC: 136 MMOL/L (ref 135–144)
TOTAL PROTEIN, URINE: 13 MG/DL
TOTAL PROTEIN: 5.5 G/DL (ref 6.4–8.3)
URINE TOTAL PROTEIN CREATININE RATIO: 0.09 (ref 0–0.2)
WBC # BLD: 13.9 K/UL (ref 3.5–11)

## 2018-09-22 PROCEDURE — 51701 INSERT BLADDER CATHETER: CPT

## 2018-09-22 PROCEDURE — 1220000000 HC SEMI PRIVATE OB R&B

## 2018-09-22 PROCEDURE — 36415 COLL VENOUS BLD VENIPUNCTURE: CPT

## 2018-09-22 PROCEDURE — 6370000000 HC RX 637 (ALT 250 FOR IP): Performed by: STUDENT IN AN ORGANIZED HEALTH CARE EDUCATION/TRAINING PROGRAM

## 2018-09-22 PROCEDURE — 82570 ASSAY OF URINE CREATININE: CPT

## 2018-09-22 PROCEDURE — 2580000003 HC RX 258: Performed by: STUDENT IN AN ORGANIZED HEALTH CARE EDUCATION/TRAINING PROGRAM

## 2018-09-22 PROCEDURE — 85027 COMPLETE CBC AUTOMATED: CPT

## 2018-09-22 PROCEDURE — 7200000001 HC VAGINAL DELIVERY

## 2018-09-22 PROCEDURE — 84156 ASSAY OF PROTEIN URINE: CPT

## 2018-09-22 PROCEDURE — 80053 COMPREHEN METABOLIC PANEL: CPT

## 2018-09-22 RX ORDER — HYDROCODONE BITARTRATE AND ACETAMINOPHEN 5; 325 MG/1; MG/1
1 TABLET ORAL EVERY 6 HOURS PRN
Status: DISCONTINUED | OUTPATIENT
Start: 2018-09-22 | End: 2018-09-23 | Stop reason: HOSPADM

## 2018-09-22 RX ADMIN — Medication 10 ML: at 07:44

## 2018-09-22 RX ADMIN — DOCUSATE SODIUM 100 MG: 100 CAPSULE, LIQUID FILLED ORAL at 07:44

## 2018-09-22 RX ADMIN — HYDROCODONE BITARTRATE AND ACETAMINOPHEN 1 TABLET: 5; 325 TABLET ORAL at 14:25

## 2018-09-22 RX ADMIN — BENZOCAINE AND LEVOMENTHOL: 200; 5 SPRAY TOPICAL at 14:25

## 2018-09-22 RX ADMIN — HYDROCODONE BITARTRATE AND ACETAMINOPHEN 1 TABLET: 5; 325 TABLET ORAL at 07:44

## 2018-09-22 RX ADMIN — HYDROCODONE BITARTRATE AND ACETAMINOPHEN 1 TABLET: 5; 325 TABLET ORAL at 20:38

## 2018-09-22 RX ADMIN — DOCUSATE SODIUM 100 MG: 100 CAPSULE, LIQUID FILLED ORAL at 20:38

## 2018-09-22 ASSESSMENT — PAIN SCALES - GENERAL
PAINLEVEL_OUTOF10: 5
PAINLEVEL_OUTOF10: 7
PAINLEVEL_OUTOF10: 7

## 2018-09-22 NOTE — PLAN OF CARE
Problem: Anxiety:  Goal: Level of anxiety will decrease  Level of anxiety will decrease   Outcome: Met This Shift      Problem: Labor Process - Prolonged:  Goal: Labor progression, first stage, within specified pattern  Labor progression, first stage, within specified pattern   Outcome: Met This Shift      Problem: Pain:  Goal: Pain level will decrease  Pain level will decrease   Outcome: Met This Shift

## 2018-09-22 NOTE — PLAN OF CARE
Problem: Discharge Planning:  Goal: Discharged to appropriate level of care  Discharged to appropriate level of care   Outcome: Ongoing      Problem: Constipation:  Goal: Bowel elimination is within specified parameters  Bowel elimination is within specified parameters   Outcome: Ongoing      Problem: Fluid Volume - Imbalance:  Goal: Absence of imbalanced fluid volume signs and symptoms  Absence of imbalanced fluid volume signs and symptoms   Outcome: Ongoing    Goal: Absence of postpartum hemorrhage signs and symptoms  Absence of postpartum hemorrhage signs and symptoms   Outcome: Ongoing      Problem: Infection - Risk of, Puerperal Infection:  Goal: Will show no infection signs and symptoms  Will show no infection signs and symptoms   Outcome: Ongoing      Problem: Mood - Altered:  Goal: Mood stable  Mood stable   Outcome: Ongoing      Problem: Pain - Acute:  Goal: Pain level will decrease  Pain level will decrease   Outcome: Ongoing

## 2018-09-23 VITALS
DIASTOLIC BLOOD PRESSURE: 76 MMHG | BODY MASS INDEX: 33.66 KG/M2 | WEIGHT: 190 LBS | RESPIRATION RATE: 16 BRPM | HEIGHT: 63 IN | OXYGEN SATURATION: 99 % | SYSTOLIC BLOOD PRESSURE: 140 MMHG | TEMPERATURE: 97.3 F | HEART RATE: 79 BPM

## 2018-09-23 PROCEDURE — 6370000000 HC RX 637 (ALT 250 FOR IP): Performed by: STUDENT IN AN ORGANIZED HEALTH CARE EDUCATION/TRAINING PROGRAM

## 2018-09-23 RX ORDER — PSEUDOEPHEDRINE HCL 30 MG
100 TABLET ORAL 2 TIMES DAILY
Qty: 60 CAPSULE | Refills: 0 | Status: SHIPPED | OUTPATIENT
Start: 2018-09-23 | End: 2019-10-10

## 2018-09-23 RX ORDER — IBUPROFEN 800 MG/1
800 TABLET ORAL EVERY 6 HOURS PRN
Qty: 30 TABLET | Refills: 0 | Status: SHIPPED | OUTPATIENT
Start: 2018-09-23 | End: 2020-07-01 | Stop reason: CLARIF

## 2018-09-23 RX ADMIN — DOCUSATE SODIUM 100 MG: 100 CAPSULE, LIQUID FILLED ORAL at 08:26

## 2018-09-23 RX ADMIN — HYDROCODONE BITARTRATE AND ACETAMINOPHEN 1 TABLET: 5; 325 TABLET ORAL at 08:26

## 2018-09-23 RX ADMIN — HYDROCODONE BITARTRATE AND ACETAMINOPHEN 1 TABLET: 5; 325 TABLET ORAL at 02:17

## 2018-09-23 ASSESSMENT — PAIN SCALES - GENERAL
PAINLEVEL_OUTOF10: 7
PAINLEVEL_OUTOF10: 6

## 2018-09-23 NOTE — FLOWSHEET NOTE
Discharge teaching and instructions for diagnosis/procedure of vaginal delivery completed with patient using teachback method. AVS reviewed. Printed prescriptions given to patient. Patient voiced understanding regarding prescriptions, follow up appointments, and care of self at home.

## 2018-09-26 LAB — SURGICAL PATHOLOGY REPORT: NORMAL

## 2018-09-28 ENCOUNTER — POSTPARTUM VISIT (OUTPATIENT)
Dept: OBGYN CLINIC | Age: 28
End: 2018-09-28

## 2018-09-28 VITALS
HEART RATE: 115 BPM | TEMPERATURE: 98.2 F | SYSTOLIC BLOOD PRESSURE: 131 MMHG | DIASTOLIC BLOOD PRESSURE: 94 MMHG | HEIGHT: 63 IN | BODY MASS INDEX: 30.65 KG/M2 | WEIGHT: 173 LBS

## 2018-09-28 DIAGNOSIS — M53.3 PAIN IN THE COCCYX: ICD-10-CM

## 2018-09-28 PROCEDURE — 99024 POSTOP FOLLOW-UP VISIT: CPT | Performed by: CLINICAL NURSE SPECIALIST

## 2018-09-28 ASSESSMENT — ENCOUNTER SYMPTOMS
RESPIRATORY NEGATIVE: 1
GASTROINTESTINAL NEGATIVE: 1
ALLERGIC/IMMUNOLOGIC NEGATIVE: 1
EYES NEGATIVE: 1

## 2018-09-28 NOTE — PROGRESS NOTES
Postpartum Visit: Patient is here today for postpartum vaginal delivery. I have fully reviewed the prenatal and intrapartum course. She is 1 weeks postpartum. Patient is bottle feeding. Her bleeding is moderate. Patient's pain is 4 out of 10 on the pain scale. Patient is not sexually active. Current contraception method is none. Postpartum depression screening questionnaire provided to patient and is positive.
reviewed. Assessment:       Diagnosis Orders   1. Postpartum care following vaginal delivery     2. Pain in the coccyx             Plan:    patient encouraged to slow way down on home activities and if bleeding remains heavier call the office and patient verbalized understanding  Patient encouraged to fill motrin prescription from hospital to help with coccyx pain and patient verbalized understanding  Discussed with patient forms of birth control and patient is strongly considering depo.      Follow up next week for BP reyes Suarez, APRN - CNP

## 2018-10-04 ENCOUNTER — POSTPARTUM VISIT (OUTPATIENT)
Dept: OBGYN CLINIC | Age: 28
End: 2018-10-04

## 2018-10-04 VITALS
WEIGHT: 173.2 LBS | SYSTOLIC BLOOD PRESSURE: 134 MMHG | BODY MASS INDEX: 30.68 KG/M2 | HEART RATE: 105 BPM | DIASTOLIC BLOOD PRESSURE: 82 MMHG

## 2018-10-04 DIAGNOSIS — Z30.013 INITIATION OF DEPO PROVERA: Primary | ICD-10-CM

## 2018-10-04 PROCEDURE — 1036F TOBACCO NON-USER: CPT | Performed by: SPECIALIST

## 2018-10-04 PROCEDURE — G8427 DOCREV CUR MEDS BY ELIG CLIN: HCPCS | Performed by: SPECIALIST

## 2018-10-04 PROCEDURE — G8417 CALC BMI ABV UP PARAM F/U: HCPCS | Performed by: SPECIALIST

## 2018-10-04 PROCEDURE — G8484 FLU IMMUNIZE NO ADMIN: HCPCS | Performed by: SPECIALIST

## 2018-10-04 PROCEDURE — 99024 POSTOP FOLLOW-UP VISIT: CPT | Performed by: SPECIALIST

## 2018-10-04 PROCEDURE — 1111F DSCHRG MED/CURRENT MED MERGE: CPT | Performed by: SPECIALIST

## 2018-10-04 RX ORDER — MEDROXYPROGESTERONE ACETATE 150 MG/ML
150 INJECTION, SUSPENSION INTRAMUSCULAR
Qty: 1 ML | Refills: 3 | Status: SHIPPED | OUTPATIENT
Start: 2018-10-04 | End: 2018-10-26 | Stop reason: SDUPTHER

## 2018-10-04 ASSESSMENT — ENCOUNTER SYMPTOMS
APNEA: 0
ABDOMINAL PAIN: 0
EYE PAIN: 0
VOMITING: 0
CONSTIPATION: 0
DIARRHEA: 0
ABDOMINAL DISTENTION: 0
COUGH: 0
NAUSEA: 0

## 2018-10-04 NOTE — PROGRESS NOTES
not sexually active. Current contraception method is abstinence. Postpartum depression screening questionnaire provided to patient and is negative with a score of zero on the EPDS. She denies nausea, vomiting and fever. Review of Systems   Constitutional: Negative for activity change, appetite change and fever. HENT: Negative for ear discharge and ear pain. Eyes: Negative for pain and visual disturbance. Respiratory: Negative for apnea and cough. Cardiovascular: Negative for chest pain, palpitations and leg swelling. Gastrointestinal: Negative for abdominal distention, abdominal pain, constipation, diarrhea, nausea and vomiting. Endocrine: Negative. Genitourinary: Negative for difficulty urinating, dysuria, menstrual problem and pelvic pain. Musculoskeletal: Negative for neck pain and neck stiffness. Skin: Negative. Neurological: Negative for light-headedness and numbness. Hematological: Negative. Does not bruise/bleed easily. Objective:   Physical Exam   Constitutional: She is oriented to person, place, and time. Vital signs are normal. She appears well-developed and well-nourished. HENT:   Head: Normocephalic and atraumatic. Neck: Normal range of motion. Neck supple. No thyromegaly present. Cardiovascular: Normal rate and regular rhythm. Pulmonary/Chest: Effort normal and breath sounds normal. She has no wheezes. Abdominal: Soft. Bowel sounds are normal. She exhibits no distension and no mass. There is no tenderness. There is no guarding. Musculoskeletal: Normal range of motion. Neurological: She is alert and oriented to person, place, and time. Skin: Skin is dry. Psychiatric: She has a normal mood and affect. Her behavior is normal. Thought content normal.   Nursing note and vitals reviewed. Assessment:      Patient 2 weeks post vaginal delivery, requesting birth control. Birth control options were discussed and patient requests Depo Provera.

## 2018-10-26 ENCOUNTER — POSTPARTUM VISIT (OUTPATIENT)
Dept: OBGYN CLINIC | Age: 28
End: 2018-10-26
Payer: COMMERCIAL

## 2018-10-26 VITALS
BODY MASS INDEX: 29.77 KG/M2 | HEART RATE: 103 BPM | TEMPERATURE: 98.1 F | WEIGHT: 168 LBS | SYSTOLIC BLOOD PRESSURE: 122 MMHG | HEIGHT: 63 IN | RESPIRATION RATE: 18 BRPM | DIASTOLIC BLOOD PRESSURE: 81 MMHG

## 2018-10-26 DIAGNOSIS — N94.6 DYSMENORRHEA: Primary | ICD-10-CM

## 2018-10-26 DIAGNOSIS — Z32.02 URINE PREGNANCY TEST NEGATIVE: ICD-10-CM

## 2018-10-26 LAB
CONTROL: NORMAL
PREGNANCY TEST URINE, POC: NEGATIVE

## 2018-10-26 PROCEDURE — 0503F POSTPARTUM CARE VISIT: CPT | Performed by: SPECIALIST

## 2018-10-26 PROCEDURE — 96372 THER/PROPH/DIAG INJ SC/IM: CPT | Performed by: SPECIALIST

## 2018-10-26 PROCEDURE — 81025 URINE PREGNANCY TEST: CPT | Performed by: SPECIALIST

## 2018-10-26 RX ORDER — CYCLOSPORINE 0.5 MG/ML
EMULSION OPHTHALMIC
Refills: 11 | COMMUNITY
Start: 2018-10-09 | End: 2019-10-10

## 2018-10-26 RX ORDER — MEDROXYPROGESTERONE ACETATE 150 MG/ML
150 INJECTION, SUSPENSION INTRAMUSCULAR ONCE
Status: COMPLETED | OUTPATIENT
Start: 2018-10-26 | End: 2018-10-26

## 2018-10-26 RX ADMIN — MEDROXYPROGESTERONE ACETATE 150 MG: 150 INJECTION, SUSPENSION INTRAMUSCULAR at 12:01

## 2018-10-26 ASSESSMENT — PATIENT HEALTH QUESTIONNAIRE - PHQ9
2. FEELING DOWN, DEPRESSED OR HOPELESS: 0
SUM OF ALL RESPONSES TO PHQ QUESTIONS 1-9: 0
SUM OF ALL RESPONSES TO PHQ QUESTIONS 1-9: 0
1. LITTLE INTEREST OR PLEASURE IN DOING THINGS: 0
SUM OF ALL RESPONSES TO PHQ9 QUESTIONS 1 & 2: 0

## 2018-10-26 ASSESSMENT — ENCOUNTER SYMPTOMS
DIARRHEA: 0
COUGH: 0
ABDOMINAL DISTENTION: 0
ABDOMINAL PAIN: 0
EYE PAIN: 0
CONSTIPATION: 0
VOMITING: 0
NAUSEA: 0
APNEA: 0

## 2018-10-26 NOTE — LETTER
[address] [place] [address] GYN  [facility] [address] [address]  Phone: [phone]  Fax: [phone]    Collette Mackie, MD        October 26, 2018     Patient: Carlotta Wolf   YOB: 1990   Date of Visit: 10/26/2018       To Whom it May Concern:    Orvil Collet was seen in my clinic on 10/26/2018. She may return to work on 10/27/18. If you have any questions or concerns, please don't hesitate to call.     Sincerely,         Collette Mackie, MD

## 2018-10-26 NOTE — PROGRESS NOTES
Skin: Skin is dry. Psychiatric: She has a normal mood and affect. Her behavior is normal. Thought content normal.   Nursing note and vitals reviewed. Assessment:      Patient 6 weeks postpartum with normal exam.  Patient advised to  Depo Provera and return for injection. Patient advised she may return to normal activities. Plan:      Appointment for annual exam.    Sunny Velasquez am scribing for, and in the presence of Dr. Scott Brand. Electronically signed by: Do Ro 10/26/18 10:49 AM       I agree to the above documentation placed by my scribe Do Ro. I reviewed the scribe's note and agree with the documented findings and plan of care. Any areas of disagreement are noted on the chart. I have personally evaluated this patient. Additional findings are as noted. I agree with the chief complaint, past medical history, past surgical history, allergies, medications, social and family history as documented unless otherwise noted below.      Electronically signed by Scott Brand MD on 10/27/2018 at 3:35 PM

## 2019-04-26 ENCOUNTER — HOSPITAL ENCOUNTER (EMERGENCY)
Age: 29
Discharge: HOME | End: 2019-04-26
Attending: EMERGENCY MEDICINE
Payer: COMMERCIAL

## 2019-04-26 VITALS
RESPIRATION RATE: 15 BRPM | BODY MASS INDEX: 26.22 KG/M2 | HEART RATE: 79 BPM | WEIGHT: 148 LBS | HEIGHT: 63 IN | SYSTOLIC BLOOD PRESSURE: 125 MMHG | TEMPERATURE: 98.2 F | OXYGEN SATURATION: 100 % | DIASTOLIC BLOOD PRESSURE: 73 MMHG

## 2019-04-26 DIAGNOSIS — R10.13 ABDOMINAL PAIN, EPIGASTRIC: Primary | ICD-10-CM

## 2019-04-26 LAB
ABSOLUTE EOS #: 0.2 K/UL (ref 0–0.4)
ABSOLUTE IMMATURE GRANULOCYTE: ABNORMAL K/UL (ref 0–0.3)
ABSOLUTE LYMPH #: 3.3 K/UL (ref 1–4.8)
ABSOLUTE MONO #: 0.4 K/UL (ref 0.1–1.3)
ALBUMIN SERPL-MCNC: 4.3 G/DL (ref 3.5–5.2)
ALBUMIN/GLOBULIN RATIO: ABNORMAL (ref 1–2.5)
ALP BLD-CCNC: 69 U/L (ref 35–104)
ALT SERPL-CCNC: 12 U/L (ref 5–33)
ANION GAP SERPL CALCULATED.3IONS-SCNC: 12 MMOL/L (ref 9–17)
AST SERPL-CCNC: 16 U/L
BASOPHILS # BLD: 1 % (ref 0–2)
BASOPHILS ABSOLUTE: 0.1 K/UL (ref 0–0.2)
BILIRUB SERPL-MCNC: <0.15 MG/DL (ref 0.3–1.2)
BUN BLDV-MCNC: 13 MG/DL (ref 6–20)
BUN/CREAT BLD: ABNORMAL (ref 9–20)
CALCIUM SERPL-MCNC: 10.1 MG/DL (ref 8.6–10.4)
CHLORIDE BLD-SCNC: 107 MMOL/L (ref 98–107)
CO2: 22 MMOL/L (ref 20–31)
CREAT SERPL-MCNC: 0.55 MG/DL (ref 0.5–0.9)
DIFFERENTIAL TYPE: ABNORMAL
EOSINOPHILS RELATIVE PERCENT: 3 % (ref 0–4)
GFR AFRICAN AMERICAN: >60 ML/MIN
GFR NON-AFRICAN AMERICAN: >60 ML/MIN
GFR SERPL CREATININE-BSD FRML MDRD: ABNORMAL ML/MIN/{1.73_M2}
GFR SERPL CREATININE-BSD FRML MDRD: ABNORMAL ML/MIN/{1.73_M2}
GLUCOSE BLD-MCNC: 92 MG/DL (ref 70–99)
HCG QUALITATIVE: NEGATIVE
HCT VFR BLD CALC: 37.4 % (ref 36–46)
HEMOGLOBIN: 13 G/DL (ref 12–16)
IMMATURE GRANULOCYTES: ABNORMAL %
LIPASE: 36 U/L (ref 13–60)
LYMPHOCYTES # BLD: 43 % (ref 24–44)
MCH RBC QN AUTO: 33.1 PG (ref 26–34)
MCHC RBC AUTO-ENTMCNC: 34.7 G/DL (ref 31–37)
MCV RBC AUTO: 95.4 FL (ref 80–100)
MONOCYTES # BLD: 6 % (ref 1–7)
NRBC AUTOMATED: ABNORMAL PER 100 WBC
PDW BLD-RTO: 14 % (ref 11.5–14.9)
PLATELET # BLD: 227 K/UL (ref 150–450)
PLATELET ESTIMATE: ABNORMAL
PMV BLD AUTO: 9.2 FL (ref 6–12)
POTASSIUM SERPL-SCNC: 4.1 MMOL/L (ref 3.7–5.3)
RBC # BLD: 3.92 M/UL (ref 4–5.2)
RBC # BLD: ABNORMAL 10*6/UL
SEG NEUTROPHILS: 47 % (ref 36–66)
SEGMENTED NEUTROPHILS ABSOLUTE COUNT: 3.7 K/UL (ref 1.3–9.1)
SODIUM BLD-SCNC: 141 MMOL/L (ref 135–144)
TOTAL PROTEIN: 6.9 G/DL (ref 6.4–8.3)
WBC # BLD: 7.8 K/UL (ref 3.5–11)
WBC # BLD: ABNORMAL 10*3/UL

## 2019-04-26 PROCEDURE — 36415 COLL VENOUS BLD VENIPUNCTURE: CPT

## 2019-04-26 PROCEDURE — 83690 ASSAY OF LIPASE: CPT

## 2019-04-26 PROCEDURE — 6370000000 HC RX 637 (ALT 250 FOR IP): Performed by: STUDENT IN AN ORGANIZED HEALTH CARE EDUCATION/TRAINING PROGRAM

## 2019-04-26 PROCEDURE — 80053 COMPREHEN METABOLIC PANEL: CPT

## 2019-04-26 PROCEDURE — 85025 COMPLETE CBC W/AUTO DIFF WBC: CPT

## 2019-04-26 PROCEDURE — 99284 EMERGENCY DEPT VISIT MOD MDM: CPT

## 2019-04-26 PROCEDURE — 84703 CHORIONIC GONADOTROPIN ASSAY: CPT

## 2019-04-26 RX ORDER — MAGNESIUM HYDROXIDE/ALUMINUM HYDROXICE/SIMETHICONE 120; 1200; 1200 MG/30ML; MG/30ML; MG/30ML
30 SUSPENSION ORAL ONCE
Status: COMPLETED | OUTPATIENT
Start: 2019-04-26 | End: 2019-04-26

## 2019-04-26 RX ORDER — PANTOPRAZOLE SODIUM 40 MG/1
40 TABLET, DELAYED RELEASE ORAL
Qty: 60 TABLET | Refills: 0 | Status: SHIPPED | OUTPATIENT
Start: 2019-04-26 | End: 2019-10-10

## 2019-04-26 RX ORDER — ALUMINA, MAGNESIA, AND SIMETHICONE 2400; 2400; 240 MG/30ML; MG/30ML; MG/30ML
30 SUSPENSION ORAL 2 TIMES DAILY PRN
Qty: 1 BOTTLE | Refills: 0 | Status: SHIPPED | OUTPATIENT
Start: 2019-04-26 | End: 2019-10-10

## 2019-04-26 RX ADMIN — LIDOCAINE HYDROCHLORIDE 15 ML: 20 SOLUTION ORAL; TOPICAL at 20:07

## 2019-04-26 RX ADMIN — ALUMINUM HYDROXIDE, MAGNESIUM HYDROXIDE, AND SIMETHICONE 30 ML: 200; 200; 20 SUSPENSION ORAL at 20:07

## 2019-04-26 ASSESSMENT — ENCOUNTER SYMPTOMS
NAUSEA: 0
SHORTNESS OF BREATH: 0
BLOOD IN STOOL: 0
CONSTIPATION: 0
DIARRHEA: 0
ABDOMINAL DISTENTION: 0
ABDOMINAL PAIN: 1
VOMITING: 0
SORE THROAT: 0
COUGH: 0

## 2019-04-26 ASSESSMENT — PAIN DESCRIPTION - LOCATION: LOCATION: ABDOMEN

## 2019-04-26 ASSESSMENT — PAIN DESCRIPTION - ORIENTATION: ORIENTATION: MID

## 2019-04-26 ASSESSMENT — PAIN DESCRIPTION - DESCRIPTORS: DESCRIPTORS: STABBING

## 2019-04-26 ASSESSMENT — PAIN SCALES - GENERAL: PAINLEVEL_OUTOF10: 5

## 2019-04-26 ASSESSMENT — PAIN DESCRIPTION - PAIN TYPE: TYPE: ACUTE PAIN

## 2019-04-26 NOTE — ED PROVIDER NOTES
[address] ED  Emergency Department Encounter  EmergencyMedicine Resident     Pt Name:Nita Ardon  MRN: [id number]  [place] 1990  Date of evaluation: 4/26/19  PCP:  No primary care provider on file. CHIEF COMPLAINT       Chief Complaint   Patient presents with    Abdominal Pain     onset today- midepi stsabbing- worse with eating/drinking       HISTORY OF PRESENT ILLNESS  (Location/Symptom, Timing/Onset, Context/Setting, Quality, Duration, Modifying Factors, Severity.)      Rene Wheat is a 34 y.o. female who presents with epigastric pain morning. Patient reports a constant, sharp pain made worse by eating or drinking, since this morning. No nausea, vomiting, diarrhea, constipation, or blood in her stool. No chest pain, shortness of breath, diaphoresis, or lightheadedness. Patient has a history of PUD as well as GERD; she has not taken any antacids, PPI, or H2 blocker in some time. She did not try any medications for her symptoms today. PAST MEDICAL / SURGICAL / SOCIAL / FAMILY HISTORY      has a past medical history of Abnormal Pap smear, Anemia, Complication of anesthesia, Deviated septum, Endometriosis, GERD (gastroesophageal reflux disease), Postpartum depression, and Spondylisthesis. has a past surgical history that includes Dilation and curettage of uterus; Cholecystectomy; Colonoscopy; Upper gastrointestinal endoscopy; Colposcopy; hernia repair (2012); and pr cerclage cervix w preg,vag apprch (N/A, 6/1/2018).     Social History     Socioeconomic History    Marital status: Single     Spouse name: Not on file    Number of children: Not on file    Years of education: Not on file    Highest education level: Not on file   Occupational History    Not on file   Social Needs    Financial resource strain: Not on file    Food insecurity:     Worry: Not on file     Inability: Not on file    Transportation needs:     Medical: Not on file     Non-medical: Not on file Fumarate-FA (PNV PRENATAL PLUS MULTIVITAMIN) 27-1 MG TABS Take 1 tablet by mouth daily 1/23/18 9/9/18  Emery Hung, APRN - CNP       REVIEW OF SYSTEMS    (2-9 systems for level 4, 10 or more for level 5)      Review of Systems   Constitutional: Negative for chills, fatigue and fever. HENT: Negative for congestion and sore throat. Eyes: Negative for visual disturbance. Respiratory: Negative for cough and shortness of breath. Cardiovascular: Negative for chest pain. Gastrointestinal: Positive for abdominal pain. Negative for abdominal distention, blood in stool, constipation, diarrhea, nausea and vomiting. Genitourinary: Negative for dysuria, flank pain and hematuria. Musculoskeletal: Negative for arthralgias, gait problem, neck pain and neck stiffness. Skin: Negative for rash and wound. Neurological: Negative for syncope, weakness, light-headedness, numbness and headaches. PHYSICAL EXAM   (up to 7 for level 4, 8 or more for level 5)      INITIAL VITALS:   /73   Pulse 79   Temp 98.2 °F (36.8 °C) (Oral)   Resp 15   Ht 5' 3\" (1.6 m)   Wt 148 lb (67.1 kg)   LMP 04/12/2019   SpO2 100%   BMI 26.22 kg/m²     Physical Exam   Gen. Appearance: patient appears well, nondistressed. HEENT: head atraumatic, normocephalic. Pupils equal and reactive to light. Oropharynx clear and moist.  Neck: Supple, normal range of motion. No lymphadenopathy. Pulmonary: Lungs clear to auscultation bilaterally. Equal air entry right left. Cardiovascular:  Heart sounds normal, no murmurs. Peripheral pulses strong, regular, equal.   Abdomen: Soft, nondistended. Tender to palpation in the epigastric region; no rebound tenderness, guarding, or rigidity. No Friend's sign. No tenderness at McBurney's point. No Rovsing sign. Bowel sounds normal. No palpable masses. Neurology: GCS 15. Oriented to person place and time. Normal tone and power in all 4 extremities. No sensory deficits.     Skin: Warm, Absolute Mono # 0.40 0.1 - 1.3 k/uL    Absolute Eos # 0.20 0.0 - 0.4 k/uL    Basophils # 0.10 0.0 - 0.2 k/uL    Absolute Immature Granulocyte NOT REPORTED 0.00 - 0.30 k/uL    WBC Morphology NOT REPORTED     RBC Morphology NOT REPORTED     Platelet Estimate NOT REPORTED    Comprehensive Metabolic Panel   Result Value Ref Range    Glucose 92 70 - 99 mg/dL    BUN 13 6 - 20 mg/dL    CREATININE 0.55 0.50 - 0.90 mg/dL    Bun/Cre Ratio NOT REPORTED 9 - 20    Calcium 10.1 8.6 - 10.4 mg/dL    Sodium 141 135 - 144 mmol/L    Potassium 4.1 3.7 - 5.3 mmol/L    Chloride 107 98 - 107 mmol/L    CO2 22 20 - 31 mmol/L    Anion Gap 12 9 - 17 mmol/L    Alkaline Phosphatase 69 35 - 104 U/L    ALT 12 5 - 33 U/L    AST 16 <32 U/L    Total Bilirubin <0.15 (L) 0.3 - 1.2 mg/dL    Total Protein 6.9 6.4 - 8.3 g/dL    Alb 4.3 3.5 - 5.2 g/dL    Albumin/Globulin Ratio NOT REPORTED 1.0 - 2.5    GFR Non-African American >60 >60 mL/min    GFR African American >60 >60 mL/min    GFR Comment          GFR Staging NOT REPORTED    Lipase   Result Value Ref Range    Lipase 36 13 - 60 U/L   HCG Qualitative, Serum   Result Value Ref Range    hCG Qual NEGATIVE NEGATIVE       IMPRESSION: 34year old patient presents with epigastric pain made worse by eating and drinking. Patient is afebrile, hemodynamically stable, and in no acute distress. Appropriately interactive and cooperative with interview and examination. Normal respiratory effort, lungs clear bilaterally, heart sounds normal, neurologically normal.  Tender to palpation in the epigastric region. Absolutely no findings of surgical abdomen. Patient is status post cholecystectomy years ago. No urinary symptoms or any pelvic symptoms to suggest  pathology. No chest pain, shortness of breath, nausea, diaphoresis, or any other symptoms to suggest ACS. Patient's presentation does not appear insistent with pancreatitis. No pulsatile mass and no elements of history to suggest aortic pathology. Medicine Resident    (Please note that portions of thisnote were completed with a voice recognition program.  Efforts were made to edit the dictations but occasionally words are mis-transcribed.)        Neva Bender MD  04/27/19 0139

## 2019-04-26 NOTE — ED PROVIDER NOTES
[address] ED  eMERGENCY dEPARTMENT eNCOUnter   Attending Attestation     Pt Name: Willie Mann  MRN: [id number]  [place] 1990  Date of evaluation: 4/26/19       Willie Mann is a 34 y.o. female who presents with Abdominal Pain (onset today- midepi stsabbing- worse with eating/drinking)      History:   Patient is having pain with swallowing in her epigastric area. Patient has a history of GERD. Patient is not currently taking any antacids. Exam: Vitals:   Vitals:    04/26/19 1904   BP: 125/73   Pulse: 79   Resp: 15   Temp: 98.2 °F (36.8 °C)   TempSrc: Oral   SpO2: 100%   Weight: 148 lb (67.1 kg)   Height: 5' 3\" (1.6 m)     Abdomen is soft nondistended with minimal tenderness to palpation in the epigastrium. No peritoneal signs. I performed a history and physical examination of the patient and discussed management with the resident. I reviewed the residents note and agree with the documented findings and plan of care. Any areas of disagreement are noted on the chart. I was personally present for the key portions of any procedures. I have documented in the chart those procedures where I was not present during the key portions. I have personally reviewed all images and agree with the resident's interpretation. I have reviewed the emergency nurses triage note. I agree with the chief complaint, past medical history, past surgical history, allergies, medications, social and family history as documented unless otherwise noted below. Documentation of the HPI, Physical Exam and Medical Decision Making performed by medical students or scribes is based on my personal performance of the HPI, PE and MDM. I personally evaluated and examined the patient in conjunction with the APC and agree with the assessment, treatment plan, and disposition of the patient as recorded by the APC. Additional findings are as noted.     Valorie Villa MD  Attending Emergency  Physician              Rhonda Ramos, MD  04/26/19 1958

## 2019-07-29 ENCOUNTER — OFFICE VISIT (OUTPATIENT)
Dept: OBGYN CLINIC | Age: 29
End: 2019-07-29
Payer: COMMERCIAL

## 2019-07-29 VITALS
WEIGHT: 129 LBS | HEIGHT: 64 IN | SYSTOLIC BLOOD PRESSURE: 112 MMHG | DIASTOLIC BLOOD PRESSURE: 68 MMHG | HEART RATE: 98 BPM | BODY MASS INDEX: 22.02 KG/M2

## 2019-07-29 DIAGNOSIS — N92.6 LATE MENSES: Primary | ICD-10-CM

## 2019-07-29 DIAGNOSIS — Z32.02 NEGATIVE PREGNANCY TEST: ICD-10-CM

## 2019-07-29 LAB
CONTROL: NORMAL
PREGNANCY TEST URINE, POC: NORMAL

## 2019-07-29 PROCEDURE — 81025 URINE PREGNANCY TEST: CPT | Performed by: SPECIALIST

## 2019-07-29 PROCEDURE — 99212 OFFICE O/P EST SF 10 MIN: CPT | Performed by: SPECIALIST

## 2019-07-29 ASSESSMENT — ENCOUNTER SYMPTOMS
APNEA: 0
COUGH: 0
DIARRHEA: 0
ABDOMINAL DISTENTION: 0
NAUSEA: 0
ABDOMINAL PAIN: 0
EYE PAIN: 0
CONSTIPATION: 0
VOMITING: 0

## 2019-07-29 NOTE — PROGRESS NOTES
distension and no mass. There is no tenderness. There is no guarding. Musculoskeletal: Normal range of motion. Neurological: She is alert and oriented to person, place, and time. Skin: Skin is dry. Psychiatric: She has a normal mood and affect. Her behavior is normal. Thought content normal.   Nursing note and vitals reviewed. Assessment:      Patient with late menses. Pregnancy test in the office today is negative. Patient was advised to return in 2 weeks and pregnancy test will be repeated. If pregnancy test continues to be negative at that time, birth control options will be discussed. Plan:      Orders Placed This Encounter   Procedures    POCT urine pregnancy     Appointment in 2 weeks. Savannah Leyva am scribing for, and in the presence of Dr. Elio Khan. Electronically signed by: Renata Burch 7/29/19 10:53 AM       I agree to the above documentation placed by my scribe Renata Handler. I reviewed the scribe's note and agree with the documented findings and plan of care. Any areas of disagreement are noted on the chart. I have personally evaluated this patient. Additional findings are as noted. I agree with the chief complaint, past medical history, past surgical history, allergies, medications, social and family history as documented unless otherwise noted below.      Electronically signed by Elio Khan MD on 7/30/2019 at 2:57 AM

## 2019-10-10 ENCOUNTER — OFFICE VISIT (OUTPATIENT)
Dept: OBGYN CLINIC | Age: 29
End: 2019-10-10
Payer: COMMERCIAL

## 2019-10-10 VITALS
TEMPERATURE: 96.2 F | SYSTOLIC BLOOD PRESSURE: 103 MMHG | WEIGHT: 136.4 LBS | BODY MASS INDEX: 23.29 KG/M2 | DIASTOLIC BLOOD PRESSURE: 66 MMHG | RESPIRATION RATE: 16 BRPM | HEART RATE: 83 BPM | HEIGHT: 64 IN

## 2019-10-10 DIAGNOSIS — N92.6 IRREGULAR MENSES: ICD-10-CM

## 2019-10-10 DIAGNOSIS — Z82.49 FAMILY HISTORY OF BLOOD CLOTS: ICD-10-CM

## 2019-10-10 DIAGNOSIS — Z82.3: ICD-10-CM

## 2019-10-10 DIAGNOSIS — Z30.9 ENCOUNTER FOR CONTRACEPTIVE MANAGEMENT, UNSPECIFIED TYPE: Primary | ICD-10-CM

## 2019-10-10 DIAGNOSIS — F17.200 SMOKER: ICD-10-CM

## 2019-10-10 DIAGNOSIS — Z32.02 NEGATIVE PREGNANCY TEST: ICD-10-CM

## 2019-10-10 DIAGNOSIS — N94.6 DYSMENORRHEA: ICD-10-CM

## 2019-10-10 PROBLEM — N88.3 SHORT CERVIX: Status: RESOLVED | Noted: 2018-05-31 | Resolved: 2019-10-10

## 2019-10-10 PROBLEM — O98.819 CHLAMYDIA INFECTION AFFECTING PREGNANCY: Status: RESOLVED | Noted: 2018-08-13 | Resolved: 2019-10-10

## 2019-10-10 PROBLEM — A74.9 CHLAMYDIA INFECTION AFFECTING PREGNANCY: Status: RESOLVED | Noted: 2018-08-13 | Resolved: 2019-10-10

## 2019-10-10 PROBLEM — O09.90 HIGH RISK PREGNANCY, ANTEPARTUM: Status: RESOLVED | Noted: 2018-09-20 | Resolved: 2019-10-10

## 2019-10-10 PROBLEM — O13.9 GESTATIONAL HTN: Status: RESOLVED | Noted: 2018-09-09 | Resolved: 2019-10-10

## 2019-10-10 PROBLEM — Z3A.36 36 WEEKS GESTATION OF PREGNANCY: Status: RESOLVED | Noted: 2018-09-09 | Resolved: 2019-10-10

## 2019-10-10 PROBLEM — B95.1 POSITIVE GBS TEST: Status: RESOLVED | Noted: 2018-09-11 | Resolved: 2019-10-10

## 2019-10-10 LAB
CONTROL: NORMAL
PREGNANCY TEST URINE, POC: NEGATIVE

## 2019-10-10 PROCEDURE — 81025 URINE PREGNANCY TEST: CPT | Performed by: CLINICAL NURSE SPECIALIST

## 2019-10-10 PROCEDURE — 99213 OFFICE O/P EST LOW 20 MIN: CPT | Performed by: CLINICAL NURSE SPECIALIST

## 2019-10-10 ASSESSMENT — ENCOUNTER SYMPTOMS
GASTROINTESTINAL NEGATIVE: 1
RESPIRATORY NEGATIVE: 1
ALLERGIC/IMMUNOLOGIC NEGATIVE: 1
EYES NEGATIVE: 1

## 2019-10-19 ENCOUNTER — HOSPITAL ENCOUNTER (EMERGENCY)
Age: 29
Discharge: HOME | End: 2019-10-19
Attending: EMERGENCY MEDICINE
Payer: COMMERCIAL

## 2019-10-19 ENCOUNTER — APPOINTMENT (OUTPATIENT)
Dept: GENERAL RADIOLOGY | Age: 29
End: 2019-10-19
Payer: COMMERCIAL

## 2019-10-19 VITALS
OXYGEN SATURATION: 98 % | HEART RATE: 88 BPM | HEIGHT: 64 IN | BODY MASS INDEX: 23.22 KG/M2 | SYSTOLIC BLOOD PRESSURE: 123 MMHG | WEIGHT: 136 LBS | RESPIRATION RATE: 14 BRPM | DIASTOLIC BLOOD PRESSURE: 70 MMHG | TEMPERATURE: 98.3 F
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
## 2019-10-19 DIAGNOSIS — S39.012A STRAIN OF LUMBAR REGION, INITIAL ENCOUNTER: ICD-10-CM

## 2019-10-19 LAB
-: NORMAL
CHP ED QC CHECK: NORMAL
HCG, PREGNANCY URINE (POC): NEGATIVE
PREGNANCY TEST URINE, POC: NEGATIVE

## 2019-10-19 PROCEDURE — 81025 URINE PREGNANCY TEST: CPT

## 2019-10-19 PROCEDURE — 99284 EMERGENCY DEPT VISIT MOD MDM: CPT

## 2019-10-19 PROCEDURE — 96372 THER/PROPH/DIAG INJ SC/IM: CPT

## 2019-10-19 PROCEDURE — 6360000002 HC RX W HCPCS: Performed by: NURSE PRACTITIONER

## 2019-10-19 PROCEDURE — 72100 X-RAY EXAM L-S SPINE 2/3 VWS: CPT

## 2019-10-19 RX ORDER — CYCLOBENZAPRINE HCL 10 MG
10 TABLET ORAL 3 TIMES DAILY PRN
Qty: 15 TABLET | Refills: 0 | Status: SHIPPED | OUTPATIENT
Start: 2019-10-19 | End: 2020-07-01 | Stop reason: CLARIF

## 2019-10-19 RX ORDER — IBUPROFEN 800 MG/1
800 TABLET ORAL EVERY 8 HOURS PRN
Qty: 20 TABLET | Refills: 0 | Status: SHIPPED | OUTPATIENT
Start: 2019-10-19 | End: 2020-07-01 | Stop reason: CLARIF

## 2019-10-19 RX ORDER — KETOROLAC TROMETHAMINE 30 MG/ML
30 INJECTION, SOLUTION INTRAMUSCULAR; INTRAVENOUS ONCE
Status: COMPLETED | OUTPATIENT
Start: 2019-10-19 | End: 2019-10-19

## 2019-10-19 RX ORDER — TRAMADOL HYDROCHLORIDE 50 MG/1
50 TABLET ORAL EVERY 6 HOURS PRN
Qty: 10 TABLET | Refills: 0 | Status: SHIPPED | OUTPATIENT
Start: 2019-10-19 | End: 2019-10-22

## 2019-10-19 RX ADMIN — KETOROLAC TROMETHAMINE 30 MG: 30 INJECTION, SOLUTION INTRAMUSCULAR; INTRAVENOUS at 21:08

## 2019-10-19 ASSESSMENT — PAIN DESCRIPTION - PROGRESSION: CLINICAL_PROGRESSION: GRADUALLY WORSENING

## 2019-10-19 ASSESSMENT — PAIN SCALES - GENERAL
PAINLEVEL_OUTOF10: 8
PAINLEVEL_OUTOF10: 8

## 2019-10-19 ASSESSMENT — PAIN DESCRIPTION - ONSET: ONSET: ON-GOING

## 2019-10-19 ASSESSMENT — PAIN DESCRIPTION - PAIN TYPE: TYPE: CHRONIC PAIN

## 2019-10-19 ASSESSMENT — PAIN DESCRIPTION - FREQUENCY: FREQUENCY: CONTINUOUS

## 2019-10-19 ASSESSMENT — ENCOUNTER SYMPTOMS
COUGH: 0
TROUBLE SWALLOWING: 0
SHORTNESS OF BREATH: 0
NAUSEA: 0
BACK PAIN: 1
VOMITING: 0

## 2019-10-19 ASSESSMENT — PAIN DESCRIPTION - DESCRIPTORS: DESCRIPTORS: ACHING;CONSTANT

## 2019-10-19 ASSESSMENT — PAIN DESCRIPTION - LOCATION: LOCATION: BACK

## 2019-10-19 ASSESSMENT — PAIN DESCRIPTION - ORIENTATION: ORIENTATION: LOWER

## 2019-11-25 DIAGNOSIS — O09.213 PREVIOUS PRETERM DELIVERY, ANTEPARTUM, THIRD TRIMESTER: ICD-10-CM

## 2019-11-25 DIAGNOSIS — O09.93 HIGH-RISK PREGNANCY IN THIRD TRIMESTER: ICD-10-CM

## 2019-11-25 DIAGNOSIS — Z3A.30 30 WEEKS GESTATION OF PREGNANCY: ICD-10-CM

## 2019-11-25 DIAGNOSIS — R05.9 COUGH: ICD-10-CM

## 2019-11-25 RX ORDER — FLUCONAZOLE 100 MG/1
100 TABLET ORAL DAILY
Qty: 7 TABLET | Refills: 0 | Status: SHIPPED | OUTPATIENT
Start: 2019-11-25 | End: 2019-12-02

## 2020-07-01 ENCOUNTER — OFFICE VISIT (OUTPATIENT)
Dept: OBGYN CLINIC | Age: 30
End: 2020-07-01
Payer: COMMERCIAL

## 2020-07-01 VITALS
BODY MASS INDEX: 26.09 KG/M2 | SYSTOLIC BLOOD PRESSURE: 135 MMHG | TEMPERATURE: 98.1 F | DIASTOLIC BLOOD PRESSURE: 83 MMHG | WEIGHT: 152 LBS | HEART RATE: 96 BPM

## 2020-07-01 LAB
CONTROL: NORMAL
PREGNANCY TEST URINE, POC: NEGATIVE

## 2020-07-01 PROCEDURE — 81025 URINE PREGNANCY TEST: CPT | Performed by: CLINICAL NURSE SPECIALIST

## 2020-07-01 PROCEDURE — 99213 OFFICE O/P EST LOW 20 MIN: CPT | Performed by: CLINICAL NURSE SPECIALIST

## 2020-07-01 RX ORDER — NORETHINDRONE ACETATE AND ETHINYL ESTRADIOL 1MG-20(21)
1 KIT ORAL DAILY
Qty: 3 PACKET | Refills: 1 | Status: SHIPPED | OUTPATIENT
Start: 2020-07-01 | End: 2020-11-18 | Stop reason: ALTCHOICE

## 2020-07-01 NOTE — PROGRESS NOTES
poctSubjective:      Patient ID:  Young Bravo is a 27 y.o. female who presents for   Chief Complaint   Patient presents with    Contraception     patient is here today to discuss birth control options       HPI     Patient is a 28 yo female who presents for birth control. Patient reports that she is having monthly menses but some months she has 2. Patient has history of endometriosis. Patient reports that her menses are so bad that she wants to call off of work. Patient reports using tampon and pad with menses, cycles will last 5-8 days, flow the first 4 days are very heavy then light till the end. Patient reports pelvic pain and cramping. Patient is requesting birth control pills. Review of Systems   Constitutional: Negative for chills and fever. HENT: Negative. Eyes: Negative. Respiratory: Negative. Cardiovascular: Negative. Gastrointestinal: Negative. Endocrine: Negative. Genitourinary: Positive for menstrual problem (heavy bleeding and severe cramping, sometimes it causes her to call off work) and pelvic pain (intermittently). Negative for dysuria and vaginal discharge. Musculoskeletal: Negative. Skin: Negative. Allergic/Immunologic: Negative. Neurological: Negative. Hematological: Negative. Psychiatric/Behavioral: Negative. /83 (Site: Right Upper Arm, Position: Sitting, Cuff Size: Medium Adult)   Pulse 96   Temp 98.1 °F (36.7 °C) (Oral)   Wt 152 lb (68.9 kg)   LMP 06/10/2020   BMI 26.09 kg/m²    Patient's last menstrual period was 06/10/2020. Objective:   Physical Exam  Vitals signs reviewed. Constitutional:       Appearance: She is well-developed. HENT:      Head: Normocephalic and atraumatic. Eyes:      Conjunctiva/sclera: Conjunctivae normal.   Neck:      Musculoskeletal: Normal range of motion and neck supple. Cardiovascular:      Rate and Rhythm: Normal rate and regular rhythm.    Pulmonary:      Effort: Pulmonary effort is

## 2020-11-18 ENCOUNTER — HOSPITAL ENCOUNTER (OUTPATIENT)
Age: 30
Setting detail: SPECIMEN
Discharge: HOME | End: 2020-11-18
Payer: COMMERCIAL

## 2020-11-18 ENCOUNTER — OFFICE VISIT (OUTPATIENT)
Dept: OBGYN CLINIC | Age: 30
End: 2020-11-18

## 2020-11-18 VITALS
DIASTOLIC BLOOD PRESSURE: 80 MMHG | BODY MASS INDEX: 27.18 KG/M2 | HEIGHT: 64 IN | WEIGHT: 159.2 LBS | SYSTOLIC BLOOD PRESSURE: 121 MMHG | TEMPERATURE: 98.1 F

## 2020-11-18 LAB
CONTROL: PRESENT
HCG QUANTITATIVE: 5936 IU/L
PREGNANCY TEST URINE, POC: POSITIVE

## 2020-11-18 PROCEDURE — 99213 OFFICE O/P EST LOW 20 MIN: CPT | Performed by: CLINICAL NURSE SPECIALIST

## 2020-11-18 PROCEDURE — 81025 URINE PREGNANCY TEST: CPT | Performed by: CLINICAL NURSE SPECIALIST

## 2020-11-18 RX ORDER — PROMETHAZINE HYDROCHLORIDE 25 MG/1
25 TABLET ORAL EVERY 8 HOURS PRN
Qty: 30 TABLET | Refills: 2 | Status: SHIPPED | OUTPATIENT
Start: 2020-11-18 | End: 2021-02-23

## 2020-11-18 RX ORDER — VITAMIN A ACETATE, .BETA.-CAROTENE, ASCORBIC ACID, CHOLECALCIFEROL, .ALPHA.-TOCOPHEROL ACETATE, DL-, THIAMINE MONONITRATE, RIBOFLAVIN, NIACINAMIDE, PYRIDOXINE HYDROCHLORIDE, FOLIC ACID, CYANOCOBALAMIN, CALCIUM CARBONATE, FERROUS FUMARATE, ZINC OXIDE, AND CUPRIC OXIDE 2000; 2000; 120; 400; 22; 1.84; 3; 20; 10; 1; 12; 200; 27; 25; 2 [IU]/1; [IU]/1; MG/1; [IU]/1; MG/1; MG/1; MG/1; MG/1; MG/1; MG/1; UG/1; MG/1; MG/1; MG/1; MG/1
1 TABLET ORAL DAILY
Qty: 30 TABLET | Refills: 11 | Status: SHIPPED | OUTPATIENT
Start: 2020-11-18 | End: 2021-08-25

## 2020-11-18 ASSESSMENT — PATIENT HEALTH QUESTIONNAIRE - PHQ9
SUM OF ALL RESPONSES TO PHQ QUESTIONS 1-9: 0
2. FEELING DOWN, DEPRESSED OR HOPELESS: 0
SUM OF ALL RESPONSES TO PHQ QUESTIONS 1-9: 0
SUM OF ALL RESPONSES TO PHQ QUESTIONS 1-9: 0
1. LITTLE INTEREST OR PLEASURE IN DOING THINGS: 0
SUM OF ALL RESPONSES TO PHQ9 QUESTIONS 1 & 2: 0

## 2020-11-18 NOTE — PROGRESS NOTES
Patient was in the office today for a CG. Draw per physician order using sterile technique.   Drawn from the right antecubital.

## 2020-11-18 NOTE — PROGRESS NOTES
DATE OF VISIT:  20  PATIENT NAME:  Vargas Or     YOB: 1990    SUBJECTIVE:    Chief Complaint:  Chief Complaint   Patient presents with    Amenorrhea     positive pregnancy        HPI:  Kayode Chery  is being seen today for missed menses. She reports a  positive pregnancy test on 20. This  is not a planned pregnancy. She is  accepting at this time. LMP: 10/9/20      Sure and definite: Yes     28 day cycle: No    She was not on a contraceptive at the time of conception. Estimated weeks gestation today : 5w 5d  Tentative KEVIN: 21    Relationship with FOB: involved    OBJECTIVE:    Vitals:    20 0947   BP: 121/80   Site: Left Upper Arm   Position: Sitting   Cuff Size: Medium Adult   Temp: 98.1 °F (36.7 °C)   TempSrc: Temporal   Weight: 159 lb 3.2 oz (72.2 kg)   Height: 5' 4\" (1.626 m)     Body mass index is 27.33 kg/m². Patient's last menstrual period was 10/09/2020 (exact date). Mother's ethnicity:    Father's ethnicity:      She is complaining today of:   Pain: No  Cramping: No  Bleeding or spotting: No    Nausea: Yes  Vomiting: No    Breast enlargement/tenderness: Yes  Fatigue: Yes  Frequency of urination: Yes    Previous high risk obstetrical history: yes  delivery @ 28 wks,   OB History    Para Term  AB Living   3 2 1 1 1 2   SAB TAB Ectopic Molar Multiple Live Births   1       0 2      # Outcome Date GA Lbr Chema/2nd Weight Sex Delivery Anes PTL Lv   3 Term 18 38w3d 00:48 / 00:43 7 lb 6.9 oz (3.37 kg) F Vag-Spont EPI N CASTILLO   2  10/08/11 28w0d  3 lb 9 oz (1.616 kg) M Vag-Spont EPI Y CASTILOL      Complications: Placenta previa, Placental abruption in third trimester   1 SAB  13w0d             Birth Comments: had d&c       ROS was done and is negative except as documented in HPI. History was reviewed as documented on Epic Navigator. ASSESSMENT:  Missed menses with + UCG  1. Missed period    2.  Positive pregnancy test    3. Pregnancy with inconclusive fetal viability, single or unspecified fetus    4. Nausea/vomiting in pregnancy        PLAN:  UCG was done and noted as positive  Prenatal vitamins were ordered: Yes  Ultrasound for dating and viability was ordered: Yes  1 hour glucola was ordered: Yes  She was instructed to call with any concerns or worsening of any symptoms  She will return for New OB intake visit    Patient was seen with total face to face time of 15 minutes. More than 50% of this visit was spent face to face coordinating plan of care and answering questions . She was also counseled on her preventative health maintenance recommendations and follow-up. Return for call in am for quant level if 5000 or above will schedule dating US.     Electronically signed by: Mahendra Helton CNP

## 2020-11-19 ENCOUNTER — TELEPHONE (OUTPATIENT)
Dept: OBGYN CLINIC | Age: 30
End: 2020-11-19

## 2020-11-19 NOTE — TELEPHONE ENCOUNTER
----- Message from GOSIA Thomas CNP sent at 11/19/2020  8:02 AM EST -----  She can be scheduled for US in 1 1/2 wks

## 2020-12-11 ENCOUNTER — TELEPHONE (OUTPATIENT)
Dept: OBGYN CLINIC | Age: 30
End: 2020-12-11

## 2020-12-11 NOTE — TELEPHONE ENCOUNTER
Pt called in stated that she has been having some spotting and cramping. After getting further information from pt she has only spotted when wiping and on Wed and thur 12/9 and 12/10 1x each day. Pt also complains of cramping but states its more in her back. Pt states that she called in for advice because she wasn't sure what to do. Pt also stated that she had spotting and cramping with her last pregnancy. Pt was advised though she has an hx of spotting and cramping during pregnancy with positive results she should still go to ED to be evaluated. Pt gave verbal understanding.    Pt is 9 weeks gestation

## 2020-12-13 ENCOUNTER — HOSPITAL ENCOUNTER (EMERGENCY)
Age: 30
Discharge: HOME | End: 2020-12-13
Attending: EMERGENCY MEDICINE
Payer: COMMERCIAL

## 2020-12-13 VITALS
BODY MASS INDEX: 28.17 KG/M2 | TEMPERATURE: 98.4 F | WEIGHT: 165 LBS | SYSTOLIC BLOOD PRESSURE: 116 MMHG | OXYGEN SATURATION: 98 % | HEART RATE: 95 BPM | HEIGHT: 64 IN | DIASTOLIC BLOOD PRESSURE: 76 MMHG | RESPIRATION RATE: 16 BRPM

## 2020-12-13 LAB
ABO/RH: NORMAL
ABSOLUTE EOS #: 0.2 K/UL (ref 0–0.4)
ABSOLUTE IMMATURE GRANULOCYTE: ABNORMAL K/UL (ref 0–0.3)
ABSOLUTE LYMPH #: 3 K/UL (ref 1–4.8)
ABSOLUTE MONO #: 0.6 K/UL (ref 0.1–1.3)
ANION GAP SERPL CALCULATED.3IONS-SCNC: 9 MMOL/L (ref 9–17)
BASOPHILS # BLD: 1 % (ref 0–2)
BASOPHILS ABSOLUTE: 0.1 K/UL (ref 0–0.2)
BILIRUBIN URINE: NEGATIVE
BUN BLDV-MCNC: 8 MG/DL (ref 6–20)
BUN/CREAT BLD: ABNORMAL (ref 9–20)
CALCIUM SERPL-MCNC: 9.2 MG/DL (ref 8.6–10.4)
CHLORIDE BLD-SCNC: 107 MMOL/L (ref 98–107)
CO2: 22 MMOL/L (ref 20–31)
COLOR: YELLOW
COMMENT UA: NORMAL
CREAT SERPL-MCNC: 0.49 MG/DL (ref 0.5–0.9)
DIFFERENTIAL TYPE: ABNORMAL
DIRECT EXAM: ABNORMAL
EOSINOPHILS RELATIVE PERCENT: 2 % (ref 0–4)
GFR AFRICAN AMERICAN: >60 ML/MIN
GFR NON-AFRICAN AMERICAN: >60 ML/MIN
GFR SERPL CREATININE-BSD FRML MDRD: ABNORMAL ML/MIN/{1.73_M2}
GFR SERPL CREATININE-BSD FRML MDRD: ABNORMAL ML/MIN/{1.73_M2}
GLUCOSE BLD-MCNC: 101 MG/DL (ref 70–99)
GLUCOSE URINE: NEGATIVE
HCG QUANTITATIVE: ABNORMAL IU/L
HCT VFR BLD CALC: 34.9 % (ref 36–46)
HEMOGLOBIN: 11.8 G/DL (ref 12–16)
IMMATURE GRANULOCYTES: ABNORMAL %
KETONES, URINE: NEGATIVE
LEUKOCYTE ESTERASE, URINE: NEGATIVE
LYMPHOCYTES # BLD: 29 % (ref 24–44)
Lab: ABNORMAL
MCH RBC QN AUTO: 32.8 PG (ref 26–34)
MCHC RBC AUTO-ENTMCNC: 33.9 G/DL (ref 31–37)
MCV RBC AUTO: 96.8 FL (ref 80–100)
MONOCYTES # BLD: 6 % (ref 1–7)
NITRITE, URINE: NEGATIVE
NRBC AUTOMATED: ABNORMAL PER 100 WBC
PDW BLD-RTO: 13.1 % (ref 11.5–14.9)
PH UA: 7.5 (ref 5–8)
PLATELET # BLD: 236 K/UL (ref 150–450)
PLATELET ESTIMATE: ABNORMAL
PMV BLD AUTO: 8.7 FL (ref 6–12)
POTASSIUM SERPL-SCNC: 3.7 MMOL/L (ref 3.7–5.3)
PROTEIN UA: NEGATIVE
RBC # BLD: 3.6 M/UL (ref 4–5.2)
RBC # BLD: ABNORMAL 10*6/UL
SEG NEUTROPHILS: 62 % (ref 36–66)
SEGMENTED NEUTROPHILS ABSOLUTE COUNT: 6.5 K/UL (ref 1.3–9.1)
SODIUM BLD-SCNC: 138 MMOL/L (ref 135–144)
SPECIFIC GRAVITY UA: 1.02 (ref 1–1.03)
SPECIMEN DESCRIPTION: ABNORMAL
TURBIDITY: CLEAR
URINE HGB: NEGATIVE
UROBILINOGEN, URINE: NORMAL
WBC # BLD: 10.4 K/UL (ref 3.5–11)
WBC # BLD: ABNORMAL 10*3/UL

## 2020-12-13 PROCEDURE — 85025 COMPLETE CBC W/AUTO DIFF WBC: CPT

## 2020-12-13 PROCEDURE — 86900 BLOOD TYPING SEROLOGIC ABO: CPT

## 2020-12-13 PROCEDURE — 87480 CANDIDA DNA DIR PROBE: CPT

## 2020-12-13 PROCEDURE — 87510 GARDNER VAG DNA DIR PROBE: CPT

## 2020-12-13 PROCEDURE — 87591 N.GONORRHOEAE DNA AMP PROB: CPT

## 2020-12-13 PROCEDURE — 87491 CHLMYD TRACH DNA AMP PROBE: CPT

## 2020-12-13 PROCEDURE — 86901 BLOOD TYPING SEROLOGIC RH(D): CPT

## 2020-12-13 PROCEDURE — 99284 EMERGENCY DEPT VISIT MOD MDM: CPT

## 2020-12-13 PROCEDURE — 80048 BASIC METABOLIC PNL TOTAL CA: CPT

## 2020-12-13 PROCEDURE — 87660 TRICHOMONAS VAGIN DIR PROBE: CPT

## 2020-12-13 PROCEDURE — 36415 COLL VENOUS BLD VENIPUNCTURE: CPT

## 2020-12-13 PROCEDURE — 84702 CHORIONIC GONADOTROPIN TEST: CPT

## 2020-12-13 PROCEDURE — 81003 URINALYSIS AUTO W/O SCOPE: CPT

## 2020-12-13 ASSESSMENT — ENCOUNTER SYMPTOMS
SORE THROAT: 0
SHORTNESS OF BREATH: 0
NAUSEA: 0
BLOOD IN STOOL: 0
COUGH: 0
ABDOMINAL PAIN: 0
VOMITING: 0
CONSTIPATION: 0
COLOR CHANGE: 0
BACK PAIN: 0
DIARRHEA: 0
TROUBLE SWALLOWING: 0

## 2020-12-13 ASSESSMENT — PAIN SCALES - GENERAL: PAINLEVEL_OUTOF10: 3

## 2020-12-13 ASSESSMENT — PAIN DESCRIPTION - DESCRIPTORS: DESCRIPTORS: CRAMPING;DULL

## 2020-12-14 NOTE — ED PROVIDER NOTES
[address]  EMERGENCY DEPARTMENT ENCOUNTER    Pt Name: Royal Davidson  MRN: [id number]  YOB: 1990  Date of evaluation:12/13/20  PCP: No primary care provider on file. CHIEF COMPLAINT       Chief Complaint   Patient presents with    Bleeding During Pregnancy       HISTORY OF PRESENT ILLNESS    Royal Davidson is a 27 y.o. female who presents with a chief complaint of vaginal bleeding. Patient states she woke up today and had some vaginal bleeding. States several days ago she had some spotting but today it is worse. Denies any abdominal pain right now. No nausea or vomiting. No vaginal discharge. No changes in bowel or bladder habits. She has had an ultrasound confirming an intrauterine pregnancy. She is in her first trimester approximately 9 weeks pregnant at this time. No fevers, chills other illnesses. Symptoms are acute. Symptoms are moderate. Other make symptoms better or worse. Patient has no other complaints at this time. REVIEW OF SYSTEMS       Review of Systems   Constitutional: Negative for chills, fatigue and fever. HENT: Negative for congestion, ear pain, sore throat and trouble swallowing. Eyes: Negative for visual disturbance. Respiratory: Negative for cough and shortness of breath. Cardiovascular: Negative for chest pain, palpitations and leg swelling. Gastrointestinal: Negative for abdominal pain, blood in stool, constipation, diarrhea, nausea and vomiting. Genitourinary: Positive for vaginal bleeding. Negative for dysuria, flank pain and vaginal discharge. Musculoskeletal: Negative for arthralgias, back pain, myalgias and neck pain. Skin: Negative for color change, rash and wound. Neurological: Negative for dizziness, weakness, light-headedness, numbness and headaches. Psychiatric/Behavioral: Negative for confusion. All other systems reviewed and are negative.     Negative in 10 essential Systems except as mentioned above and in the HPI.        PAST MEDICAL HISTORY     Past Medical History:   Diagnosis Date    Abnormal Pap smear     Anemia     Complication of anesthesia     low blood pressure and combative upon awaking from general anesthesia    Deviated septum     Endometriosis     GERD (gastroesophageal reflux disease)     Postpartum depression     Spondylisthesis          SURGICAL HISTORY      has a past surgical history that includes Dilation and curettage of uterus; Cholecystectomy; Colonoscopy; Upper gastrointestinal endoscopy; Colposcopy; hernia repair (2012); and pr cerclage cervix w preg,vag apprch (N/A, 6/1/2018). CURRENT MEDICATIONS       Discharge Medication List as of 12/13/2020  9:24 PM      CONTINUE these medications which have NOT CHANGED    Details   Prenatal Vit-Fe Fumarate-FA (PNV PRENATAL PLUS MULTIVITAMIN) 27-1 MG TABS Take 1 tablet by mouth daily, Disp-30 tablet,R-11Normal      promethazine (PHENERGAN) 25 MG tablet Take 1 tablet by mouth every 8 hours as needed for Nausea, Disp-30 tablet,R-2Normal             ALLERGIES     is allergic to cephalexin. FAMILY HISTORY     She indicated that the status of her father is unknown. She indicated that the status of her brother is unknown.     family history includes Cancer in her father; Diabetes in her father; Stroke in her brother and father. SOCIAL HISTORY      reports that she has quit smoking. She smoked 0.50 packs per day. She has never used smokeless tobacco. She reports previous alcohol use. She reports that she does not use drugs. PHYSICAL EXAM     INITIAL VITALS:  height is 5' 4\" (1.626 m) and weight is 165 lb (74.8 kg). Her oral temperature is 98.4 °F (36.9 °C). Her blood pressure is 116/76 and her pulse is 95. Her respiration is 16 and oxygen saturation is 98%. Physical Exam  Vitals signs and nursing note reviewed. Constitutional:       General: She is not in acute distress. HENT:      Head: Normocephalic and atraumatic.    Eyes: Conjunctiva/sclera: Conjunctivae normal.      Pupils: Pupils are equal, round, and reactive to light. Neck:      Musculoskeletal: Neck supple. Cardiovascular:      Rate and Rhythm: Normal rate and regular rhythm. Heart sounds: Normal heart sounds. Pulmonary:      Effort: Pulmonary effort is normal. No respiratory distress. Breath sounds: Normal breath sounds. Abdominal:      General: Bowel sounds are normal. There is no distension. Palpations: Abdomen is soft. Tenderness: There is no abdominal tenderness. Genitourinary:     Vagina: Vaginal discharge present. No bleeding. Cervix: Normal.   Musculoskeletal:         General: No tenderness. Skin:     General: Skin is warm and dry. Findings: No rash. Neurological:      Mental Status: She is alert and oriented to person, place, and time. Psychiatric:         Judgment: Judgment normal.           DIFFERENTIAL DIAGNOSIS/MDM:   80-year-old female presents with vaginal bleeding in first trimester. She is afebrile, nontoxic, normal vital signs. No acute distress. No abdominal pain. Recent ultrasound does confirm a an intrauterine pregnancy. Very low suspicion for ectopic pregnancy. We will do pelvic exam to see if her cervix is open, get lab work, urinalysis. DIAGNOSTIC RESULTS     EKG: All EKG's are interpreted by the Emergency Department Physician who either signs or Co-signs this chart in the absence of a cardiologist.        RADIOLOGY:   I directly visualized the following  images and reviewed the radiologist interpretations:  No orders to display           ED BEDSIDE ULTRASOUND:      LABS:  Labs Reviewed   VAGINITIS DNA PROBE - Abnormal; Notable for the following components:       Result Value    Direct Exam POSITIVE for Gardnerella vaginalis.  (*)     All other components within normal limits   CBC WITH AUTO DIFFERENTIAL - Abnormal; Notable for the following components:    RBC 3.60 (*)     Hemoglobin 11.8 (*) Hematocrit 34.9 (*)     All other components within normal limits   HCG, QUANTITATIVE, PREGNANCY - Abnormal; Notable for the following components:    hCG Quant 96,202 (*)     All other components within normal limits   BASIC METABOLIC PANEL - Abnormal; Notable for the following components:    Glucose 101 (*)     CREATININE 0.49 (*)     All other components within normal limits   C.TRACHOMATIS N.GONORRHOEAE DNA   URINE RT REFLEX TO CULTURE   ABO/RH         EMERGENCY DEPARTMENT COURSE:   Vitals:    Vitals:    12/13/20 1736   BP: 116/76   Pulse: 95   Resp: 16   Temp: 98.4 °F (36.9 °C)   TempSrc: Oral   SpO2: 98%   Weight: 165 lb (74.8 kg)   Height: 5' 4\" (1.626 m)     9:46 PM EST  Pelvic exam shows closed cervical os. No bleeding that I can appreciate. There is a mild amount of whitish discharge. She is positive for BV however due to her being in the first trimester and not being symptomatic we will not treat at this time. Sherol Gent is appropriately elevated at 96,000. Told patient it is possible that she is having a miscarriage however positive finding with her closed cervix and her normal blood work here. I advised her to follow-up with OB/GYN in 2 days for hCG recheck or to return here. She is agreeable with this plan will be discharged home today. Also advised her to return sooner if she develops any worsening symptoms. Patient is agreeable with this plan. CRITICALCARE:      CONSULTS:  None      PROCEDURES:    FINAL IMPRESSION      1.  Threatened miscarriage in early pregnancy            DISPOSITION/PLAN   DISPOSITION Decision To Discharge 12/13/2020 09:23:46 PM          PATIENT REFERRED TO:  [facility]  [address]  [address] [address]  [phone]    As needed, If symptoms worsen    Cindy Tapia, [address] [facility]  [address] [facility]  [address] [phone]    Schedule an appointment as soon as possible for a visit         DISCHARGE MEDICATIONS:  Discharge Medication List as of 12/13/2020  9:24 PM          (Please note that portions ofthis note were completed with a voice recognition program.  Efforts were made to edit the dictations but occasionally words are mis-transcribed.)    Rosanna Carter DO  Attending Emergency Physician          Rosanna Carter DO  12/13/20 [id number]

## 2020-12-15 LAB
C TRACH DNA GENITAL QL NAA+PROBE: NEGATIVE
N. GONORRHOEAE DNA: NEGATIVE
SPECIMEN DESCRIPTION: NORMAL

## 2020-12-30 ENCOUNTER — INITIAL PRENATAL (OUTPATIENT)
Dept: OBGYN CLINIC | Age: 30
End: 2020-12-30

## 2020-12-30 ENCOUNTER — HOSPITAL ENCOUNTER (OUTPATIENT)
Age: 30
Setting detail: SPECIMEN
Discharge: HOME | End: 2020-12-30
Payer: COMMERCIAL

## 2020-12-30 VITALS
HEART RATE: 93 BPM | TEMPERATURE: 97.2 F | HEIGHT: 64 IN | WEIGHT: 167 LBS | BODY MASS INDEX: 28.51 KG/M2 | SYSTOLIC BLOOD PRESSURE: 127 MMHG | DIASTOLIC BLOOD PRESSURE: 79 MMHG

## 2020-12-30 PROBLEM — Z98.890 HISTORY OF CERCLAGE, CURRENTLY PREGNANT, FIRST TRIMESTER: Status: ACTIVE | Noted: 2020-12-30

## 2020-12-30 PROBLEM — O09.91 ENCOUNTER FOR SUPERVISION OF HIGH RISK PREGNANCY IN FIRST TRIMESTER, ANTEPARTUM: Status: ACTIVE | Noted: 2018-09-09

## 2020-12-30 PROBLEM — Z87.59 HISTORY OF GESTATIONAL HYPERTENSION: Status: ACTIVE | Noted: 2020-12-30

## 2020-12-30 PROBLEM — O09.291 HISTORY OF CERCLAGE, CURRENTLY PREGNANT, FIRST TRIMESTER: Status: ACTIVE | Noted: 2020-12-30

## 2020-12-30 LAB
AMPHETAMINE SCREEN URINE: NEGATIVE
BARBITURATE SCREEN URINE: NEGATIVE
BENZODIAZEPINE SCREEN, URINE: NEGATIVE
BUPRENORPHINE URINE: NORMAL
CANNABINOID SCREEN URINE: NEGATIVE
COCAINE METABOLITE, URINE: NEGATIVE
HEPATITIS B SURFACE ANTIGEN: NONREACTIVE
HIV AG/AB: NONREACTIVE
MDMA URINE: NORMAL
METHADONE SCREEN, URINE: NEGATIVE
METHAMPHETAMINE, URINE: NORMAL
OPIATES, URINE: NEGATIVE
OXYCODONE SCREEN URINE: NEGATIVE
PHENCYCLIDINE, URINE: NEGATIVE
PROPOXYPHENE, URINE: NORMAL
RUBV IGG SER QL: 69.7 IU/ML
T. PALLIDUM, IGG: NONREACTIVE
TEST INFORMATION: NORMAL
TRICYCLIC ANTIDEPRESSANTS, UR: NORMAL
TSH SERPL DL<=0.05 MIU/L-ACNC: 1.5 MIU/L (ref 0.3–5)

## 2020-12-30 PROCEDURE — 0500F INITIAL PRENATAL CARE VISIT: CPT | Performed by: CLINICAL NURSE SPECIALIST

## 2020-12-30 RX ORDER — METRONIDAZOLE 500 MG/1
500 TABLET ORAL 2 TIMES DAILY
Qty: 14 TABLET | Refills: 0 | Status: SHIPPED | OUTPATIENT
Start: 2020-12-30 | End: 2021-01-06

## 2020-12-30 RX ORDER — FLUCONAZOLE 100 MG/1
100 TABLET ORAL DAILY
Qty: 7 TABLET | Refills: 0 | Status: SHIPPED | OUTPATIENT
Start: 2020-12-30 | End: 2021-01-06

## 2020-12-30 NOTE — PROGRESS NOTES
Annie Marin is a 27 y.o. female 11w2d    [id number]    OB History    Para Term  AB Living   4 2 1 1 1 2   SAB TAB Ectopic Molar Multiple Live Births   1       0 2      # Outcome Date GA Lbr Chema/2nd Weight Sex Delivery Anes PTL Lv   4 Current            3 Term 18 38w3d 00:48 / 00:43 7 lb 6.9 oz (3.37 kg) F Vag-Spont EPI N CASTILLO   2  10/18/11 28w0d  3 lb 9 oz (1.616 kg) F Vag-Spont EPI Y CASTILLO      Complications: Placenta previa, Placental abruption in third trimester   1 SAB  13w0d             Birth Comments: had d&c       Blood pressure 127/79, pulse 93, temperature 97.2 °F (36.2 °C), temperature source Infrared, height 5' 4\" (1.626 m), weight 167 lb (75.8 kg), last menstrual period 10/15/2020, not currently breastfeeding. The patient was seen and evaluated. There was N/A fetal movements. No contractions or leakage of fluid. Signs and symptoms of  labor as well as labor were reviewed. Dates were reviewed with the patient. The physical exam will be completed along with pap at next visit. Prenatal labs were drawn today. The patient will return to the office for her next visit in 1 weeks. See antepartum flow sheet. Patient Active Problem List    Diagnosis Date Noted    18  F Apg 8/9 Wt 7#7 2018    HRP (high risk pregnancy), third trimester 2018    S/P Vaz Cervical cerclage placed 18, removed 2018     Knot at 12 o'clock      Noncompliance 2018    Poor historian 2018    Rh+/RI/GBS pos 2018    H/O PTD (G2 @ 28w) 05/15/2018        Diagnosis Orders   1. Encounter for supervision of high risk pregnancy in first trimester, antepartum  Hepatitis B Surface Antigen    T. Pallidum Ab    Rubella Antibody, IgG   2. Pregnancy with inconclusive fetal viability, single or unspecified fetus  Glucose tolerance, 1 hour   3.  BV (bacterial vaginosis)  metroNIDAZOLE (FLAGYL) 500 MG tablet   4. 11 weeks gestation of pregnancy

## 2021-01-11 ENCOUNTER — HOSPITAL ENCOUNTER (OUTPATIENT)
Age: 31
Setting detail: SPECIMEN
Discharge: HOME | End: 2021-01-11
Payer: COMMERCIAL

## 2021-01-11 ENCOUNTER — ROUTINE PRENATAL (OUTPATIENT)
Dept: OBGYN CLINIC | Age: 31
End: 2021-01-11

## 2021-01-11 VITALS
BODY MASS INDEX: 29.9 KG/M2 | DIASTOLIC BLOOD PRESSURE: 81 MMHG | SYSTOLIC BLOOD PRESSURE: 125 MMHG | WEIGHT: 174.2 LBS | HEART RATE: 92 BPM

## 2021-01-11 DIAGNOSIS — Z3A.13 13 WEEKS GESTATION OF PREGNANCY: Primary | ICD-10-CM

## 2021-01-11 DIAGNOSIS — O09.291 HISTORY OF CERCLAGE, CURRENTLY PREGNANT, FIRST TRIMESTER: ICD-10-CM

## 2021-01-11 DIAGNOSIS — Z98.890 HISTORY OF CERCLAGE, CURRENTLY PREGNANT, FIRST TRIMESTER: ICD-10-CM

## 2021-01-11 DIAGNOSIS — Z34.82 PRENATAL CARE, SUBSEQUENT PREGNANCY, SECOND TRIMESTER: ICD-10-CM

## 2021-01-11 DIAGNOSIS — O36.80X0 PREGNANCY WITH INCONCLUSIVE FETAL VIABILITY, SINGLE OR UNSPECIFIED FETUS: ICD-10-CM

## 2021-01-11 PROBLEM — O09.91 ENCOUNTER FOR SUPERVISION OF HIGH RISK PREGNANCY IN FIRST TRIMESTER, ANTEPARTUM: Status: RESOLVED | Noted: 2018-09-09 | Resolved: 2021-01-11

## 2021-01-11 PROCEDURE — 0502F SUBSEQUENT PRENATAL CARE: CPT | Performed by: SPECIALIST

## 2021-01-11 SDOH — ECONOMIC STABILITY: FOOD INSECURITY: WITHIN THE PAST 12 MONTHS, THE FOOD YOU BOUGHT JUST DIDN'T LAST AND YOU DIDN'T HAVE MONEY TO GET MORE.: NEVER TRUE

## 2021-01-11 SDOH — ECONOMIC STABILITY: INCOME INSECURITY: HOW HARD IS IT FOR YOU TO PAY FOR THE VERY BASICS LIKE FOOD, HOUSING, MEDICAL CARE, AND HEATING?: NOT HARD AT ALL

## 2021-01-11 SDOH — ECONOMIC STABILITY: TRANSPORTATION INSECURITY
IN THE PAST 12 MONTHS, HAS LACK OF TRANSPORTATION KEPT YOU FROM MEETINGS, WORK, OR FROM GETTING THINGS NEEDED FOR DAILY LIVING?: NO

## 2021-01-11 ASSESSMENT — PATIENT HEALTH QUESTIONNAIRE - PHQ9
SUM OF ALL RESPONSES TO PHQ QUESTIONS 1-9: 0
SUM OF ALL RESPONSES TO PHQ QUESTIONS 1-9: 0
1. LITTLE INTEREST OR PLEASURE IN DOING THINGS: 0
SUM OF ALL RESPONSES TO PHQ9 QUESTIONS 1 & 2: 0

## 2021-01-11 NOTE — PROGRESS NOTES
Austin Almeida is a 27 y.o. female 13w0d    [id number]    OB History    Para Term  AB Living   4 2 1 1 1 2   SAB TAB Ectopic Molar Multiple Live Births   1       0 2      # Outcome Date GA Lbr Chema/2nd Weight Sex Delivery Anes PTL Lv   4 Current            3 Term 18 38w3d 00:48 / 00:43 7 lb 6.9 oz (3.37 kg) F Vag-Spont EPI N CASTILLO   2  10/18/11 28w0d  3 lb 9 oz (1.616 kg) F Vag-Spont EPI Y CASTILLO      Complications: Placenta previa, Placental abruption in third trimester   1 SAB  13w0d             Birth Comments: had d&c       Chief Complaint   Patient presents with    Routine Prenatal Visit     Patient is 13 weeks gestation and is here for supervision of normal pregnacy. Blood pressure 125/81, pulse 92, weight 174 lb 3.2 oz (79 kg), last menstrual period 10/15/2020, not currently breastfeeding. The patient was seen and evaluated. There was N/A fetal movements. No contractions or leakage of fluid. Signs and symptoms of  labor as well as labor were reviewed. Dates were reviewed with the patient. The patient will return to the office for her next visit in 2 weeks. See antepartum flow sheet. GENERAL EXAM:  HEENT: Normal    Thyroid: Normal  Lymph Nodes: Normal  Neurological: Normal  Skin: Normal  Heart:Normal   Lungs: Normal   Breasts: Normal   Abdomen: Normal   Extremities: Normal   Musculoskeletal: Normal    PELVIC EXAM:  Vulva: Normal  Vagina: Normal  Cervix: Normal  Uterus: Enlarged 13 Gestational Weeks  Adnexa: Normal  Rectum: Normal  Spines: Average  Sacrum: Concave  Subpubic Arch: Normal  Diag.  Conjugate: Not Done  Length (cm): N/A  Pelvic Type: Gynecoid      Patient Active Problem List    Diagnosis Date Noted    History of cerclage, currently pregnant, first trimester 2020    History of gestational hypertension 2020    S/P Vaz Cervical cerclage placed 18, removed 2018     Knot at 12 o'clock      Noncompliance 2018  Poor historian 08/13/2018    Rh+/RI/GBS pos 05/31/2018    H/O PTD (G2 @ 28w) 05/15/2018        Diagnosis Orders   1. 13 weeks gestation of pregnancy     2. History of cerclage, currently pregnant, first trimester  US OB TRANSVAGINAL   3. Prenatal care, subsequent pregnancy, second trimester         Patient doing well. Patient advised to schedule appointment for cervical length in 2 weeks. If cervix found to be shortened, cerclage may be considered. Fetal heart rate auscultated at 142 bpm and fundal height was not done today due to early gestational age. Pap done today. Negative cultures from 12/13/2020 were reviewed. Patient advised to continue taking prenatal vitamins and to rest as necessary. Romina Klein am scribing for, and in the presence of Dr. Ramy Morales. Electronically signed by: Terrell Lee 1/11/21 11:56 AM   I agree to the above documentation placed by my scribe Terrell Lee. I reviewed the scribe's note and agree with the documented findings and plan of care. Any areas of disagreement are noted on the chart. I have personally evaluated this patient. Additional findings are as noted. I agree with the chief complaint, past medical history, past surgical history, allergies, medications, social and family history as documented unless otherwise noted below.      Electronically signed by Ramy Morales MD on 1/12/2021 at 1:37 AM

## 2021-01-12 ENCOUNTER — TELEPHONE (OUTPATIENT)
Dept: OBGYN CLINIC | Age: 31
End: 2021-01-12

## 2021-01-12 DIAGNOSIS — R73.09 ELEVATED GLUCOSE TOLERANCE TEST: Primary | ICD-10-CM

## 2021-01-12 LAB
GLUCOSE ADMINISTRATION: ABNORMAL
GLUCOSE TOLERANCE SCREEN 50G: 148 MG/DL (ref 70–135)

## 2021-01-13 LAB
HPV SAMPLE: NORMAL
HPV, GENOTYPE 16: NOT DETECTED
HPV, GENOTYPE 18: NOT DETECTED
HPV, HIGH RISK OTHER: NOT DETECTED
HPV, INTERPRETATION: NORMAL
SPECIMEN DESCRIPTION: NORMAL

## 2021-01-18 ENCOUNTER — TELEPHONE (OUTPATIENT)
Dept: OBGYN CLINIC | Age: 31
End: 2021-01-18

## 2021-01-18 LAB — CYTOLOGY REPORT: NORMAL

## 2021-01-18 NOTE — TELEPHONE ENCOUNTER
----- Message from GOSIA Villatoro CNP sent at 1/12/2021  8:04 AM EST -----  Please let the patient know that she failed her 1 ht 148 and will need a 3 hr GTT and the order has been placed

## 2021-01-28 ENCOUNTER — HOSPITAL ENCOUNTER (OUTPATIENT)
Age: 31
Discharge: HOME | End: 2021-01-28
Payer: COMMERCIAL

## 2021-01-28 LAB
3 HR GLUCOSE: 94 MG/DL (ref 65–139)
AMOUNT GLUCOSE GIVEN: 100 G
GLUCOSE FASTING: 101 MG/DL (ref 65–94)
GLUCOSE TOLERANCE TEST 1 HOUR: 150 MG/DL (ref 65–179)
GLUCOSE TOLERANCE TEST 2 HOUR: 99 MG/DL (ref 65–154)

## 2021-01-28 PROCEDURE — 36415 COLL VENOUS BLD VENIPUNCTURE: CPT

## 2021-01-28 PROCEDURE — 82951 GLUCOSE TOLERANCE TEST (GTT): CPT

## 2021-01-28 PROCEDURE — 82952 GTT-ADDED SAMPLES: CPT

## 2021-02-10 ENCOUNTER — ROUTINE PRENATAL (OUTPATIENT)
Dept: OBGYN CLINIC | Age: 31
End: 2021-02-10

## 2021-02-10 ENCOUNTER — HOSPITAL ENCOUNTER (OUTPATIENT)
Age: 31
Setting detail: SPECIMEN
Discharge: HOME | End: 2021-02-10
Payer: COMMERCIAL

## 2021-02-10 VITALS
SYSTOLIC BLOOD PRESSURE: 119 MMHG | DIASTOLIC BLOOD PRESSURE: 81 MMHG | HEART RATE: 89 BPM | WEIGHT: 180 LBS | BODY MASS INDEX: 30.9 KG/M2

## 2021-02-10 DIAGNOSIS — O09.92 ENCOUNTER FOR SUPERVISION OF HIGH RISK PREGNANCY IN SECOND TRIMESTER, ANTEPARTUM: Primary | ICD-10-CM

## 2021-02-10 DIAGNOSIS — Z3A.18 18 WEEKS GESTATION OF PREGNANCY: ICD-10-CM

## 2021-02-10 DIAGNOSIS — O09.892 HISTORY OF PRETERM DELIVERY, CURRENTLY PREGNANT IN SECOND TRIMESTER: ICD-10-CM

## 2021-02-10 DIAGNOSIS — O09.292 HISTORY OF CERCLAGE, CURRENTLY PREGNANT, SECOND TRIMESTER: ICD-10-CM

## 2021-02-10 DIAGNOSIS — Z98.890 HISTORY OF CERCLAGE, CURRENTLY PREGNANT, SECOND TRIMESTER: ICD-10-CM

## 2021-02-10 DIAGNOSIS — Z34.92 PRENATAL CARE IN SECOND TRIMESTER: Primary | ICD-10-CM

## 2021-02-10 DIAGNOSIS — Z87.59 HISTORY OF GESTATIONAL HYPERTENSION: ICD-10-CM

## 2021-02-10 DIAGNOSIS — Z13.79 GENETIC SCREENING: ICD-10-CM

## 2021-02-10 PROCEDURE — 0502F SUBSEQUENT PRENATAL CARE: CPT | Performed by: CLINICAL NURSE SPECIALIST

## 2021-02-10 NOTE — PROGRESS NOTES
Margarita Marie is a 32 y.o. female 17w2d    [id number]    OB History    Para Term  AB Living   4 2 1 1 1 2   SAB TAB Ectopic Molar Multiple Live Births   1       0 2      # Outcome Date GA Lbr Chema/2nd Weight Sex Delivery Anes PTL Lv   4 Current            3 Term 18 38w3d 00:48 / 00:43 7 lb 6.9 oz (3.37 kg) F Vag-Spont EPI N CASTILLO   2  10/18/11 28w0d  3 lb 9 oz (1.616 kg) F Vag-Spont EPI Y CASTILLO      Complications: Placenta previa, Placental abruption in third trimester   1 SAB  13w0d             Birth Comments: had d&c       Blood pressure 119/81, pulse 89, weight 180 lb (81.6 kg), last menstrual period 10/15/2020, not currently breastfeeding. The patient was seen and evaluated. There was Positive fetal movements. No contractions or leakage of fluid. Signs and symptoms of  labor as well as labor were reviewed. A Quad Screen was ordered for a 15-20 week gestational age window. Dates were reviewed with the patient. An 18-22 week anatomy ultrasound has been ordered. The patient will return to the office for her next visit in 1 weeks. See antepartum flow sheet. Patient Active Problem List    Diagnosis Date Noted    History of cerclage, currently pregnant, first trimester 2020    History of gestational hypertension 2020    S/P Vaz Cervical cerclage placed 18, removed 2018     Knot at 12 o'clock      Noncompliance 2018    Poor historian 2018    History of  delivery, currently pregnant in second trimester 2018    H/O PTD (G2 @ 28w) 05/15/2018        Diagnosis Orders   1. Encounter for supervision of high risk pregnancy in second trimester, antepartum     2. History of cerclage, currently pregnant, second trimester     3. 18 weeks gestation of pregnancy     4. History of gestational hypertension     5.  History of  delivery, currently pregnant in second trimester     Continue prenatal vitamins, increase water intake and frequent rest periods as needed.   Patient cervical length today is over 3 cm      Electronically signed by: Ortiz Courtney CNP

## 2021-02-11 ENCOUNTER — TELEPHONE (OUTPATIENT)
Dept: OBGYN CLINIC | Age: 31
End: 2021-02-11

## 2021-02-11 NOTE — TELEPHONE ENCOUNTER
Referral faxed to [facility] for patient to receive Progesterone injections. Confirmation page received. [facility] For hydroxyprogesterone 250 mg. Confirmation page received.

## 2021-02-11 NOTE — TELEPHONE ENCOUNTER
Fax received reporting that patient has declined progesterone injections or has not picked up medications. Left message on voicemail for patient to return phone call.

## 2021-02-13 LAB
AFP INTERPRETATION: NORMAL
AFP MOM: 0.85
AFP QUAD INTERPRETATION: NORMAL
AFP SPECIMEN: NORMAL
AFP: 29 NG/ML
DATE OF BIRTH: NORMAL
DATING METHOD: NORMAL
DETERMINED BY: NORMAL
DIABETIC: NEGATIVE
DIMERIC INHIBIN A: 265 PG/ML
DUE DATE: NORMAL
ESTIMATED DUE DATE: NORMAL
FAMILY HISTORY NTD: NEGATIVE
GESTATIONAL AGE: NORMAL
HISTORY OF ANEUPLOIDY?: NORMAL
IN VITRO FERTILIZATION: NORMAL
INHIBIN A MOM: 2.03
INSULIN REQ DIABETES: NO
LAST MENSTRUAL PERIOD: NORMAL
MATERNAL AGE AT EDD: 31.5 YR
MATERNAL WEIGHT: 180
MOM FOR HCG, 2ND TRIMESTER: 1.7
MONOCHORIONIC TWINS: NORMAL
NUMBER OF FETUSES: NORMAL
PATIENT WEIGHT UNITS: NORMAL
PATIENT WEIGHT: NORMAL
PATIENT'S HCG, TRI 2: NORMAL IU/L
PREV TRISOMY PREG: NORMAL
RACE (MATERNAL): NORMAL
RACE: NORMAL
REPEAT SPECIMEN?: NORMAL
SMOKING: NORMAL
SMOKING: NORMAL
UE3 MOM: 1.12
UE3 VALUE: 1.58 NG/ML
VALPROIC/CARBAMAZEP: NORMAL
ZZ NTE CLEAN UP: HISTORY: NO

## 2021-02-23 ENCOUNTER — ROUTINE PRENATAL (OUTPATIENT)
Dept: OBGYN CLINIC | Age: 31
End: 2021-02-23

## 2021-02-23 VITALS
BODY MASS INDEX: 31.76 KG/M2 | HEART RATE: 88 BPM | DIASTOLIC BLOOD PRESSURE: 76 MMHG | WEIGHT: 185 LBS | SYSTOLIC BLOOD PRESSURE: 130 MMHG

## 2021-02-23 DIAGNOSIS — O09.92 HRP (HIGH RISK PREGNANCY), SECOND TRIMESTER: Primary | ICD-10-CM

## 2021-02-23 DIAGNOSIS — Z98.890 HISTORY OF CERCLAGE, CURRENTLY PREGNANT, FIRST TRIMESTER: ICD-10-CM

## 2021-02-23 DIAGNOSIS — Z3A.19 19 WEEKS GESTATION OF PREGNANCY: ICD-10-CM

## 2021-02-23 DIAGNOSIS — O09.892 HISTORY OF PRETERM DELIVERY, CURRENTLY PREGNANT IN SECOND TRIMESTER: ICD-10-CM

## 2021-02-23 DIAGNOSIS — O09.291 HISTORY OF CERCLAGE, CURRENTLY PREGNANT, FIRST TRIMESTER: ICD-10-CM

## 2021-02-23 PROCEDURE — 0502F SUBSEQUENT PRENATAL CARE: CPT | Performed by: SPECIALIST

## 2021-02-23 RX ORDER — HYDROXYPROGESTERONE CAPROATE 250 MG/ML
250 INJECTION INTRAMUSCULAR
COMMUNITY
End: 2021-04-06 | Stop reason: CLARIF

## 2021-02-23 RX ORDER — HYDROXYPROGESTERONE CAPROATE 250 MG/ML
1 INJECTION INTRAMUSCULAR ONCE
Status: COMPLETED | OUTPATIENT
Start: 2021-02-23 | End: 2021-02-23

## 2021-02-23 RX ADMIN — HYDROXYPROGESTERONE CAPROATE 1 ML: 250 INJECTION INTRAMUSCULAR at 09:37

## 2021-02-23 NOTE — PROGRESS NOTES
Yuri Brown is a 32 y.o. female 19w1d    [id number]    OB History    Para Term  AB Living   4 2 1 1 1 2   SAB TAB Ectopic Molar Multiple Live Births   1       0 2      # Outcome Date GA Lbr Chema/2nd Weight Sex Delivery Anes PTL Lv   4 Current            3 Term 18 38w3d 00:48 / 00:43 7 lb 6.9 oz (3.37 kg) F Vag-Spont EPI N CASTILLO   2  10/18/11 28w0d  3 lb 9 oz (1.616 kg) F Vag-Spont EPI Y CASTILLO      Complications: Placenta previa, Placental abruption in third trimester   1 SAB  13w0d             Birth Comments: had d&c       Chief Complaint   Patient presents with    High Risk Pregnancy        Blood pressure 130/76, pulse 88, weight 185 lb (83.9 kg), last menstrual period 10/15/2020, not currently breastfeeding. The patient was seen and evaluated. There was positive fetal movements. No contractions or leakage of fluid. Signs and symptoms of  labor as well as labor were reviewed. The patient's anatomy ultrasound has been ordered. The S/S of Pre-Eclampsia were reviewed with the patient in detail. She is to report any of these if they occur. She currently denies any of these. The patient is RH positive Rhogam Ordered: Not indicated. Patient Active Problem List    Diagnosis Date Noted    History of cerclage, currently pregnant, first trimester 2020    History of gestational hypertension 2020    S/P Vaz Cervical cerclage placed 18, removed 2018     Knot at 12 o'clock      Noncompliance 2018    Poor historian 2018    History of  delivery, currently pregnant in second trimester 2018    H/O PTD (G2 @ 28w) 05/15/2018        Diagnosis Orders   1. HRP (high risk pregnancy), second trimester  HYDROXYprogesterone caproate oil injection 1 mL    US OB 14 PLUS WEEKS SINGLE OR FIRST GESTATION    US OB TRANSVAGINAL   2.  History of  delivery, currently pregnant in second trimester  HYDROXYprogesterone caproate oil injection 1 mL    US OB 14 PLUS WEEKS SINGLE OR FIRST GESTATION    US OB TRANSVAGINAL   3. History of cerclage, currently pregnant, first trimester     4. 19 weeks gestation of pregnancy         Patient starting 17-P today. Fetal heart rate auscultated at 155 bpm and fundal height is 19 cm. today. Patient advised to continue taking prenatal vitamins and to rest as necessary. A second trimester ultrasound and cervical length ultrasound were ordered today and patient was advised to schedule an appointment to have these done between 21 to 22 gestational weeks. The patient will return to the office for her next visit in 1 week. See antepartum flow sheet. Rakel Alvarez am scribing for, and in the presence of Dr. Alison Ybarra. Electronically signed by: Nguyen Fischer 2/23/21 9:33 AM   I agree to the above documentation placed by my scribe Nguyen Fischer. I reviewed the scribe's note and agree with the documented findings and plan of care. Any areas of disagreement are noted on the chart. I have personally evaluated this patient. Additional findings are as noted. I agree with the chief complaint, past medical history, past surgical history, allergies, medications, social and family history as documented unless otherwise noted below.      Electronically signed by Alison Ybarra MD on 2/24/2021 at 3:48 AM

## 2021-02-24 ENCOUNTER — ROUTINE PRENATAL (OUTPATIENT)
Dept: OBGYN CLINIC | Age: 31
End: 2021-02-24

## 2021-02-24 DIAGNOSIS — Z98.890 HISTORY OF CERVICAL CERCLAGE: ICD-10-CM

## 2021-02-24 PROCEDURE — 76817 TRANSVAGINAL US OBSTETRIC: CPT | Performed by: SPECIALIST

## 2021-03-03 ENCOUNTER — ROUTINE PRENATAL (OUTPATIENT)
Dept: OBGYN CLINIC | Age: 31
End: 2021-03-03

## 2021-03-03 VITALS
WEIGHT: 189 LBS | HEART RATE: 104 BPM | DIASTOLIC BLOOD PRESSURE: 78 MMHG | BODY MASS INDEX: 32.44 KG/M2 | TEMPERATURE: 98.3 F | SYSTOLIC BLOOD PRESSURE: 132 MMHG

## 2021-03-03 DIAGNOSIS — Z3A.20 20 WEEKS GESTATION OF PREGNANCY: ICD-10-CM

## 2021-03-03 DIAGNOSIS — O09.892 HISTORY OF PRETERM DELIVERY, CURRENTLY PREGNANT IN SECOND TRIMESTER: Primary | ICD-10-CM

## 2021-03-03 PROCEDURE — 0502F SUBSEQUENT PRENATAL CARE: CPT | Performed by: SPECIALIST

## 2021-03-03 RX ORDER — HYDROXYPROGESTERONE CAPROATE 250 MG/ML
250 INJECTION INTRAMUSCULAR ONCE
Status: COMPLETED | OUTPATIENT
Start: 2021-03-03 | End: 2021-03-03

## 2021-03-03 RX ADMIN — HYDROXYPROGESTERONE CAPROATE 250 MG: 250 INJECTION INTRAMUSCULAR at 16:55

## 2021-03-03 NOTE — PROGRESS NOTES
Neida Ziegler is a 32 y.o. female 20w2d        OB History    Para Term  AB Living   4 2 1 1 1 2   SAB TAB Ectopic Molar Multiple Live Births   1       0 2      # Outcome Date GA Lbr Chema/2nd Weight Sex Delivery Anes PTL Lv   4 Current            3 Term 18 38w3d 00:48 / 00:43 7 lb 6.9 oz (3.37 kg) F Vag-Spont EPI N CASTILLO   2  10/18/11 28w0d  3 lb 9 oz (1.616 kg) F Vag-Spont EPI Y CASTILLO      Complications: Placenta previa, Placental abruption in third trimester   1 SAB  13w0d             Birth Comments: had d&c       Chief Complaint   Patient presents with    Routine Prenatal Visit     Patient is 20 weeks and 2 days gestation and is here for supervision of high risk pregnancy.  High Risk Pregnancy     previous  delivery. Blood pressure 132/78, pulse 104, temperature 98.3 °F (36.8 °C), temperature source Temporal, weight 189 lb (85.7 kg), last menstrual period 10/15/2020, not currently breastfeeding. The patient was seen and evaluated. There was positive fetal movements. No contractions or leakage of fluid. Signs and symptoms of  labor as well as labor were reviewed. The patient's anatomy ultrasound has been scheduled. The S/S of Pre-Eclampsia were reviewed with the patient in detail. She is to report any of these if they occur. She currently denies any of these. The patient is RH positive Rhogam Ordered: Not indicated. Patient Active Problem List    Diagnosis Date Noted    20 weeks gestation of pregnancy 2021    History of cerclage, currently pregnant, first trimester 2020    History of gestational hypertension 2020    S/P Vaz Cervical cerclage placed 18, removed 2018     Knot at 12 o'clock      Noncompliance 2018    Poor historian 2018    History of  delivery, currently pregnant in second trimester 2018    H/O PTD (G2 @ 28w) 05/15/2018        Diagnosis Orders   1.  History of  delivery, currently pregnant in second trimester  HYDROXYprogesterone caproate oil injection 250 mg   2. 20 weeks gestation of pregnancy         Patient complains of headaches, otherwise doing well. Patient states that she has a history of migraine but not since she had her wisdom teeth pulled. Patient was advised to rest and to use Tylenol for the headaches. Fetal heart rate auscultated at 144 bpm and fundal height is 20 cm. today. Patient advised to continue taking prenatal vitamins and to rest as necessary. Patient was advised that if her cervix continues to shorten following next ultrasound, she should consider stopping work. The patient will return to the office for her next visit in 1 week. See antepartum flow sheet. Marcus Dias am scribing for, and in the presence of Dr. Jeremy He. Electronically signed by: Donna Medina 3/3/21 4:46 PM   I agree to the above documentation placed by my scribe Donna Medina. I reviewed the scribe's note and agree with the documented findings and plan of care. Any areas of disagreement are noted on the chart. I have personally evaluated this patient. Additional findings are as noted. I agree with the chief complaint, past medical history, past surgical history, allergies, medications, social and family history as documented unless otherwise noted below.      Electronically signed by Jeremy He MD on 3/4/2021 at 4:05 PM

## 2021-03-10 ENCOUNTER — ROUTINE PRENATAL (OUTPATIENT)
Dept: OBGYN CLINIC | Age: 31
End: 2021-03-10

## 2021-03-10 VITALS
HEART RATE: 91 BPM | DIASTOLIC BLOOD PRESSURE: 71 MMHG | WEIGHT: 190 LBS | BODY MASS INDEX: 32.44 KG/M2 | SYSTOLIC BLOOD PRESSURE: 137 MMHG | HEIGHT: 64 IN | TEMPERATURE: 98.8 F

## 2021-03-10 DIAGNOSIS — Z3A.21 21 WEEKS GESTATION OF PREGNANCY: ICD-10-CM

## 2021-03-10 DIAGNOSIS — O26.872 SHORT CERVICAL LENGTH DURING PREGNANCY IN SECOND TRIMESTER: ICD-10-CM

## 2021-03-10 DIAGNOSIS — Z87.59 HISTORY OF GESTATIONAL HYPERTENSION: ICD-10-CM

## 2021-03-10 DIAGNOSIS — O09.92 ENCOUNTER FOR SUPERVISION OF HIGH RISK PREGNANCY IN SECOND TRIMESTER, ANTEPARTUM: Primary | ICD-10-CM

## 2021-03-10 DIAGNOSIS — Z98.890 HISTORY OF CERVICAL CERCLAGE, CURRENTLY PREGNANT, SECOND TRIMESTER: ICD-10-CM

## 2021-03-10 DIAGNOSIS — R60.0 BILATERAL LOWER EXTREMITY EDEMA: ICD-10-CM

## 2021-03-10 DIAGNOSIS — O09.292 HISTORY OF CERVICAL CERCLAGE, CURRENTLY PREGNANT, SECOND TRIMESTER: ICD-10-CM

## 2021-03-10 PROBLEM — Z3A.20 20 WEEKS GESTATION OF PREGNANCY: Status: RESOLVED | Noted: 2021-03-03 | Resolved: 2021-03-10

## 2021-03-10 PROCEDURE — 0502F SUBSEQUENT PRENATAL CARE: CPT | Performed by: CLINICAL NURSE SPECIALIST

## 2021-03-10 RX ORDER — HYDROXYPROGESTERONE CAPROATE 250 MG/ML
250 INJECTION INTRAMUSCULAR
Status: DISCONTINUED | OUTPATIENT
Start: 2021-03-10 | End: 2021-03-19

## 2021-03-10 RX ORDER — BLOOD PRESSURE TEST KIT
1 KIT MISCELLANEOUS DAILY
Qty: 1 KIT | Refills: 0 | Status: SHIPPED | OUTPATIENT
Start: 2021-03-10 | End: 2021-08-25

## 2021-03-10 RX ADMIN — HYDROXYPROGESTERONE CAPROATE 250 MG: 250 INJECTION INTRAMUSCULAR at 11:40

## 2021-03-11 ENCOUNTER — ROUTINE PRENATAL (OUTPATIENT)
Dept: OBGYN CLINIC | Age: 31
End: 2021-03-11

## 2021-03-11 VITALS
WEIGHT: 190 LBS | DIASTOLIC BLOOD PRESSURE: 71 MMHG | SYSTOLIC BLOOD PRESSURE: 121 MMHG | HEART RATE: 88 BPM | BODY MASS INDEX: 32.61 KG/M2

## 2021-03-11 DIAGNOSIS — O26.872 SHORT CERVICAL LENGTH DURING PREGNANCY IN SECOND TRIMESTER: ICD-10-CM

## 2021-03-11 DIAGNOSIS — O09.92 HRP (HIGH RISK PREGNANCY), SECOND TRIMESTER: Primary | ICD-10-CM

## 2021-03-11 DIAGNOSIS — O09.892 HISTORY OF PRETERM DELIVERY, CURRENTLY PREGNANT IN SECOND TRIMESTER: ICD-10-CM

## 2021-03-11 PROCEDURE — 0502F SUBSEQUENT PRENATAL CARE: CPT | Performed by: SPECIALIST

## 2021-03-11 NOTE — PROGRESS NOTES
Aaliyah Bullard is a 32 y.o. female 21w3d        OB History    Para Term  AB Living   4 2 1 1 1 2   SAB TAB Ectopic Molar Multiple Live Births   1       0 2      # Outcome Date GA Lbr Chema/2nd Weight Sex Delivery Anes PTL Lv   4 Current            3 Term 18 38w3d 00:48 / 00:43 7 lb 6.9 oz (3.37 kg) F Vag-Spont EPI N CASTILLO   2  10/18/11 28w0d  3 lb 9 oz (1.616 kg) F Vag-Spont EPI Y CASTILLO      Complications: Placenta previa, Placental abruption in third trimester   1 SAB  13w0d             Birth Comments: had d&c       Chief Complaint   Patient presents with    High Risk Pregnancy        Blood pressure 121/71, pulse 88, weight 190 lb (86.2 kg), last menstrual period 10/15/2020, not currently breastfeeding. The patient was seen and evaluated. There was positive fetal movements. No contractions or leakage of fluid. Signs and symptoms of  labor as well as labor were reviewed. The patient's anatomy ultrasound has been completed and reviewed with patient. The S/S of Pre-Eclampsia were reviewed with the patient in detail. She is to report any of these if they occur. She currently denies any of these. The patient is RH positive Rhogam Ordered: Not indicated. Patient Active Problem List    Diagnosis Date Noted    21 weeks gestation of pregnancy 03/10/2021    Bilateral lower extremity edema 03/10/2021    History of cervical cerclage, currently pregnant, second trimester 2020    History of gestational hypertension 2020    S/P Vaz Cervical cerclage placed 18, removed 2018     Knot at 12 o'clock      Noncompliance 2018    Poor historian 2018    HRP (high risk pregnancy), second trimester 2018    Short cervical length during pregnancy in second trimester 2018     16mm CL found 18 in office  17 mm CL found 18 in office.   Patient currently taking Clarisse Diamond H/O PTD (G2 @ 28w) 05/15/2018        Diagnosis Orders   1. HRP (high risk pregnancy), second trimester     2. History of  delivery, currently pregnant in second trimester     3. Short cervical length during pregnancy in second trimester         Patient with history of  labor and delivery is here following a cervical length of 2.26 cm on ultrasound yesterday. Patient had cervical cerclage done in her previous pregnancy. Explained to patient that there is information both for and against having a cervical cerclage and there are risks and benefits. Patient requests cerclage. Patient will be scheduled for cervical cerclage as soon as possible. Patient was reminded to avoid sex. She was told to consider stopping work for at least 2 to 3 weeks following the cerclage and then further decisions regarding work will be discussed at that time. Fetal heart rate auscultated at 142 bpm and fundal height is 21 cm. today. Patient advised to continue taking prenatal vitamins and to rest as necessary. The patient will return to the office for her next visit in 1 week. See antepartum flow sheet. Marcus Dias am scribing for, and in the presence of Dr. Jeremy He. Electronically signed by: Donna Medina 3/11/21 5:27 PM   I agree to the above documentation placed by my scribe Donna Medina. I reviewed the scribe's note and agree with the documented findings and plan of care. Any areas of disagreement are noted on the chart. I have personally evaluated this patient. Additional findings are as noted. I agree with the chief complaint, past medical history, past surgical history, allergies, medications, social and family history as documented unless otherwise noted below.      Electronically signed by Jeremy He MD on 3/14/2021 at 10:17 AM

## 2021-03-15 ENCOUNTER — TELEPHONE (OUTPATIENT)
Dept: OBGYN CLINIC | Age: 31
End: 2021-03-15

## 2021-03-15 NOTE — TELEPHONE ENCOUNTER
Patient is asking if her cerclage surgery has been set up yet. Please let her know when it has been done.    Thanks

## 2021-03-15 NOTE — TELEPHONE ENCOUNTER
Patient scheduled for cerclage for 03/17/2021 at 0900 a LUIS A TORRES.     Patient informed of surgery date and time. Patient instructed to remain NPO starting at midnight. Patient instructed to arrive to the hospital at 0700. Patient also informed that a nurse will call her on 03/16/2021 at 1:45 p.m.for pre-op questions. Patient gave verbal understanding.

## 2021-03-16 ENCOUNTER — HOSPITAL ENCOUNTER (OUTPATIENT)
Dept: PREADMISSION TESTING | Age: 31
Setting detail: OUTPATIENT SURGERY
Discharge: HOME | End: 2021-03-20
Payer: COMMERCIAL

## 2021-03-16 ENCOUNTER — ANESTHESIA EVENT (OUTPATIENT)
Dept: OPERATING ROOM | Age: 31
End: 2021-03-16
Payer: COMMERCIAL

## 2021-03-16 VITALS — HEIGHT: 64 IN | BODY MASS INDEX: 32.44 KG/M2 | WEIGHT: 190 LBS

## 2021-03-16 NOTE — PROGRESS NOTES
Pre-op Instructions For Out-Patient Surgery    Medication Instructions:  · Please stop herbs and any supplements now (includes vitamins and minerals). · Please contact your surgeon and prescribing physician for pre-op instructions for any blood thinners. None    · If you have inhalers/aerosol treatments at home, please use them the morning of your surgery and bring the inhalers with you to the hospital.  None    · Please take the following medications the morning of your surgery with a sip of water:    None    Surgery Instructions:  1. After midnight before surgery:  Do not eat or drink anything, including water, mints, gum, and hard candy. You may brush your teeth without swallowing. No smoking, chewing tobacco, or street drugs. 2. Please shower or bathe before surgery. 3. Please do not wear any cologne, lotion, powder, deodorant, jewelry, piercings, perfume, makeup, nail polish, hair accessories, or hair spray on the day of surgery. Wear loose comfortable clothing. 4. Leave your valuables at home. Bring a storage case for any glasses/contacts. 5. An adult who is responsible for you MUST drive you home and should be with you for the first 24 hours after surgery. 6. If having out-patient knee and foot surgeries, please arrange for planned crutches, walker, or wheelchair before arriving to the hospital.    The Day of Surgery:  · Arrive at [address] Surgery Entrance at the time directed by your surgeon and check in at the desk. · If you have a living will or healthcare power of , please bring a copy. · You will be taken to the pre-op holding area where you will be prepared for surgery. A physical assessment will be performed by a nurse practitioner or house officer. Your IV will be started and you will meet your anesthesiologist.    · We are currently limiting visitors to only one designated person in the pre-op holding area.   When you go to surgery, your family will be directed to the surgical waiting room, where the doctor should speak with them after your surgery. · After surgery, you will be taken to the recovery room then when you are awake and stable you will go to the short stay unit for preparation to be discharged. Only your one designated person is allowed to come to short stay for your discharge. · If you use a Bi-PAP or C-PAP machine, please bring it with you and leave it in the car in case it is needed in recovery room.

## 2021-03-17 ENCOUNTER — HOSPITAL ENCOUNTER (OUTPATIENT)
Age: 31
Setting detail: OBSERVATION
Discharge: HOME | End: 2021-03-17
Attending: SPECIALIST | Admitting: SPECIALIST
Payer: COMMERCIAL

## 2021-03-17 ENCOUNTER — ANESTHESIA (OUTPATIENT)
Dept: OPERATING ROOM | Age: 31
End: 2021-03-17
Payer: COMMERCIAL

## 2021-03-17 VITALS
BODY MASS INDEX: 32.44 KG/M2 | WEIGHT: 190 LBS | HEART RATE: 98 BPM | TEMPERATURE: 98.8 F | HEIGHT: 64 IN | SYSTOLIC BLOOD PRESSURE: 124 MMHG | OXYGEN SATURATION: 99 % | RESPIRATION RATE: 16 BRPM | DIASTOLIC BLOOD PRESSURE: 67 MMHG

## 2021-03-17 VITALS — SYSTOLIC BLOOD PRESSURE: 93 MMHG | OXYGEN SATURATION: 100 % | DIASTOLIC BLOOD PRESSURE: 50 MMHG | TEMPERATURE: 97.9 F

## 2021-03-17 PROBLEM — O34.30 CERVICAL CERCLAGE SUTURE PRESENT, ANTEPARTUM: Status: ACTIVE | Noted: 2021-03-17

## 2021-03-17 PROBLEM — Z3A.22 22 WEEKS GESTATION OF PREGNANCY: Status: ACTIVE | Noted: 2021-03-17

## 2021-03-17 LAB
SARS-COV-2, RAPID: NOT DETECTED
SPECIMEN DESCRIPTION: NORMAL

## 2021-03-17 PROCEDURE — 7100000000 HC PACU RECOVERY - FIRST 15 MIN: Performed by: SPECIALIST

## 2021-03-17 PROCEDURE — 3700000000 HC ANESTHESIA ATTENDED CARE: Performed by: SPECIALIST

## 2021-03-17 PROCEDURE — 6360000002 HC RX W HCPCS: Performed by: SPECIALIST

## 2021-03-17 PROCEDURE — 59320 REVISION OF CERVIX: CPT | Performed by: SPECIALIST

## 2021-03-17 PROCEDURE — 6360000002 HC RX W HCPCS

## 2021-03-17 PROCEDURE — 2580000003 HC RX 258: Performed by: ANESTHESIOLOGY

## 2021-03-17 PROCEDURE — 99213 OFFICE O/P EST LOW 20 MIN: CPT

## 2021-03-17 PROCEDURE — 3700000001 HC ADD 15 MINUTES (ANESTHESIA): Performed by: SPECIALIST

## 2021-03-17 PROCEDURE — U0002 COVID-19 LAB TEST NON-CDC: HCPCS

## 2021-03-17 PROCEDURE — 2500000003 HC RX 250 WO HCPCS

## 2021-03-17 PROCEDURE — 2709999900 HC NON-CHARGEABLE SUPPLY: Performed by: SPECIALIST

## 2021-03-17 PROCEDURE — G0378 HOSPITAL OBSERVATION PER HR: HCPCS

## 2021-03-17 PROCEDURE — 7100000001 HC PACU RECOVERY - ADDTL 15 MIN: Performed by: SPECIALIST

## 2021-03-17 PROCEDURE — 3600000002 HC SURGERY LEVEL 2 BASE: Performed by: SPECIALIST

## 2021-03-17 PROCEDURE — 3600000012 HC SURGERY LEVEL 2 ADDTL 15MIN: Performed by: SPECIALIST

## 2021-03-17 PROCEDURE — 6360000002 HC RX W HCPCS: Performed by: STUDENT IN AN ORGANIZED HEALTH CARE EDUCATION/TRAINING PROGRAM

## 2021-03-17 PROCEDURE — 6360000002 HC RX W HCPCS: Performed by: ANESTHESIOLOGY

## 2021-03-17 RX ORDER — PROMETHAZINE HYDROCHLORIDE 25 MG/ML
6.25 INJECTION, SOLUTION INTRAMUSCULAR; INTRAVENOUS
Status: DISCONTINUED | OUTPATIENT
Start: 2021-03-17 | End: 2021-03-17

## 2021-03-17 RX ORDER — TERBUTALINE SULFATE 1 MG/ML
0.25 INJECTION, SOLUTION SUBCUTANEOUS EVERY 4 HOURS
Status: DISCONTINUED | OUTPATIENT
Start: 2021-03-17 | End: 2021-03-17

## 2021-03-17 RX ORDER — ONDANSETRON 2 MG/ML
4 INJECTION INTRAMUSCULAR; INTRAVENOUS EVERY 6 HOURS PRN
Status: DISCONTINUED | OUTPATIENT
Start: 2021-03-17 | End: 2021-03-17 | Stop reason: HOSPADM

## 2021-03-17 RX ORDER — BUPIVACAINE HYDROCHLORIDE 7.5 MG/ML
INJECTION, SOLUTION INTRASPINAL PRN
Status: DISCONTINUED | OUTPATIENT
Start: 2021-03-17 | End: 2021-03-17 | Stop reason: SDUPTHER

## 2021-03-17 RX ORDER — TERBUTALINE SULFATE 1 MG/ML
0.25 INJECTION, SOLUTION SUBCUTANEOUS EVERY 4 HOURS
Status: DISCONTINUED | OUTPATIENT
Start: 2021-03-17 | End: 2021-03-17 | Stop reason: HOSPADM

## 2021-03-17 RX ORDER — ONDANSETRON 2 MG/ML
INJECTION INTRAMUSCULAR; INTRAVENOUS PRN
Status: DISCONTINUED | OUTPATIENT
Start: 2021-03-17 | End: 2021-03-17 | Stop reason: SDUPTHER

## 2021-03-17 RX ORDER — TERBUTALINE SULFATE 1 MG/ML
0.25 INJECTION, SOLUTION SUBCUTANEOUS ONCE
Status: COMPLETED | OUTPATIENT
Start: 2021-03-17 | End: 2021-03-17

## 2021-03-17 RX ORDER — MEPERIDINE HYDROCHLORIDE 25 MG/ML
12.5 INJECTION INTRAMUSCULAR; INTRAVENOUS; SUBCUTANEOUS EVERY 5 MIN PRN
Status: DISCONTINUED | OUTPATIENT
Start: 2021-03-17 | End: 2021-03-17 | Stop reason: HOSPADM

## 2021-03-17 RX ORDER — LIDOCAINE HYDROCHLORIDE 10 MG/ML
INJECTION, SOLUTION EPIDURAL; INFILTRATION; INTRACAUDAL; PERINEURAL PRN
Status: DISCONTINUED | OUTPATIENT
Start: 2021-03-17 | End: 2021-03-17 | Stop reason: SDUPTHER

## 2021-03-17 RX ORDER — SODIUM CHLORIDE, SODIUM LACTATE, POTASSIUM CHLORIDE, CALCIUM CHLORIDE 600; 310; 30; 20 MG/100ML; MG/100ML; MG/100ML; MG/100ML
INJECTION, SOLUTION INTRAVENOUS CONTINUOUS
Status: DISCONTINUED | OUTPATIENT
Start: 2021-03-17 | End: 2021-03-17

## 2021-03-17 RX ORDER — PHENYLEPHRINE HYDROCHLORIDE 10 MG/ML
INJECTION INTRAVENOUS PRN
Status: DISCONTINUED | OUTPATIENT
Start: 2021-03-17 | End: 2021-03-17 | Stop reason: SDUPTHER

## 2021-03-17 RX ORDER — LABETALOL HYDROCHLORIDE 5 MG/ML
5 INJECTION, SOLUTION INTRAVENOUS EVERY 10 MIN PRN
Status: DISCONTINUED | OUTPATIENT
Start: 2021-03-17 | End: 2021-03-17 | Stop reason: HOSPADM

## 2021-03-17 RX ORDER — OXYCODONE HYDROCHLORIDE 5 MG/1
5 TABLET ORAL EVERY 4 HOURS PRN
Status: DISCONTINUED | OUTPATIENT
Start: 2021-03-17 | End: 2021-03-17 | Stop reason: HOSPADM

## 2021-03-17 RX ORDER — ACETAMINOPHEN 325 MG/1
650 TABLET ORAL EVERY 4 HOURS PRN
Status: DISCONTINUED | OUTPATIENT
Start: 2021-03-17 | End: 2021-03-17 | Stop reason: HOSPADM

## 2021-03-17 RX ORDER — SODIUM CHLORIDE 0.9 % (FLUSH) 0.9 %
10 SYRINGE (ML) INJECTION PRN
Status: DISCONTINUED | OUTPATIENT
Start: 2021-03-17 | End: 2021-03-17 | Stop reason: HOSPADM

## 2021-03-17 RX ORDER — LIDOCAINE HYDROCHLORIDE 10 MG/ML
1 INJECTION, SOLUTION EPIDURAL; INFILTRATION; INTRACAUDAL; PERINEURAL
Status: DISCONTINUED | OUTPATIENT
Start: 2021-03-17 | End: 2021-03-17 | Stop reason: HOSPADM

## 2021-03-17 RX ORDER — DIPHENHYDRAMINE HYDROCHLORIDE 50 MG/ML
12.5 INJECTION INTRAMUSCULAR; INTRAVENOUS
Status: DISCONTINUED | OUTPATIENT
Start: 2021-03-17 | End: 2021-03-17 | Stop reason: HOSPADM

## 2021-03-17 RX ORDER — PROMETHAZINE HYDROCHLORIDE 12.5 MG/1
12.5 TABLET ORAL EVERY 6 HOURS PRN
Status: DISCONTINUED | OUTPATIENT
Start: 2021-03-17 | End: 2021-03-17 | Stop reason: HOSPADM

## 2021-03-17 RX ORDER — SODIUM CHLORIDE 0.9 % (FLUSH) 0.9 %
10 SYRINGE (ML) INJECTION EVERY 12 HOURS SCHEDULED
Status: DISCONTINUED | OUTPATIENT
Start: 2021-03-17 | End: 2021-03-17 | Stop reason: HOSPADM

## 2021-03-17 RX ORDER — SODIUM CHLORIDE, SODIUM LACTATE, POTASSIUM CHLORIDE, CALCIUM CHLORIDE 600; 310; 30; 20 MG/100ML; MG/100ML; MG/100ML; MG/100ML
INJECTION, SOLUTION INTRAVENOUS CONTINUOUS
Status: DISCONTINUED | OUTPATIENT
Start: 2021-03-17 | End: 2021-03-17 | Stop reason: HOSPADM

## 2021-03-17 RX ADMIN — PHENYLEPHRINE HYDROCHLORIDE 50 MCG: 10 INJECTION INTRAVENOUS at 09:33

## 2021-03-17 RX ADMIN — BUPIVACAINE HYDROCHLORIDE IN DEXTROSE 1.5 ML: 7.5 INJECTION, SOLUTION SUBARACHNOID at 09:16

## 2021-03-17 RX ADMIN — SODIUM CHLORIDE, POTASSIUM CHLORIDE, SODIUM LACTATE AND CALCIUM CHLORIDE: 600; 310; 30; 20 INJECTION, SOLUTION INTRAVENOUS at 08:21

## 2021-03-17 RX ADMIN — ONDANSETRON 4 MG: 2 INJECTION INTRAMUSCULAR; INTRAVENOUS at 09:33

## 2021-03-17 RX ADMIN — SODIUM CHLORIDE, POTASSIUM CHLORIDE, SODIUM LACTATE AND CALCIUM CHLORIDE: 600; 310; 30; 20 INJECTION, SOLUTION INTRAVENOUS at 09:52

## 2021-03-17 RX ADMIN — LIDOCAINE HYDROCHLORIDE 3 ML: 10 INJECTION, SOLUTION EPIDURAL; INFILTRATION; INTRACAUDAL; PERINEURAL at 09:16

## 2021-03-17 RX ADMIN — PROMETHAZINE HYDROCHLORIDE 6.25 MG: 25 INJECTION INTRAMUSCULAR; INTRAVENOUS at 10:45

## 2021-03-17 RX ADMIN — TERBUTALINE SULFATE 0.25 MG: 1 INJECTION SUBCUTANEOUS at 11:35

## 2021-03-17 RX ADMIN — TERBUTALINE SULFATE 0.25 MG: 1 INJECTION SUBCUTANEOUS at 15:31

## 2021-03-17 ASSESSMENT — PULMONARY FUNCTION TESTS
PIF_VALUE: 0
PIF_VALUE: 1
PIF_VALUE: 1
PIF_VALUE: 0
PIF_VALUE: 0
PIF_VALUE: 1
PIF_VALUE: 0
PIF_VALUE: 1
PIF_VALUE: 1
PIF_VALUE: 0
PIF_VALUE: 1
PIF_VALUE: 0
PIF_VALUE: 1
PIF_VALUE: 0
PIF_VALUE: 1
PIF_VALUE: 1
PIF_VALUE: 0
PIF_VALUE: 1

## 2021-03-17 ASSESSMENT — PAIN SCALES - GENERAL
PAINLEVEL_OUTOF10: 0
PAINLEVEL_OUTOF10: 0

## 2021-03-17 ASSESSMENT — PAIN DESCRIPTION - DESCRIPTORS: DESCRIPTORS: DULL;DISCOMFORT

## 2021-03-17 NOTE — PROGRESS NOTES
OBGYN Resident Note  FHT obtained in PACU 135 bpm.     Renae Hoffmann DO  OB/GYN Resident, PGY2  [facility]   3/17/2021 10:47 AM

## 2021-03-17 NOTE — H&P
HISTORY and [id number]       NAME:  Ildefonso Mak  MRN: [id number]   YOB: 1990   Date: 3/17/2021   Age: 32 y.o. Gender: female       COMPLAINT AND PRESENT HISTORY:     Ildefonso Mak is 32 y.o.,  female, here for [facility]. Patient is pregnant of 22 weeks. She has hx of incompetent cervix. This is patient's 4th pregnancy, she has 2 live birth,. She reports having a miscarriage with one. Patient states that she had a cervical cerclage on her last pregnant. Patient reports going weekly for OB visits. Patient states that she is getting Hydroxy progesterone shots with her visits. Patient states that her pregnancy is going well and she is not experiencing anything unusual to her pregnancy. patient has PMH of Anemia, arthritis, GERD, HTN, spondylisthesis- patient is only on prenatal vitamins, zofran PRN  and well as the injections from her OB visits. No bladder or bowel problems. No fever or chills. Patient has been NPO since midnight. No blood thinners. Patient has hx of prolonged emergence with anesthesia. PAST MEDICAL HISTORY     Past Medical History:   Diagnosis Date    Abnormal Pap smear     approx.  2012    Anemia     not currently taking iron    Arthritis     Complication of anesthesia     low blood pressure and combative upon awaking from general anesthesia    Deviated septum     Endometriosis     GERD (gastroesophageal reflux disease)     Hypertension     gestational last pregnancy 2018 preeclampsia    Postpartum depression     Prolonged emergence from general anesthesia     See blow     Seasonal affective disorder ([address]     and does not take any meds    Spondylisthesis        SURGICAL HISTORY       Past Surgical History:   Procedure Laterality Date    CHOLECYSTECTOMY      COLONOSCOPY      COLPOSCOPY      DILATION AND CURETTAGE OF UTERUS      HERNIA REPAIR  [id number]    umbilical    [place] CERCLAGE CERVIX W PREG, [address] N/A 2018    CERVIX CERCLAGE performed by Ahmet Allen MD at [facility] HISTORY       Family History   Problem Relation Age of Onset    Cancer Father     Diabetes Father     Stroke Father     Stroke Brother        SOCIAL HISTORY       Social History     Socioeconomic History    Marital status: Single     Spouse name: Not on file    Number of children: Not on file    Years of education: Not on file    Highest education level: Not on file   Occupational History    Not on file   Social Needs    Financial resource strain: Not hard at all   Cathy-Laurie insecurity     Worry: Never true     Inability: Never true   [employer] needs     Medical: No     Non-medical: No   Tobacco Use    Smoking status: Former Smoker     Packs/day: 0.50     Quit date: 2020     Years since quittin.2    Smokeless tobacco: Never Used   Substance and Sexual Activity    Alcohol use: Not Currently     Comment: socially    Drug use: No    Sexual activity: Yes     Partners: Male   Lifestyle    Physical activity     Days per week: Not on file     Minutes per session: Not on file    Stress: Not on file   Relationships    Social connections     Talks on phone: Not on file     Gets together: Not on file     Attends Adventism service: Not on file     Active member of club or organization: Not on file     Attends meetings of clubs or organizations: Not on file     Relationship status: Not on file    Intimate partner violence     Fear of current or ex partner: Not on file     Emotionally abused: Not on file     Physically abused: Not on file     Forced sexual activity: Not on file   Other Topics Concern    Not on file   Social History Narrative    Not on file           REVIEW OF SYSTEMS      Allergies   Allergen Reactions    Cephalexin Anaphylaxis       No current facility-administered medications on file prior to encounter.       Current Outpatient Medications on File Prior to Encounter   Medication Sig Dispense Refill    Blood Pressure KIT 1 each by Does not apply route daily 1 kit 0    HYDROXYprogesterone caproate 250 MG/ML OIL oil injection Inject 250 mg into the muscle every 7 days      Prenatal Vit-Fe Fumarate-FA (PNV PRENATAL PLUS MULTIVITAMIN) 27-1 MG TABS Take 1 tablet by mouth daily 30 tablet 11       Negative except for what is mentioned in the HPI. GENERAL PHYSICAL EXAM     Vitals :   See vital signs in RN flow sheet. GENERAL APPEARANCE:   Sandi Hines is 32 y.o.,  female, moderately obese, nourished, conscious, alert. Does not appear to be distress or pain at this time. SKIN:  Warm, dry, no cyanosis or jaundice. HEAD:  Normocephalic, atraumatic, no swelling or tenderness. EYES:  Pupils equal, reactive to light. EARS:  No discharge, no marked hearing loss. NOSE:  No rhinorrhea, epistaxis or septal deformity. THROAT:  Not congested. No ulceration bleeding or discharge. NECK:  No stiffness, trachea central.  No palpable masses or L.N.                 CHEST:  Symmetrical and equal on expansion. HEART:  RRR S1 > S2. No audible murmurs or gallops. LUNGS:  Equal on expansion, normal breath sounds. No adventitious sounds. ABDOMEN:  Obese. Soft on palpation. No dysphagia, No localized tenderness. No guarding or rigidity. No palpable hepatosplenomegaly. LYMPHATICS:  No palpable cervical lymphadenopathy. LOCOMOTOR, BACK AND SPINE:  No tenderness or deformities. EXTREMITIES:  Symmetrical, no pretibial edema. Deepaks sign negative. No discoloration or ulcerations. NEUROLOGIC:  The patient is conscious, alert, oriented,Cranial nerve II-XII intact, taste and smell were not examined. No apparent focal sensory or motor deficits.              Joanne Bridge / SURGERY:      HISTORY OF CERVICAL INCOMPETENCE    CERCLAGE      Patient Active Problem List    Diagnosis Date Noted    21 weeks gestation of pregnancy 03/10/2021    Bilateral lower extremity edema 03/10/2021    History of cervical cerclage, currently pregnant, second trimester 12/30/2020    History of gestational hypertension 12/30/2020    S/P Vaz Cervical cerclage placed 6/1/18, removed 9/13 08/13/2018    Noncompliance 08/13/2018    Poor historian 08/13/2018    HRP (high risk pregnancy), second trimester 05/31/2018    Short cervical length during pregnancy in second trimester 05/31/2018    H/O PTD (G2 @ 28w) 05/15/2018           ETHAN Hair, GOSIA - CNP on 3/17/2021 at 7:17 AM

## 2021-03-17 NOTE — ANESTHESIA PROCEDURE NOTES
Spinal Block    Patient location during procedure: OR  Start time: 3/17/2021 9:07 AM  End time: 3/17/2021 9:16 AM  Reason for block: primary anesthetic  Staffing  Performed: other anesthesia staff   Anesthesiologist: Hyacinth Cedillo MD  Resident/CRNA: GOSIA Zacarias - BEBA  Other anesthesia staff: Goyo Dempsey RN  Preanesthetic Checklist  Completed: patient identified, IV checked, site marked, risks and benefits discussed, surgical consent, monitors and equipment checked, pre-op evaluation, timeout performed, anesthesia consent given, oxygen available and patient being monitored  Spinal Block  Patient position: sitting  Prep: Betadine  Patient monitoring: continuous pulse ox and frequent blood pressure checks  Approach: midline  Location: L3/L4  Provider prep: mask and sterile gloves  Local infiltration: lidocaine  Agent: bupivacaine  Dose: 1.5  Dose: 1.5  Needle  Needle type: Pencan   Needle gauge: 24 G  Needle length: 4 in  Assessment  Sensory level: T8  Events: cerebrospinal fluid  Swirl obtained: Yes  CSF: clear  Attempts: 1  Hemodynamics: stable

## 2021-03-17 NOTE — FLOWSHEET NOTE
Patient admitted to room 175 from recovery per ezequiel post cerclage insertion. Assisted into bed, Siderails up x2. Call light in reach. Bed in low position. Oriented to room, surroundings, call system and plan of care. Patient verbalizes understanding.

## 2021-03-17 NOTE — PROGRESS NOTES
Obstetric/Gynecology Resident Interval Note    Patient discussed w/ nurse. Patient denies cramping and is doing well with no complaints. She desires discharge home. She has follow up appointment on Friday.      Discussed w/ Dr. Mattie Andres who is in agreement    Vitals:    03/17/21 1642 03/17/21 1658 03/17/21 1715 03/17/21 1721   BP: 122/63 (!) 122/59 122/65 124/67   Pulse: 91 88 98 98   Resp:   16    Temp:       TempSrc:       SpO2:       Weight:       Height:             Josie Nash DO  OB/GYN Resident, PGY2  [facility] [place]  3/17/2021, 6:01 PM

## 2021-03-17 NOTE — ANESTHESIA PRE PROCEDURE
Department of Anesthesiology  Preprocedure Note       Name:  Danilo Daily   Age:  32 y.o.  :  1990                                          MRN:  [id number]         Date:  3/17/2021      Surgeon: Shelton Nelson):  Bryn Sicard, MD    Procedure: Procedure(s):  CERCLAGE    Medications prior to admission:   Prior to Admission medications    Medication Sig Start Date End Date Taking? Authorizing Provider   Ondansetron HCl (ZOFRAN PO) Take by mouth as needed    Historical Provider, MD   Blood Pressure KIT 1 each by Does not apply route daily 3/10/21   GOSIA Henson CNP   HYDROXYprogesterone caproate 250 MG/ML OIL oil injection Inject 250 mg into the muscle every 7 days    Historical Provider, MD   Prenatal Vit-Fe Fumarate-FA (PNV PRENATAL PLUS MULTIVITAMIN) 27-1 MG TABS Take 1 tablet by mouth daily 11/18/20 3/16/21  GOSIA Henson CNP       Current medications:    Current Facility-Administered Medications   Medication Dose Route Frequency Provider Last Rate Last Admin    lactated ringers infusion   Intravenous Continuous Rose Mccann MD        sodium chloride flush 0.9 % injection 10 mL  10 mL Intravenous 2 times per day Rose Mccann MD        sodium chloride flush 0.9 % injection 10 mL  10 mL Intravenous PRN oRse Mccann MD        lidocaine PF 1 % injection 1 mL  1 mL Intradermal Once PRN Rose Mccann MD           Allergies:     Allergies   Allergen Reactions    Cephalexin Anaphylaxis       Problem List:    Patient Active Problem List   Diagnosis Code    H/O PTD (G2 @ 28w) O09.212    HRP (high risk pregnancy), second trimester O09.92    Short cervical length during pregnancy in second trimester O26.872    S/P Vaz Cervical cerclage placed 18, removed  O34.30    Noncompliance Z91.19    Poor historian Z78.9    History of cervical cerclage, currently pregnant, second trimester O09.292, Z98.890    History of gestational hypertension Z87.59    21 weeks gestation of pregnancy Z3A.21    Bilateral lower extremity edema R60.0       Past Medical History:        Diagnosis Date    Abnormal Pap smear     approx.     Anemia     not currently taking iron    Arthritis     Complication of anesthesia     low blood pressure and combative upon awaking from general anesthesia    Deviated septum     Endometriosis     GERD (gastroesophageal reflux disease)     Hypertension     gestational last pregnancy 2018 preeclampsia    Postpartum depression     Prolonged emergence from general anesthesia     See blow     Seasonal affective disorder (HCC)     and does not take any meds    Spondylisthesis        Past Surgical History:        Procedure Laterality Date    CHOLECYSTECTOMY      COLONOSCOPY      COLPOSCOPY      DILATION AND CURETTAGE OF UTERUS      HERNIA REPAIR  6435    umbilical    [place] CERCLAGE CERVIX W PREG, [address] N/A 2018    CERVIX CERCLAGE performed by Calton Peabody, MD at [facility]         Social History:    Social History     Tobacco Use    Smoking status: Former Smoker     Packs/day: 0.50     Quit date: 2020     Years since quittin.2    Smokeless tobacco: Never Used   Substance Use Topics    Alcohol use: Not Currently     Comment: socially                                Counseling given: Not Answered      Vital Signs (Current): There were no vitals filed for this visit.                                            BP Readings from Last 3 Encounters:   21 121/71   03/10/21 137/71   21 132/78       NPO Status:                                                                                 BMI:   Wt Readings from Last 3 Encounters:   21 190 lb (86.2 kg)   21 190 lb (86.2 kg)   03/10/21 190 lb (86.2 kg)     There is no height or weight on file to calculate BMI.    CBC:   Lab Results   Component Value Date    WBC 10.4 2020    RBC 3.60 2020    RBC 2.99 10/16/2011    HGB 11.8 2020 HCT 34.9 2020    MCV 96.8 2020    RDW 13.1 2020     2020     10/16/2011       CMP:   Lab Results   Component Value Date     2020    K 3.7 2020     2020    CO2 22 2020    BUN 8 2020    CREATININE 0.49 2020    GFRAA >60 2020    LABGLOM >60 2020    GLUCOSE 148 2021    GLUCOSE 101 2020    PROT 6.9 2019    CALCIUM 9.2 2020    BILITOT <0.15 2019    ALKPHOS 69 2019    AST 16 2019    ALT 12 2019       POC Tests: No results for input(s): POCGLU, POCNA, POCK, POCCL, POCBUN, POCHEMO, POCHCT in the last 72 hours. Coags: No results found for: PROTIME, INR, APTT    HCG (If Applicable):   Lab Results   Component Value Date    PREGTESTUR positive (A) 2020    HCG NEGATIVE 10/19/2019    HCGQUANT 96,202 (H) 2020        ABGs: No results found for: PHART, PO2ART, GAO6HNN, LUL0COD, BEART, N5CQMFHH     Type & Screen (If Applicable):  No results found for: LABABO, LABRH    Drug/Infectious Status (If Applicable):  No results found for: HIV, HEPCAB    COVID-19 Screening (If Applicable):   Lab Results   Component Value Date    COVID19 Not Detected 2021           Anesthesia Evaluation  Patient summary reviewed and Nursing notes reviewed history of anesthetic complications (prolonged emergence):    Airway: Mallampati: II  TM distance: >3 FB   Neck ROM: full  Mouth opening: > = 3 FB Dental: normal exam         Pulmonary:Negative Pulmonary ROS and normal exam  breath sounds clear to auscultation                             Cardiovascular:    (+) hypertension:,         Rhythm: regular  Rate: normal                    Neuro/Psych:   (+) neuromuscular disease:, psychiatric history:             ROS comment: H/o spondylolisthesis GI/Hepatic/Renal:   (+) GERD:,           Endo/Other:    (+) : arthritis:., .                  ROS comment:   GA - 22weeks Abdominal:           Vascular: negative vascular ROS. Anesthesia Plan      spinal     ASA 2             Anesthetic plan and risks discussed with patient. Plan discussed with CRNA.             FHR to be monitored by the [facility] team      Anais Muhammad MD   3/17/2021

## 2021-03-17 NOTE — FLOWSHEET NOTE
Contacted Dr. Whit Amador regarding pt discharge. Dr Whit Amador contacted Dr. Mariza Medrano to verify order for discharge. Orders received.

## 2021-03-17 NOTE — FLOWSHEET NOTE
Discharge teaching and instructions completed. AVS reviewed. Patient voiced understanding regarding follow up appointments, and care of self at home. Discharged home per wheelchair.

## 2021-03-17 NOTE — PROGRESS NOTES
OBGYN Resident Note    Fetal heart tones obtained w/ doppler in pre-op, 140 bpm. Informed consent obtained.      Kalia Greenwood DO  OB/GYN Resident, PGY2  [address] [place] [address]   3/17/2021 8:46 AM

## 2021-03-17 NOTE — OP NOTE
for the entire operation. Findings:  Cervix visually dilated 1-2 cm, normal-appearing vaginal mucosa, normal-appearing external genitalia, no evidence of bulging membranes. Estimated Blood Loss: 5 immediately following procedure  Total IV Fluids: 1000ml  Urine output: 100 ml clear urine   Drains:  none  Specimens:  none  Instrument and Sponge Count: Correct  Complications: none  Condition: Mother stable, transfer to post anesthesia recovery    Debbie Sanchez DO  Ob/Gyn Resident  3/17/2021, 9:53 AM    This dictation was performed by a resident physician and then was thoroughly reviewed by the attending prior to being signed.

## 2021-03-19 ENCOUNTER — ROUTINE PRENATAL (OUTPATIENT)
Dept: OBGYN CLINIC | Age: 31
End: 2021-03-19

## 2021-03-19 VITALS
SYSTOLIC BLOOD PRESSURE: 126 MMHG | DIASTOLIC BLOOD PRESSURE: 75 MMHG | BODY MASS INDEX: 33.23 KG/M2 | WEIGHT: 193.6 LBS | HEART RATE: 90 BPM | TEMPERATURE: 97.7 F

## 2021-03-19 DIAGNOSIS — O09.92 HRP (HIGH RISK PREGNANCY), SECOND TRIMESTER: ICD-10-CM

## 2021-03-19 DIAGNOSIS — O09.892 HISTORY OF PRETERM DELIVERY, CURRENTLY PREGNANT IN SECOND TRIMESTER: Primary | ICD-10-CM

## 2021-03-19 DIAGNOSIS — N88.3 INCOMPETENT CERVIX: ICD-10-CM

## 2021-03-19 DIAGNOSIS — Z3A.22 22 WEEKS GESTATION OF PREGNANCY: ICD-10-CM

## 2021-03-19 PROCEDURE — 0502F SUBSEQUENT PRENATAL CARE: CPT | Performed by: SPECIALIST

## 2021-03-19 RX ORDER — HYDROXYPROGESTERONE CAPROATE 250 MG/ML
250 INJECTION INTRAMUSCULAR ONCE
Status: COMPLETED | OUTPATIENT
Start: 2021-03-19 | End: 2021-03-19

## 2021-03-19 RX ORDER — PROMETHAZINE HYDROCHLORIDE 25 MG/1
1 TABLET ORAL EVERY 8 HOURS PRN
COMMUNITY
Start: 2021-03-18 | End: 2021-06-15 | Stop reason: SDUPTHER

## 2021-03-19 RX ADMIN — HYDROXYPROGESTERONE CAPROATE 250 MG: 250 INJECTION INTRAMUSCULAR at 10:05

## 2021-03-19 NOTE — PROGRESS NOTES
Sindy Verma is a 32 y.o. female 22w4d    [id number]    OB History    Para Term  AB Living   4 2 1 1 1 2   SAB TAB Ectopic Molar Multiple Live Births   1       0 2      # Outcome Date GA Lbr Chema/2nd Weight Sex Delivery Anes PTL Lv   4 Current            3 Term 18 38w3d 00:48 / 00:43 7 lb 6.9 oz (3.37 kg) F Vag-Spont EPI N CASTILLO   2  10/18/11 28w0d  3 lb 9 oz (1.616 kg) F Vag-Spont EPI Y CASTILLO      Complications: Placenta previa, Placental abruption in third trimester   1 SAB  13w0d             Birth Comments: had d&c       Chief Complaint   Patient presents with    Routine Prenatal Visit     Patient is 22 weeks and 4 days gestation and is here for supervision of high risk pregnancy.  High Risk Pregnancy     Previous  delivery, Short cervix        Blood pressure 126/75, pulse 90, temperature 97.7 °F (36.5 °C), temperature source Temporal, weight 193 lb 9.6 oz (87.8 kg), last menstrual period 10/15/2020, not currently breastfeeding. The patient was seen and evaluated. There was positive fetal movements. No contractions or leakage of fluid. Signs and symptoms of  labor as well as labor were reviewed. The patient's anatomy ultrasound has been completed and reviewed with patient. The S/S of Pre-Eclampsia were reviewed with the patient in detail. She is to report any of these if they occur. She currently denies any of these. The patient is RH positive Rhogam Ordered: Not indicated.     Patient Active Problem List    Diagnosis Date Noted    22 weeks gestation of pregnancy 2021    Vaz Cerclage placed 3/17/21 2021     #5 Ethibond suture, knot @ 12      Incompetent cervix     21 weeks gestation of pregnancy 03/10/2021    Bilateral lower extremity edema 03/10/2021    History of cervical cerclage, currently pregnant, second trimester 2020    History of gestational hypertension 2020    S/P Vaz Cervical cerclage placed 18, removed 2018     Knot at 12 o'clock      Noncompliance 2018    Poor historian 2018    HRP (high risk pregnancy), second trimester 2018    Short cervical length during pregnancy in second trimester 2018     16mm CL found 18 in office  17 mm CL found 18 in office. Patient currently taking Clarisse Diamond H/O PTD (G2 @ 28w) 05/15/2018        Diagnosis Orders   1. History of  delivery, currently pregnant in second trimester  HYDROXYprogesterone caproate oil injection 250 mg   2. 22 weeks gestation of pregnancy     3. HRP (high risk pregnancy), second trimester     4. Incompetent cervix         Patient post cervical cerclage doing well. Fetal heart rate auscultated at 149 bpm and fundal height is 22 cm. today. Patient advised to continue taking prenatal vitamins and to rest as necessary. Patient is not working at this time and she was advised to continue off work until following delivery and completion of postpartum period. Short term disability paperwork will be completed for patient. The patient will return to the office for her next visit in 1 week. See antepartum flow sheet. Abdulkadir Mirza am scribing for, and in the presence of Dr. Osmar Peter. Electronically signed by: Betsy Holland 3/19/21 9:33 AM   I agree to the above documentation placed by my scribe Betsy Holland. I reviewed the scribe's note and agree with the documented findings and plan of care. Any areas of disagreement are noted on the chart. I have personally evaluated this patient. Additional findings are as noted. I agree with the chief complaint, past medical history, past surgical history, allergies, medications, social and family history as documented unless otherwise noted below.      Electronically signed by Osmar Peter MD on 3/20/2021 at 2:23 AM

## 2021-03-19 NOTE — PROGRESS NOTES
Patient was given 17P in the Right Gluteus Tre. NDC# n/a  LOT# [id number]  Exp date- 04/28/2021  Patient's last injection was 03/10/2021. Patient tolerated well without difficulty.

## 2021-03-24 ENCOUNTER — ROUTINE PRENATAL (OUTPATIENT)
Dept: OBGYN CLINIC | Age: 31
End: 2021-03-24
Payer: COMMERCIAL

## 2021-03-24 ENCOUNTER — ROUTINE PRENATAL (OUTPATIENT)
Dept: OBGYN CLINIC | Age: 31
End: 2021-03-24

## 2021-03-24 VITALS
DIASTOLIC BLOOD PRESSURE: 75 MMHG | SYSTOLIC BLOOD PRESSURE: 122 MMHG | HEART RATE: 91 BPM | BODY MASS INDEX: 33.13 KG/M2 | WEIGHT: 193 LBS

## 2021-03-24 DIAGNOSIS — O26.872 SHORT CERVICAL LENGTH DURING PREGNANCY IN SECOND TRIMESTER: ICD-10-CM

## 2021-03-24 DIAGNOSIS — O26.892 HEARTBURN IN PREGNANCY IN SECOND TRIMESTER: Primary | ICD-10-CM

## 2021-03-24 DIAGNOSIS — O09.92 HRP (HIGH RISK PREGNANCY), SECOND TRIMESTER: ICD-10-CM

## 2021-03-24 DIAGNOSIS — O09.212 PREVIOUS PRETERM DELIVERY IN SECOND TRIMESTER, ANTEPARTUM: ICD-10-CM

## 2021-03-24 DIAGNOSIS — O09.92 HRP (HIGH RISK PREGNANCY), SECOND TRIMESTER: Primary | ICD-10-CM

## 2021-03-24 DIAGNOSIS — Z3A.23 23 WEEKS GESTATION OF PREGNANCY: ICD-10-CM

## 2021-03-24 DIAGNOSIS — R12 HEARTBURN IN PREGNANCY IN SECOND TRIMESTER: Primary | ICD-10-CM

## 2021-03-24 DIAGNOSIS — O34.30 CERVICAL CERCLAGE SUTURE PRESENT, ANTEPARTUM: ICD-10-CM

## 2021-03-24 DIAGNOSIS — O09.892 HISTORY OF PRETERM DELIVERY, CURRENTLY PREGNANT IN SECOND TRIMESTER: ICD-10-CM

## 2021-03-24 PROCEDURE — 0502F SUBSEQUENT PRENATAL CARE: CPT | Performed by: SPECIALIST

## 2021-03-24 PROCEDURE — 0502F SUBSEQUENT PRENATAL CARE: CPT | Performed by: CLINICAL NURSE SPECIALIST

## 2021-03-24 PROCEDURE — 76817 TRANSVAGINAL US OBSTETRIC: CPT | Performed by: SPECIALIST

## 2021-03-24 PROCEDURE — 96372 THER/PROPH/DIAG INJ SC/IM: CPT | Performed by: CLINICAL NURSE SPECIALIST

## 2021-03-24 RX ORDER — HYDROXYPROGESTERONE CAPROATE 250 MG/ML
1 INJECTION INTRAMUSCULAR ONCE
Status: COMPLETED | OUTPATIENT
Start: 2021-03-24 | End: 2021-03-24

## 2021-03-24 RX ORDER — FAMOTIDINE 20 MG/1
20 TABLET, FILM COATED ORAL 2 TIMES DAILY
Qty: 60 TABLET | Refills: 3 | Status: SHIPPED | OUTPATIENT
Start: 2021-03-24 | End: 2021-08-25

## 2021-03-24 RX ADMIN — HYDROXYPROGESTERONE CAPROATE 1 ML: 250 INJECTION INTRAMUSCULAR at 10:25

## 2021-03-24 NOTE — PROGRESS NOTES
Austin Almeida is a 32 y.o. female 23w2d    [id number]    OB History    Para Term  AB Living   4 2 1 1 1 2   SAB TAB Ectopic Molar Multiple Live Births   1       0 2      # Outcome Date GA Lbr Chema/2nd Weight Sex Delivery Anes PTL Lv   4 Current            3 Term 18 38w3d 00:48 / 00:43 7 lb 6.9 oz (3.37 kg) F Vag-Spont EPI N CASTILLO   2  10/18/11 28w0d  3 lb 9 oz (1.616 kg) F Vag-Spont EPI Y CASTILLO      Complications: Placenta previa, Placental abruption in third trimester   1 SAB  13w0d             Birth Comments: had d&c         Last menstrual period 10/15/2020, not currently breastfeeding. The patient was seen and evaluated. There was positive fetal movements. No contractions or leakage of fluid. Signs and symptoms of  labor as well as labor were reviewed. The patients anatomy ultrasound has been completed and reviewed with patient. A 28 week lab panel was ordered. This includes a (CBC, 1 hr GTT). The patient is to complete this in the next two to four weeks. The S/S of Pre-Eclampsia were reviewed with the patient in detail. She is to report any of these if they occur. She currently denies any of these. The patient is RH positive Rhogam Ordered: Not due at this time.     Patient Active Problem List    Diagnosis Date Noted    22 weeks gestation of pregnancy 2021    Vaz Cerclage placed 3/17/21 2021     #5 Ethibond suture, knot @ 12      Incompetent cervix     21 weeks gestation of pregnancy 03/10/2021    Bilateral lower extremity edema 03/10/2021    History of cervical cerclage, currently pregnant, second trimester 2020    History of gestational hypertension 2020    S/P Vaz Cervical cerclage placed 18, removed 2018     Knot at 12 o'clock      Noncompliance 2018    Poor historian 2018    HRP (high risk pregnancy), second trimester 2018    Short cervical length during pregnancy in second trimester 05/31/2018     16mm CL found 8/23/18 in office  17 mm CL found 5/31/18 in office. Patient currently taking Renato Lard H/O PTD (G2 @ 28w) 05/15/2018        Diagnosis Orders   1. HRP (high risk pregnancy), second trimester     2. 23 weeks gestation of pregnancy     3. Vaz Cerclage placed 3/17/21     4. Short cervical length during pregnancy in second trimester     5. H/O PTD (G2 @ 28w)     Continue prenatal vitamins, increase water intake and frequent rest periods as needed. The patient will return to the office for her next visit in 1 weeks. See antepartum flow sheet.      Electronically signed by: Agatha Ferreira CNP

## 2021-03-24 NOTE — PATIENT INSTRUCTIONS
Patient Education        Learning About When to Call Your Doctor During Pregnancy (After 20 Weeks)  Your Care Instructions  It's common to have concerns about what might be a problem during pregnancy. Although most pregnant women don't have any serious problems, it's important to know when to call your doctor if you have certain symptoms or signs of labor. These are general suggestions. Your doctor may give you some more information about when to call. When to call your doctor (after 20 weeks)  Call 911 anytime you think you may need emergency care. For example, call if:  · You have severe vaginal bleeding. · You have sudden, severe pain in your belly. · You passed out (lost consciousness). · You have a seizure. · You see or feel the umbilical cord. · You think you are about to deliver your baby and can't make it safely to the hospital.  Call your doctor now or seek immediate medical care if:  · You have vaginal bleeding. · You have belly pain. · You have a fever. · You have symptoms of preeclampsia, such as:  ? Sudden swelling of your face, hands, or feet. ? New vision problems (such as dimness, blurring, or seeing spots). ? A severe headache. · You have a sudden release of fluid from your vagina. (You think your water broke.)  · You think that you may be in labor. This means that you've had at least 6 contractions in an hour. · You notice that your baby has stopped moving or is moving much less than normal.  · You have symptoms of a urinary tract infection. These may include:  ? Pain or burning when you urinate. ? A frequent need to urinate without being able to pass much urine. ? Pain in the flank, which is just below the rib cage and above the waist on either side of the back. ? Blood in your urine. Watch closely for changes in your health, and be sure to contact your doctor if:  · You have vaginal discharge that smells bad. · You have skin changes, such as:  ? A rash. ? Itching.   ? Yellow color to your skin. · You have other concerns about your pregnancy. If you have labor signs at 37 weeks or more  If you have signs of labor at 37 weeks or more, your doctor may tell you to call when your labor becomes more active. Symptoms of active labor include:  · Contractions that are regular. · Contractions that are less than 5 minutes apart. · Contractions that are hard to talk through. Follow-up care is a key part of your treatment and safety. Be sure to make and go to all appointments, and call your doctor if you are having problems. It's also a good idea to know your test results and keep a list of the medicines you take. Where can you learn more? Go to [email or web address] org and sign in to your Virtual [employer] account. Enter  in the [employer] box to learn more about \"Learning About When to Call Your Doctor During Pregnancy (After 20 Weeks). \"     If you do not have an account, please click on the \"Sign Up Now\" link. Current as of: October 8, 2020               Content Version: 12.8  © 4412-9290 Healthwise, Incorporated. Care instructions adapted under license by [facility] ([facility]). If you have questions about a medical condition or this instruction, always ask your healthcare professional. [address] any warranty or liability for your use of this information.

## 2021-03-31 ENCOUNTER — ROUTINE PRENATAL (OUTPATIENT)
Dept: OBGYN CLINIC | Age: 31
End: 2021-03-31
Payer: COMMERCIAL

## 2021-03-31 VITALS
BODY MASS INDEX: 33.36 KG/M2 | SYSTOLIC BLOOD PRESSURE: 123 MMHG | HEIGHT: 64 IN | WEIGHT: 195.4 LBS | HEART RATE: 87 BPM | DIASTOLIC BLOOD PRESSURE: 89 MMHG | TEMPERATURE: 97.5 F

## 2021-03-31 DIAGNOSIS — O26.872 SHORT CERVICAL LENGTH DURING PREGNANCY IN SECOND TRIMESTER: ICD-10-CM

## 2021-03-31 DIAGNOSIS — Z3A.24 24 WEEKS GESTATION OF PREGNANCY: ICD-10-CM

## 2021-03-31 DIAGNOSIS — N88.3 INCOMPETENT CERVIX: ICD-10-CM

## 2021-03-31 DIAGNOSIS — Z98.890 HISTORY OF CERVICAL CERCLAGE, CURRENTLY PREGNANT, SECOND TRIMESTER: ICD-10-CM

## 2021-03-31 DIAGNOSIS — O09.92 HRP (HIGH RISK PREGNANCY), SECOND TRIMESTER: Primary | ICD-10-CM

## 2021-03-31 DIAGNOSIS — O09.292 HISTORY OF CERVICAL CERCLAGE, CURRENTLY PREGNANT, SECOND TRIMESTER: ICD-10-CM

## 2021-03-31 DIAGNOSIS — O09.212 PREVIOUS PRETERM DELIVERY IN SECOND TRIMESTER, ANTEPARTUM: ICD-10-CM

## 2021-03-31 DIAGNOSIS — O34.30 CERVICAL CERCLAGE SUTURE PRESENT, ANTEPARTUM: ICD-10-CM

## 2021-03-31 PROBLEM — Z3A.22 22 WEEKS GESTATION OF PREGNANCY: Status: RESOLVED | Noted: 2021-03-17 | Resolved: 2021-03-31

## 2021-03-31 PROBLEM — Z3A.21 21 WEEKS GESTATION OF PREGNANCY: Status: RESOLVED | Noted: 2021-03-10 | Resolved: 2021-03-31

## 2021-03-31 PROCEDURE — 96372 THER/PROPH/DIAG INJ SC/IM: CPT | Performed by: CLINICAL NURSE SPECIALIST

## 2021-03-31 PROCEDURE — 0502F SUBSEQUENT PRENATAL CARE: CPT | Performed by: CLINICAL NURSE SPECIALIST

## 2021-03-31 RX ORDER — HYDROXYPROGESTERONE CAPROATE 250 MG/ML
250 INJECTION INTRAMUSCULAR
Status: DISCONTINUED | OUTPATIENT
Start: 2021-03-31 | End: 2021-04-06

## 2021-03-31 RX ADMIN — HYDROXYPROGESTERONE CAPROATE 250 MG: 250 INJECTION INTRAMUSCULAR at 16:47

## 2021-03-31 NOTE — PROGRESS NOTES
Sindy Verma is a 32 y.o. female 24w2d    [id number]    OB History    Para Term  AB Living   4 2 1 1 1 2   SAB TAB Ectopic Molar Multiple Live Births   1       0 2      # Outcome Date GA Lbr Chema/2nd Weight Sex Delivery Anes PTL Lv   4 Current            3 Term 18 38w3d 00:48 / 00:43 7 lb 6.9 oz (3.37 kg) F Vag-Spont EPI N CASTILLO   2  10/18/11 28w0d  3 lb 9 oz (1.616 kg) F Vag-Spont EPI Y CASTILLO      Complications: Placenta previa, Placental abruption in third trimester   1 SAB  13w0d             Birth Comments: had d&c         Blood pressure 123/89, pulse 87, temperature 97.5 °F (36.4 °C), temperature source Temporal, height 5' 4\" (1.626 m), weight 195 lb 6.4 oz (88.6 kg), last menstrual period 10/15/2020, not currently breastfeeding. The patient was seen and evaluated. There was positive fetal movements. No contractions or leakage of fluid. Signs and symptoms of  labor as well as labor were reviewed. The patients anatomy ultrasound has been completed and reviewed with patient. A 28 week lab panel was ordered. This includes a (CBC, 1 hr GTT). The patient is to complete this in the next two to four weeks. The S/S of Pre-Eclampsia were reviewed with the patient in detail. She is to report any of these if they occur. She currently denies any of these. The patient is RH positive Rhogam Ordered: Not due at this time.     Patient Active Problem List    Diagnosis Date Noted    24 weeks gestation of pregnancy 2021    Vza Cerclage placed 3/17/21 2021     #5 Ethibond suture, knot @ 12      Incompetent cervix     Bilateral lower extremity edema 03/10/2021    History of cervical cerclage, currently pregnant, second trimester 2020    History of gestational hypertension 2020    S/P Vaz Cervical cerclage placed 18, removed 2018     Knot at 12 o'clock      Noncompliance 2018    Poor historian 2018    HRP (high risk pregnancy), second trimester 05/31/2018    Short cervical length during pregnancy in second trimester 05/31/2018     16mm CL found 8/23/18 in office  17 mm CL found 5/31/18 in office. Patient currently taking Brown Fickle H/O PTD (G2 @ 28w) 05/15/2018        Diagnosis Orders   1. HRP (high risk pregnancy), second trimester  HYDROXYprogesterone caproate oil injection 250 mg   2. 24 weeks gestation of pregnancy     3. H/O PTD (G2 @ 28w)     4. Short cervical length during pregnancy in second trimester     5. History of cervical cerclage, currently pregnant, second trimester  HYDROXYprogesterone caproate oil injection 250 mg   6. Incompetent cervix     7. Vaz Cerclage placed 3/17/21     Continue prenatal vitamins, increase water intake and frequent rest periods as needed. The patient will return to the office for her next visit in 1 weeks. See antepartum flow sheet.      Electronically signed by: Rhina Salinas CNP

## 2021-04-06 RX ORDER — HYDROXYPROGESTERONE CAPROATE 250 MG/ML
250 INJECTION INTRAMUSCULAR ONCE
Status: COMPLETED | OUTPATIENT
Start: 2021-04-06 | End: 2021-04-07

## 2021-04-07 ENCOUNTER — ROUTINE PRENATAL (OUTPATIENT)
Dept: OBGYN CLINIC | Age: 31
End: 2021-04-07
Payer: COMMERCIAL

## 2021-04-07 VITALS
BODY MASS INDEX: 34.26 KG/M2 | WEIGHT: 199.6 LBS | HEART RATE: 89 BPM | DIASTOLIC BLOOD PRESSURE: 80 MMHG | SYSTOLIC BLOOD PRESSURE: 135 MMHG

## 2021-04-07 DIAGNOSIS — Z3A.25 25 WEEKS GESTATION OF PREGNANCY: Primary | ICD-10-CM

## 2021-04-07 DIAGNOSIS — O26.872 SHORT CERVICAL LENGTH DURING PREGNANCY IN SECOND TRIMESTER: ICD-10-CM

## 2021-04-07 DIAGNOSIS — O09.92 HRP (HIGH RISK PREGNANCY), SECOND TRIMESTER: ICD-10-CM

## 2021-04-07 DIAGNOSIS — O34.30 CERVICAL CERCLAGE SUTURE PRESENT, ANTEPARTUM: ICD-10-CM

## 2021-04-07 PROCEDURE — 96372 THER/PROPH/DIAG INJ SC/IM: CPT | Performed by: SPECIALIST

## 2021-04-07 PROCEDURE — 0502F SUBSEQUENT PRENATAL CARE: CPT | Performed by: SPECIALIST

## 2021-04-07 RX ADMIN — HYDROXYPROGESTERONE CAPROATE 250 MG: 250 INJECTION INTRAMUSCULAR at 14:00

## 2021-04-12 ENCOUNTER — ROUTINE PRENATAL (OUTPATIENT)
Dept: OBGYN CLINIC | Age: 31
End: 2021-04-12
Payer: COMMERCIAL

## 2021-04-12 VITALS
SYSTOLIC BLOOD PRESSURE: 150 MMHG | DIASTOLIC BLOOD PRESSURE: 88 MMHG | WEIGHT: 199 LBS | HEART RATE: 96 BPM | BODY MASS INDEX: 34.16 KG/M2

## 2021-04-12 DIAGNOSIS — O16.2 HYPERTENSION AFFECTING PREGNANCY IN SECOND TRIMESTER: ICD-10-CM

## 2021-04-12 DIAGNOSIS — Z3A.26 26 WEEKS GESTATION OF PREGNANCY: ICD-10-CM

## 2021-04-12 DIAGNOSIS — O09.92 HRP (HIGH RISK PREGNANCY), SECOND TRIMESTER: Primary | ICD-10-CM

## 2021-04-12 DIAGNOSIS — O34.32 CERVICAL CERCLAGE SUTURE PRESENT IN SECOND TRIMESTER: ICD-10-CM

## 2021-04-12 PROCEDURE — 0502F SUBSEQUENT PRENATAL CARE: CPT | Performed by: SPECIALIST

## 2021-04-12 PROCEDURE — 96372 THER/PROPH/DIAG INJ SC/IM: CPT | Performed by: SPECIALIST

## 2021-04-12 RX ORDER — ASPIRIN 81 MG/1
81 TABLET ORAL DAILY
Qty: 90 TABLET | Refills: 1 | Status: SHIPPED | OUTPATIENT
Start: 2021-04-12 | End: 2021-08-25

## 2021-04-12 RX ORDER — LABETALOL 100 MG/1
100 TABLET, FILM COATED ORAL 2 TIMES DAILY
Qty: 30 TABLET | Refills: 1 | Status: SHIPPED | OUTPATIENT
Start: 2021-04-12 | End: 2021-05-17

## 2021-04-12 RX ORDER — HYDROXYPROGESTERONE CAPROATE 250 MG/ML
1 INJECTION INTRAMUSCULAR ONCE
Status: COMPLETED | OUTPATIENT
Start: 2021-04-12 | End: 2021-04-12

## 2021-04-12 RX ADMIN — HYDROXYPROGESTERONE CAPROATE 1 ML: 250 INJECTION INTRAMUSCULAR at 10:30

## 2021-04-12 NOTE — PROGRESS NOTES
Mariely Russ is a 32 y.o. female 34w0d    [id number]    OB History    Para Term  AB Living   4 2 1 1 1 2   SAB TAB Ectopic Molar Multiple Live Births   1       0 2      # Outcome Date GA Lbr Chema/2nd Weight Sex Delivery Anes PTL Lv   4 Current            3 Term 18 38w3d 00:48 / 00:43 7 lb 6.9 oz (3.37 kg) F Vag-Spont EPI N CASTILLO   2  10/18/11 28w0d  3 lb 9 oz (1.616 kg) F Vag-Spont EPI Y CASTILLO      Complications: Placenta previa, Placental abruption in third trimester   1 SAB  13w0d             Birth Comments: had d&c       Chief Complaint   Patient presents with    High Risk Pregnancy        Blood pressure (!) 150/88, pulse 96, weight 199 lb (90.3 kg), last menstrual period 10/15/2020, not currently breastfeeding. The patient was seen and evaluated. There was positive fetal movements. No contractions or leakage of fluid. Signs and symptoms of  labor as well as labor were reviewed. The patient's anatomy ultrasound has been completed and reviewed with patient. The S/S of Pre-Eclampsia were reviewed with the patient in detail. She is to report any of these if they occur. She currently denies any of these. The patient is RH positive Rhogam Ordered: Not indicated.     Patient Active Problem List    Diagnosis Date Noted    Hypertension affecting pregnancy in second trimester 2021    25 weeks gestation of pregnancy 2021    24 weeks gestation of pregnancy 2021    Vaz Cerclage placed 3/17/21 2021     #5 Ethibond suture, knot @ 12      Incompetent cervix     Bilateral lower extremity edema 03/10/2021    History of cervical cerclage, currently pregnant, second trimester 2020    History of gestational hypertension 2020    S/P Vaz Cervical cerclage placed 18, removed 2018     Knot at 12 o'clock      Noncompliance 2018    Poor historian 2018    HRP (high risk pregnancy), second trimester 2018    Short cervical length during pregnancy in second trimester 05/31/2018     16mm CL found 8/23/18 in office  17 mm CL found 5/31/18 in office. Patient currently taking Robbin Sheffield H/O PTD (G2 @ 28w) 05/15/2018        Diagnosis Orders   1. HRP (high risk pregnancy), second trimester  HYDROXYprogesterone caproate oil injection 1 mL    Protein, 24 Hr Urine   2. 26 weeks gestation of pregnancy  HYDROXYprogesterone caproate oil injection 1 mL   3. Cervical cerclage suture present in second trimester  HYDROXYprogesterone caproate oil injection 1 mL   4. Hypertension affecting pregnancy in second trimester  Protein, 24 Hr Urine     Orders Placed This Encounter   Medications    HYDROXYprogesterone caproate oil injection 1 mL    labetalol (NORMODYNE) 100 MG tablet     Sig: Take 1 tablet by mouth 2 times daily     Dispense:  30 tablet     Refill:  1    aspirin EC 81 MG EC tablet     Sig: Take 1 tablet by mouth daily     Dispense:  90 tablet     Refill:  1      Patient with gestational hypertension. Patient was told to take aspirin daily. Patient will also be treated with Labetalol. A 24 hour urine collection will be ordered today. Fetal heart rate auscultated at 144 bpm and fundal height is 28 cm. today. Patient advised to continue taking prenatal vitamins and to rest as necessary. Patient got the Protem and Protem Covid-19 vaccine last Friday. The patient will return to the office for her next visit in 1 week. See antepartum flow sheet. Germaine Horvath am scribing for, and in the presence of Dr. Ulla Habermann. Electronically signed by: Andrew Law 4/12/21 10:23 AM   I agree to the above documentation placed by my scribe Andrew Law. I reviewed the scribe's note and agree with the documented findings and plan of care. Any areas of disagreement are noted on the chart. I have personally evaluated this patient. Additional findings are as noted.  I agree with the chief complaint, past medical history, past surgical history, allergies, medications, social and family history as documented unless otherwise noted below.      Electronically signed by Lynn Woo MD on 4/12/2021 at 11:10 PM

## 2021-04-13 ENCOUNTER — TELEPHONE (OUTPATIENT)
Dept: OBGYN CLINIC | Age: 31
End: 2021-04-13

## 2021-04-19 ENCOUNTER — ROUTINE PRENATAL (OUTPATIENT)
Dept: OBGYN CLINIC | Age: 31
End: 2021-04-19

## 2021-04-19 ENCOUNTER — HOSPITAL ENCOUNTER (OUTPATIENT)
Age: 31
Discharge: HOME | End: 2021-04-19
Payer: COMMERCIAL

## 2021-04-19 VITALS
BODY MASS INDEX: 34.5 KG/M2 | DIASTOLIC BLOOD PRESSURE: 76 MMHG | SYSTOLIC BLOOD PRESSURE: 127 MMHG | HEART RATE: 91 BPM | WEIGHT: 201 LBS

## 2021-04-19 DIAGNOSIS — O09.92 HRP (HIGH RISK PREGNANCY), SECOND TRIMESTER: ICD-10-CM

## 2021-04-19 DIAGNOSIS — Z3A.27 27 WEEKS GESTATION OF PREGNANCY: ICD-10-CM

## 2021-04-19 DIAGNOSIS — O09.292 HISTORY OF CERVICAL CERCLAGE, CURRENTLY PREGNANT, SECOND TRIMESTER: Primary | ICD-10-CM

## 2021-04-19 DIAGNOSIS — Z98.890 HISTORY OF CERVICAL CERCLAGE, CURRENTLY PREGNANT, SECOND TRIMESTER: Primary | ICD-10-CM

## 2021-04-19 DIAGNOSIS — O16.2 HYPERTENSION AFFECTING PREGNANCY IN SECOND TRIMESTER: ICD-10-CM

## 2021-04-19 LAB
HOURS COLLECTED: 24 H
PROTEIN 24 HOUR URINE: 190 MG/24 H
PROTEIN,TOT TIMED UR: ABNORMAL MG/X H
URINE TOTAL PROTEIN: 5 MG/DL
VOLUME: 3800 ML

## 2021-04-19 PROCEDURE — 0502F SUBSEQUENT PRENATAL CARE: CPT | Performed by: SPECIALIST

## 2021-04-19 PROCEDURE — 81050 URINALYSIS VOLUME MEASURE: CPT

## 2021-04-19 PROCEDURE — 84156 ASSAY OF PROTEIN URINE: CPT

## 2021-04-19 RX ORDER — HYDROXYPROGESTERONE CAPROATE 250 MG/ML
250 INJECTION INTRAMUSCULAR ONCE
Status: COMPLETED | OUTPATIENT
Start: 2021-04-19 | End: 2021-04-19

## 2021-04-19 RX ORDER — HYDROXYPROGESTERONE CAPROATE 250 MG/ML
250 INJECTION INTRAMUSCULAR
Status: DISCONTINUED | OUTPATIENT
Start: 2021-04-19 | End: 2021-04-19

## 2021-04-19 RX ADMIN — HYDROXYPROGESTERONE CAPROATE 250 MG: 250 INJECTION INTRAMUSCULAR at 13:10

## 2021-04-19 NOTE — PROGRESS NOTES
3/17/21 03/17/2021     #5 Ethibond suture, knot @ 12      Incompetent cervix     Bilateral lower extremity edema 03/10/2021    History of cervical cerclage, currently pregnant, second trimester 12/30/2020    History of gestational hypertension 12/30/2020    S/P Vaz Cervical cerclage placed 6/1/18, removed 9/13 08/13/2018     Knot at 12 o'clock      Noncompliance 08/13/2018    Poor historian 08/13/2018    HRP (high risk pregnancy), second trimester 05/31/2018    Short cervical length during pregnancy in second trimester 05/31/2018     16mm CL found 8/23/18 in office  17 mm CL found 5/31/18 in office. Patient currently taking Oziel Fruits H/O PTD (G2 @ 28w) 05/15/2018        Diagnosis Orders   1. History of cervical cerclage, currently pregnant, second trimester  HYDROXYprogesterone caproate oil injection 250 mg   2. 27 weeks gestation of pregnancy     3. HRP (high risk pregnancy), second trimester     4. Hypertension affecting pregnancy in second trimester         Patient doing well. She denies any vaginal bleeding and cramping. She is scheduled for cervical length on Wednesday. She states that since she started blood pressure medication, she has not had migraines. Fetal heart rate ausculted at 142 bpm and fundal height is 28 cm. today. She is in the process of completing 24 hour urine and needs another container. She states that she is drinking a lot of water. Patient advised to continue taking prenatal vitamins and to rest as necessary. The patient will return to the office for her next visit in 1 week. See antepartum flow sheet. James Hobbs am scribing for, and in the presence of Dr. Chuy Gusman. Electronically signed by: Marva Miller 4/19/21 10:18 AM   I agree to the above documentation placed by my scribe Marva Miller. I reviewed the scribe's note and agree with the documented findings and plan of care. Any areas of disagreement are noted on the chart.    I have personally evaluated this patient. Additional findings are as noted. I agree with the chief complaint, past medical history, past surgical history, allergies, medications, social and family history as documented unless otherwise noted below.      Electronically signed by Paulina Felix MD on 4/20/2021 at 5:20 AM

## 2021-04-19 NOTE — PROGRESS NOTES
Patient was given 17P in the left dorsogluteal .  NDC# N/A  LOT# [id number]  Exp date- 04/28/2021  Patient's last injection was 04/12/2021. Patient's last annual exam was on N/A. Patient tolerated well without difficulty.

## 2021-04-26 ENCOUNTER — HOSPITAL ENCOUNTER (OUTPATIENT)
Age: 31
Setting detail: SPECIMEN
Discharge: HOME | End: 2021-04-26
Payer: COMMERCIAL

## 2021-04-26 ENCOUNTER — ROUTINE PRENATAL (OUTPATIENT)
Dept: OBGYN CLINIC | Age: 31
End: 2021-04-26
Payer: COMMERCIAL

## 2021-04-26 VITALS
WEIGHT: 202 LBS | SYSTOLIC BLOOD PRESSURE: 134 MMHG | DIASTOLIC BLOOD PRESSURE: 81 MMHG | BODY MASS INDEX: 34.67 KG/M2 | HEART RATE: 96 BPM

## 2021-04-26 DIAGNOSIS — O34.30 CERVICAL CERCLAGE SUTURE PRESENT, ANTEPARTUM: ICD-10-CM

## 2021-04-26 DIAGNOSIS — Z3A.28 28 WEEKS GESTATION OF PREGNANCY: ICD-10-CM

## 2021-04-26 DIAGNOSIS — O09.93 HRP (HIGH RISK PREGNANCY), THIRD TRIMESTER: ICD-10-CM

## 2021-04-26 DIAGNOSIS — Z23 NEED FOR TDAP VACCINATION: ICD-10-CM

## 2021-04-26 DIAGNOSIS — O09.93 HRP (HIGH RISK PREGNANCY), THIRD TRIMESTER: Primary | ICD-10-CM

## 2021-04-26 LAB
ABSOLUTE EOS #: 0.29 K/UL (ref 0–0.44)
ABSOLUTE IMMATURE GRANULOCYTE: 0.14 K/UL (ref 0–0.3)
ABSOLUTE LYMPH #: 2.37 K/UL (ref 1.1–3.7)
ABSOLUTE MONO #: 0.5 K/UL (ref 0.1–1.2)
BASOPHILS # BLD: 1 % (ref 0–2)
BASOPHILS ABSOLUTE: 0.07 K/UL (ref 0–0.2)
DIFFERENTIAL TYPE: ABNORMAL
EOSINOPHILS RELATIVE PERCENT: 2 % (ref 1–4)
GLUCOSE ADMINISTRATION: ABNORMAL
GLUCOSE TOLERANCE SCREEN 50G: 201 MG/DL (ref 70–135)
HCT VFR BLD CALC: 36.5 % (ref 36.3–47.1)
HEMOGLOBIN: 11.5 G/DL (ref 11.9–15.1)
IMMATURE GRANULOCYTES: 1 %
LYMPHOCYTES # BLD: 17 % (ref 24–43)
MCH RBC QN AUTO: 31.4 PG (ref 25.2–33.5)
MCHC RBC AUTO-ENTMCNC: 31.5 G/DL (ref 28.4–34.8)
MCV RBC AUTO: 99.7 FL (ref 82.6–102.9)
MONOCYTES # BLD: 4 % (ref 3–12)
NRBC AUTOMATED: 0 PER 100 WBC
PDW BLD-RTO: 13.2 % (ref 11.8–14.4)
PLATELET # BLD: 288 K/UL (ref 138–453)
PLATELET ESTIMATE: ABNORMAL
PMV BLD AUTO: 11.7 FL (ref 8.1–13.5)
RBC # BLD: 3.66 M/UL (ref 3.95–5.11)
RBC # BLD: ABNORMAL 10*6/UL
SEG NEUTROPHILS: 75 % (ref 36–65)
SEGMENTED NEUTROPHILS ABSOLUTE COUNT: 10.23 K/UL (ref 1.5–8.1)
WBC # BLD: 13.6 K/UL (ref 3.5–11.3)
WBC # BLD: ABNORMAL 10*3/UL

## 2021-04-26 PROCEDURE — 90715 TDAP VACCINE 7 YRS/> IM: CPT | Performed by: SPECIALIST

## 2021-04-26 PROCEDURE — 0502F SUBSEQUENT PRENATAL CARE: CPT | Performed by: SPECIALIST

## 2021-04-26 PROCEDURE — 90471 IMMUNIZATION ADMIN: CPT | Performed by: SPECIALIST

## 2021-04-26 PROCEDURE — 96372 THER/PROPH/DIAG INJ SC/IM: CPT | Performed by: SPECIALIST

## 2021-04-26 RX ORDER — HYDROXYPROGESTERONE CAPROATE 250 MG/ML
1 INJECTION INTRAMUSCULAR ONCE
Status: COMPLETED | OUTPATIENT
Start: 2021-04-26 | End: 2021-04-26

## 2021-04-26 RX ADMIN — HYDROXYPROGESTERONE CAPROATE 1 ML: 250 INJECTION INTRAMUSCULAR at 10:34

## 2021-04-26 NOTE — PROGRESS NOTES
Patient was in the office today for a cbc. 1hr gtt   Draw per physician order using sterile technique.   Drawn from the r antecubital

## 2021-04-26 NOTE — PROGRESS NOTES
Gideon Ansari is a 32 y.o. female 28w0d    [id number]    OB History    Para Term  AB Living   4 2 1 1 1 2   SAB TAB Ectopic Molar Multiple Live Births   1       0 2      # Outcome Date GA Lbr Chema/2nd Weight Sex Delivery Anes PTL Lv   4 Current            3 Term 18 38w3d 00:48 / 00:43 7 lb 6.9 oz (3.37 kg) F Vag-Spont EPI N CASTILLO   2  10/18/11 28w0d  3 lb 9 oz (1.616 kg) F Vag-Spont EPI Y CASTILLO      Complications: Placenta previa, Placental abruption in third trimester   1 SAB  13w0d             Birth Comments: had d&c       Chief Complaint   Patient presents with    High Risk Pregnancy        Blood pressure 134/81, pulse 96, weight 202 lb (91.6 kg), last menstrual period 10/15/2020, not currently breastfeeding. The patient was seen and evaluated. There was positive fetal movements. No contractions or leakage of fluid. Signs and symptoms of  labor as well as labor were reviewed. The S/S of Pre-Eclampsia were reviewed with the patient in detail. She is to report any of these if they occur. She currently denies any of these. The patient had her 28 week labs completed. The patient was instructed on fetal kick counts and was given a kick sheet to complete every 8 hours. She was instructed that the baby should move at a minimum of ten times within one hour after a meal. The patient was instructed to lay down on her left side twenty minutes after eating and count movements for up to one hour with a target value of ten movements. She was instructed to notify the office if she did not make that target after two attempts or if after any attempt there was less than four movements. The patient reports that the targets have been made Yes.     Patient Active Problem List    Diagnosis Date Noted    Need for Tdap vaccination 2021    28 weeks gestation of pregnancy 2021    Hypertension affecting pregnancy in second trimester 2021    25 weeks gestation of pregnancy 04/07/2021    24 weeks gestation of pregnancy 03/31/2021    Vaz Cerclage placed 3/17/21 03/17/2021     #5 Ethibond suture, knot @ 12      Incompetent cervix     Bilateral lower extremity edema 03/10/2021    History of cervical cerclage, currently pregnant, second trimester 12/30/2020    History of gestational hypertension 12/30/2020    S/P Vaz Cervical cerclage placed 6/1/18, removed 9/13 08/13/2018     Knot at 12 o'clock      Noncompliance 08/13/2018    Poor historian 08/13/2018    HRP (high risk pregnancy), third trimester 08/09/2018    HRP (high risk pregnancy), second trimester 05/31/2018    Short cervical length during pregnancy in second trimester 05/31/2018     16mm CL found 8/23/18 in office  17 mm CL found 5/31/18 in office. Patient currently taking Robbin Yohannes H/O PTD (G2 @ 28w) 05/15/2018        Diagnosis Orders   1. HRP (high risk pregnancy), third trimester  HYDROXYprogesterone caproate oil injection 1 mL   2. Need for Tdap vaccination  Tdap (age 10y-63y) IM (ADACEL)   3. 28 weeks gestation of pregnancy  Tdap (age 10y-63y) IM (ADACEL)    HYDROXYprogesterone caproate oil injection 1 mL   4. Vaz Cerclage placed 3/17/21  HYDROXYprogesterone caproate oil injection 1 mL       Patient doing well. She was advised to continue blood pressure medication. Discussed 24 hour protein with patient. She denies vaginal bleeding and cramping. Fetal heart rate ausculted at 138 bpm and fundal height is 29 cm. today. Patient advised to continue taking prenatal vitamins and to rest as necessary. The patient will return to the office for her next visit in 1 week. See antepartum flow sheet. Germaine Horvath am scribing for, and in the presence of Dr. Ulla Habermann. Electronically signed by: Andrew Law 4/26/21 10:29 AM   I agree to the above documentation placed by my scribe Andrew Law.  I reviewed the scribe's note and agree with the documented findings and plan of care. Any areas of disagreement are noted on the chart. I have personally evaluated this patient. Additional findings are as noted. I agree with the chief complaint, past medical history, past surgical history, allergies, medications, social and family history as documented unless otherwise noted below.      Electronically signed by Wliman Dominique MD on 4/26/2021 at 8:00 PM

## 2021-04-29 ENCOUNTER — TELEPHONE (OUTPATIENT)
Dept: OBGYN CLINIC | Age: 31
End: 2021-04-29

## 2021-04-29 ENCOUNTER — TELEPHONE (OUTPATIENT)
Dept: PERINATAL CARE | Age: 31
End: 2021-04-29

## 2021-04-29 DIAGNOSIS — R73.09 ABNORMAL GTT (GLUCOSE TOLERANCE TEST): Primary | ICD-10-CM

## 2021-04-29 DIAGNOSIS — O99.810 ABNORMAL MATERNAL GLUCOSE TOLERANCE, ANTEPARTUM: Primary | ICD-10-CM

## 2021-04-29 NOTE — TELEPHONE ENCOUNTER
Glucometer, Test, Strips, lancets alcohol swabs Testing four times daily one month supply x three refills. Script called into [facility] pharmacist [phone] Blayne Bazan

## 2021-04-29 NOTE — TELEPHONE ENCOUNTER
Patient notified of GTT results and that a referral was faxed to [facility] for evaluation.

## 2021-05-03 ENCOUNTER — ROUTINE PRENATAL (OUTPATIENT)
Dept: OBGYN CLINIC | Age: 31
End: 2021-05-03
Payer: COMMERCIAL

## 2021-05-03 VITALS
BODY MASS INDEX: 35.36 KG/M2 | DIASTOLIC BLOOD PRESSURE: 77 MMHG | HEART RATE: 97 BPM | WEIGHT: 206 LBS | SYSTOLIC BLOOD PRESSURE: 135 MMHG

## 2021-05-03 DIAGNOSIS — Z3A.29 29 WEEKS GESTATION OF PREGNANCY: ICD-10-CM

## 2021-05-03 DIAGNOSIS — O34.30 CERVICAL CERCLAGE SUTURE PRESENT, ANTEPARTUM: ICD-10-CM

## 2021-05-03 DIAGNOSIS — O09.93 HRP (HIGH RISK PREGNANCY), THIRD TRIMESTER: Primary | ICD-10-CM

## 2021-05-03 DIAGNOSIS — O26.872 SHORT CERVICAL LENGTH DURING PREGNANCY IN SECOND TRIMESTER: ICD-10-CM

## 2021-05-03 PROCEDURE — 0502F SUBSEQUENT PRENATAL CARE: CPT | Performed by: SPECIALIST

## 2021-05-03 PROCEDURE — 96372 THER/PROPH/DIAG INJ SC/IM: CPT | Performed by: SPECIALIST

## 2021-05-03 RX ORDER — HYDROXYPROGESTERONE CAPROATE 250 MG/ML
250 INJECTION INTRAMUSCULAR
Status: DISCONTINUED | OUTPATIENT
Start: 2021-05-03 | End: 2021-05-03

## 2021-05-03 RX ORDER — HYDROXYPROGESTERONE CAPROATE 250 MG/ML
1 INJECTION INTRAMUSCULAR ONCE
Status: COMPLETED | OUTPATIENT
Start: 2021-05-03 | End: 2021-05-03

## 2021-05-03 RX ADMIN — HYDROXYPROGESTERONE CAPROATE 1 ML: 250 INJECTION INTRAMUSCULAR at 12:05

## 2021-05-03 RX ADMIN — HYDROXYPROGESTERONE CAPROATE 250 MG: 250 INJECTION INTRAMUSCULAR at 10:51

## 2021-05-03 NOTE — PROGRESS NOTES
Arash Osorio is a 32 y.o. female 29w0d    [id number]    OB History    Para Term  AB Living   4 2 1 1 1 2   SAB TAB Ectopic Molar Multiple Live Births   1       0 2      # Outcome Date GA Lbr Chema/2nd Weight Sex Delivery Anes PTL Lv   4 Current            3 Term 18 38w3d 00:48 / 00:43 7 lb 6.9 oz (3.37 kg) F Vag-Spont EPI N CASTILLO   2  10/18/11 28w0d  3 lb 9 oz (1.616 kg) F Vag-Spont EPI Y CASTILLO      Complications: Placenta previa, Placental abruption in third trimester   1 SAB  13w0d             Birth Comments: had d&c       Chief Complaint   Patient presents with    High Risk Pregnancy        Blood pressure 135/77, pulse 97, weight 206 lb (93.4 kg), last menstrual period 10/15/2020, not currently breastfeeding. The patient was seen and evaluated. There was positive fetal movements. No contractions or leakage of fluid. Signs and symptoms of  labor as well as labor were reviewed. The S/S of Pre-Eclampsia were reviewed with the patient in detail. She is to report any of these if they occur. She currently denies any of these. The patient had her 28 week labs completed. The patient was instructed on fetal kick counts and was given a kick sheet to complete every 8 hours. She was instructed that the baby should move at a minimum of ten times within one hour after a meal. The patient was instructed to lay down on her left side twenty minutes after eating and count movements for up to one hour with a target value of ten movements. She was instructed to notify the office if she did not make that target after two attempts or if after any attempt there was less than four movements. The patient reports that the targets have been made Yes.     Patient Active Problem List    Diagnosis Date Noted    29 weeks gestation of pregnancy 2021    Need for Tdap vaccination 2021    28 weeks gestation of pregnancy 2021    Hypertension affecting pregnancy in second trimester 04/12/2021    25 weeks gestation of pregnancy 04/07/2021    24 weeks gestation of pregnancy 03/31/2021    Vaz Cerclage placed 3/17/21 03/17/2021     #5 Ethibond suture, knot @ 12      Incompetent cervix     Bilateral lower extremity edema 03/10/2021    History of cervical cerclage, currently pregnant, second trimester 12/30/2020    History of gestational hypertension 12/30/2020    S/P Vaz Cervical cerclage placed 6/1/18, removed 9/13 08/13/2018     Knot at 12 o'clock      Noncompliance 08/13/2018    Poor historian 08/13/2018    HRP (high risk pregnancy), third trimester 08/09/2018    HRP (high risk pregnancy), second trimester 05/31/2018    Short cervical length during pregnancy in second trimester 05/31/2018     16mm CL found 8/23/18 in office  17 mm CL found 5/31/18 in office. Patient currently taking Oziel Fruits H/O PTD (G2 @ 28w) 05/15/2018        Diagnosis Orders   1. HRP (high risk pregnancy), third trimester     2. 29 weeks gestation of pregnancy  HYDROXYprogesterone caproate oil injection 250 mg   3. Vaz Cerclage placed 3/17/21  HYDROXYprogesterone caproate oil injection 250 mg   4. Short cervical length during pregnancy in second trimester  HYDROXYprogesterone caproate oil injection 250 mg       Patient has been referred to [facility] for gestational diabetes. Patient states she has an appointment scheduled. Patient was told to limit her carbohydrate and sugar intake. Fetal heart rate ausculted at 150 bpm and fundal height is 30 cm. today. Patient advised to continue taking prenatal vitamins and to rest as necessary. The patient will return to the office for her next visit in 1 week. See antepartum flow sheet. James Hobbs am scribing for, and in the presence of Dr. Chuy Gusman. Electronically signed by: Marva Miller 5/3/21 10:33 AM   I agree to the above documentation placed by my scribe Marva Miller.  I reviewed the scribe's note and agree with the documented findings and plan of care. Any areas of disagreement are noted on the chart. I have personally evaluated this patient. Additional findings are as noted. I agree with the chief complaint, past medical history, past surgical history, allergies, medications, social and family history as documented unless otherwise noted below.      Electronically signed by Rommel Porter MD on 5/4/2021 at 3:12 AM

## 2021-05-10 ENCOUNTER — ROUTINE PRENATAL (OUTPATIENT)
Dept: PERINATAL CARE | Age: 31
End: 2021-05-10
Payer: COMMERCIAL

## 2021-05-10 VITALS
DIASTOLIC BLOOD PRESSURE: 66 MMHG | SYSTOLIC BLOOD PRESSURE: 136 MMHG | HEIGHT: 64 IN | HEART RATE: 96 BPM | WEIGHT: 206 LBS | BODY MASS INDEX: 35.17 KG/M2

## 2021-05-10 DIAGNOSIS — O09.213 CURRENT PREGNANCY WITH HISTORY OF PRE-TERM LABOR IN THIRD TRIMESTER: ICD-10-CM

## 2021-05-10 DIAGNOSIS — O99.213 OBESITY AFFECTING PREGNANCY IN THIRD TRIMESTER: ICD-10-CM

## 2021-05-10 DIAGNOSIS — O09.299 CURRENT SINGLETON PREGNANCY WITH HISTORY OF CONGENITAL ANOMALY IN PRIOR CHILD, ANTEPARTUM: ICD-10-CM

## 2021-05-10 DIAGNOSIS — O34.33 CERVICAL CERCLAGE SUTURE PRESENT IN THIRD TRIMESTER: ICD-10-CM

## 2021-05-10 DIAGNOSIS — O13.3 GESTATIONAL HYPERTENSION, THIRD TRIMESTER: Primary | ICD-10-CM

## 2021-05-10 DIAGNOSIS — O24.419 GESTATIONAL DIABETES MELLITUS (GDM), ANTEPARTUM, GESTATIONAL DIABETES METHOD OF CONTROL UNSPECIFIED: ICD-10-CM

## 2021-05-10 DIAGNOSIS — Z3A.30 30 WEEKS GESTATION OF PREGNANCY: ICD-10-CM

## 2021-05-10 LAB
ABDOMINAL CIRCUMFERENCE: NORMAL
BIPARIETAL DIAMETER: NORMAL
ESTIMATED FETAL WEIGHT: NORMAL
FEMORAL DIAMETER: NORMAL
HC/AC: NORMAL
HEAD CIRCUMFERENCE: NORMAL

## 2021-05-10 PROCEDURE — 76819 FETAL BIOPHYS PROFIL W/O NST: CPT | Performed by: OBSTETRICS & GYNECOLOGY

## 2021-05-10 PROCEDURE — 76820 UMBILICAL ARTERY ECHO: CPT | Performed by: OBSTETRICS & GYNECOLOGY

## 2021-05-10 PROCEDURE — 76811 OB US DETAILED SNGL FETUS: CPT | Performed by: OBSTETRICS & GYNECOLOGY

## 2021-05-10 PROCEDURE — 76817 TRANSVAGINAL US OBSTETRIC: CPT | Performed by: OBSTETRICS & GYNECOLOGY

## 2021-05-10 PROCEDURE — 99244 OFF/OP CNSLTJ NEW/EST MOD 40: CPT | Performed by: OBSTETRICS & GYNECOLOGY

## 2021-05-11 ENCOUNTER — HOSPITAL ENCOUNTER (OUTPATIENT)
Dept: DIABETES SERVICES | Age: 31
Setting detail: THERAPIES SERIES
Discharge: HOME | End: 2021-05-11
Payer: COMMERCIAL

## 2021-05-11 VITALS — WEIGHT: 206 LBS | BODY MASS INDEX: 35.36 KG/M2

## 2021-05-11 DIAGNOSIS — O99.810 ABNORMAL MATERNAL GLUCOSE TOLERANCE, ANTEPARTUM: Primary | ICD-10-CM

## 2021-05-11 PROCEDURE — G0108 DIAB MANAGE TRN  PER INDIV: HCPCS

## 2021-05-11 NOTE — PROGRESS NOTES
Gestational Diabetes Education Progress Note - PHONE  This visit is a TELEPHONE encounter (During ALQXO-10 public health emergency). Pursuant to the emergency declaration under the [facility], [address] [place] [address] authority and the [employer] Act, this TELEPHONE Visit was conducted with patient's (and/or legal guardian's) consent, to reduce the patient's risk of exposure to COVID-19 and provide necessary medical care. The patient (and/or legal guardian) has also been advised to contact this office for worsening conditions or problems, and seek emergency medical treatment and/or call 911 if deemed necessary. Patient identification was verified at the start of the visit: Yes    Total time spent for this encounter:  1 hour    Services were provided through a video synchronous discussion virtually to substitute for in-person clinic visit. Patient and provider were located at their individual home/office. Assessment of learning needs and self care was done - see Gestational Diabetes Screening          Teaching provided at this session included:   _X__ Definition of gestational diabetes    _X_ Risk factors   _X__ Effects on pregnancy       _ X_ Management   _X__ Self-monitoring of blood sugar (FBS and 2 hrs postprandial)   _X__ Blood sugar targets FBS 65-90 and <120 2 hrs postprandial)   - stated she was not given a lancet device with her kit and is poking finger with the lancets - will call pharmacy to ask if she can  the device -    _X__  Role of Insulins and use of injection methods  _x__ Pen   __x_ [place]    _x___Hyperglycemia  _x__ Hypoglycemia and treatment           Meal planning:   _X_  Avoid fruit juice and sugary beverages.               _X_  Aim to eat small frequent meals and snacks. (ie., 3 + 3)                _X__  Eat balanced meals according to divided dinner plate sample  bk - corn totilla and egg - sn at lunch - small meal bad indigestion - will also have mid day snack - and dinner - stated she is having sn at bedtime  - watermelon - cutting back - switching to berries and apples and pop corn   - likes cucumbers with lime and chili seasoning for snack  - does like [place] style - but avoids rice           Planned follow-up:    _x_  Another appointment /  phone call support has been scheduled for RD on              _X_ Patient is to report blood glucose test results to physician as directed by  prescribing physician. Resource materials provided via mail prior to call includes:   Gestational Diabetes Mellitus ( GDM) toolkit form [place] gestational diabetes postpartum care learning collaborative 2018. Gestational diabetes handout from [facility] jan 2016  \"Simple Guidelines for meal planning with gestational diabetes\" handout 3 meals and 3 snacks  SMBG sheets to fax back to [facility] weekly  BD  healthy injection site selection and rotation with 6 mm insulin syringe and 4 mm pen needle  Did you have gestational diabetes when you were pregnant? Handout from [facility]  April 2014      Patient has set goal for practice of diabetes self management :  SMBG- check fasting and 2 hours PP, eat 3 meals and 3 snacks/day, avoid sugary beverages. Patient stated has use of a home BG meter and has /  has not started to test BG. Self report BG are fasting are above target 100 - 110 and post meals 120 -145    Patient stated understanding of role of insulin in care of gestational diabetes and if needs support to start to use insulin follow up care will be planned.     Shoaib Arboleda RN CDE

## 2021-05-12 ENCOUNTER — ROUTINE PRENATAL (OUTPATIENT)
Dept: OBGYN CLINIC | Age: 31
End: 2021-05-12
Payer: COMMERCIAL

## 2021-05-12 VITALS
HEART RATE: 93 BPM | DIASTOLIC BLOOD PRESSURE: 84 MMHG | WEIGHT: 206 LBS | SYSTOLIC BLOOD PRESSURE: 141 MMHG | BODY MASS INDEX: 35.36 KG/M2

## 2021-05-12 DIAGNOSIS — Z98.890 HISTORY OF LEEP (LOOP ELECTROSURGICAL EXCISION PROCEDURE) OF CERVIX COMPLICATING PREGNANCY IN THIRD TRIMESTER: ICD-10-CM

## 2021-05-12 DIAGNOSIS — O26.873 SHORT CERVICAL LENGTH DURING PREGNANCY IN THIRD TRIMESTER: ICD-10-CM

## 2021-05-12 DIAGNOSIS — O34.43 HISTORY OF LEEP (LOOP ELECTROSURGICAL EXCISION PROCEDURE) OF CERVIX COMPLICATING PREGNANCY IN THIRD TRIMESTER: ICD-10-CM

## 2021-05-12 DIAGNOSIS — N88.3 INCOMPETENT CERVIX: ICD-10-CM

## 2021-05-12 DIAGNOSIS — O09.93 HRP (HIGH RISK PREGNANCY), THIRD TRIMESTER: Primary | ICD-10-CM

## 2021-05-12 DIAGNOSIS — O34.30 CERVICAL CERCLAGE SUTURE PRESENT, ANTEPARTUM: ICD-10-CM

## 2021-05-12 DIAGNOSIS — Z3A.30 30 WEEKS GESTATION OF PREGNANCY: ICD-10-CM

## 2021-05-12 DIAGNOSIS — Z87.59 HISTORY OF GESTATIONAL HYPERTENSION: ICD-10-CM

## 2021-05-12 PROBLEM — Z3A.25 25 WEEKS GESTATION OF PREGNANCY: Status: RESOLVED | Noted: 2021-04-07 | Resolved: 2021-05-12

## 2021-05-12 PROBLEM — Z3A.28 28 WEEKS GESTATION OF PREGNANCY: Status: RESOLVED | Noted: 2021-04-26 | Resolved: 2021-05-12

## 2021-05-12 PROBLEM — Z3A.24 24 WEEKS GESTATION OF PREGNANCY: Status: RESOLVED | Noted: 2021-03-31 | Resolved: 2021-05-12

## 2021-05-12 PROCEDURE — 96372 THER/PROPH/DIAG INJ SC/IM: CPT | Performed by: CLINICAL NURSE SPECIALIST

## 2021-05-12 PROCEDURE — 0502F SUBSEQUENT PRENATAL CARE: CPT | Performed by: CLINICAL NURSE SPECIALIST

## 2021-05-12 RX ORDER — HYDROXYPROGESTERONE CAPROATE 250 MG/ML
250 INJECTION INTRAMUSCULAR ONCE
Status: COMPLETED | OUTPATIENT
Start: 2021-05-12 | End: 2021-05-12

## 2021-05-12 RX ADMIN — HYDROXYPROGESTERONE CAPROATE 250 MG: 250 INJECTION INTRAMUSCULAR at 09:42

## 2021-05-12 NOTE — PROGRESS NOTES
Shahid Harris is a 32 y.o. female 27w4d    [id number]    OB History    Para Term  AB Living   4 2 1 1 1 2   SAB TAB Ectopic Molar Multiple Live Births   1       0 2      # Outcome Date GA Lbr Chema/2nd Weight Sex Delivery Anes PTL Lv   4 Current            3 Term 18 38w3d 00:48 / 00:43 7 lb 6.9 oz (3.37 kg) F Vag-Spont EPI N CASTILLO   2  10/18/11 28w0d  3 lb 9 oz (1.616 kg) F Vag-Spont EPI Y CASTILLO      Complications: Placenta previa, Placental abruption in third trimester   1 SAB  13w0d             Birth Comments: had d&c       Blood pressure (!) 141/84, pulse 93, weight 206 lb (93.4 kg), last menstrual period 10/15/2020, not currently breastfeeding. The patient was seen and evaluated. There was positive fetal movements. No contractions or leakage of fluid. Signs and symptoms of  labor as well as labor were reviewed. The S/S of Pre-Eclampsia were reviewed with the patient in detail. She is to report any of these if they occur. She currently denies any of these. The patient had her 28 week labs completed. The patient was instructed on fetal kick counts and was given a kick sheet to complete every 8 hours. She was instructed that the baby should move at a minimum of ten times within one hour after a meal. The patient was instructed to lay down on her left side twenty minutes after eating and count movements for up to one hour with a target value of ten movements. She was instructed to notify the office if she did not make that target after two attempts or if after any attempt there was less than four movements. The patient reports that the targets have been made Yes.     Patient Active Problem List    Diagnosis Date Noted    29 weeks gestation of pregnancy 2021    Need for Tdap vaccination 2021    Hypertension affecting pregnancy in second trimester 2021    Vaz Cerclage placed 3/17/21 2021     #5 Ethibond suture, knot @ 12      Incompetent cervix     Bilateral lower extremity edema 03/10/2021    History of cervical cerclage, currently pregnant, second trimester 12/30/2020    History of gestational hypertension 12/30/2020    S/P Vaz Cervical cerclage placed 6/1/18, removed 9/13 08/13/2018     Knot at 12 o'clock      Noncompliance 08/13/2018    Poor historian 08/13/2018    HRP (high risk pregnancy), third trimester 08/09/2018    HRP (high risk pregnancy), second trimester 05/31/2018    Short cervical length during pregnancy in second trimester 05/31/2018     16mm CL found 8/23/18 in office  17 mm CL found 5/31/18 in office. Patient currently taking Ozell Charan H/O PTD (G2 @ 28w) 05/15/2018        Diagnosis Orders   1. HRP (high risk pregnancy), third trimester  HYDROXYprogesterone caproate oil injection 250 mg   2. 30 weeks gestation of pregnancy     3. Vaz Cerclage placed 3/17/21     4. History of gestational hypertension     5. Incompetent cervix     6. Short cervical length during pregnancy in third trimester     7. History of LEEP (loop electrosurgical excision procedure) of cervix complicating pregnancy in third trimester     Continue prenatal vitamins, increase water intake and frequent rest periods as needed. Fetal kick counts reviewed. The patient will return to the office for her next visit in 1 weeks. See antepartum flow sheet.      Electronically signed by: Meche Fischer CNP

## 2021-05-12 NOTE — PROGRESS NOTES
Patient was given 17P in the Right Gluteus Tre. NDC# N/A  LOT# [id number]  Exp date- 06/09/2021  Patient's last injection was 05/03/2021. Patient's last annual exam was on N/A. Patient tolerated well without difficulty.

## 2021-05-17 ENCOUNTER — ROUTINE PRENATAL (OUTPATIENT)
Dept: OBGYN CLINIC | Age: 31
End: 2021-05-17
Payer: COMMERCIAL

## 2021-05-17 VITALS
SYSTOLIC BLOOD PRESSURE: 145 MMHG | HEART RATE: 88 BPM | WEIGHT: 207 LBS | BODY MASS INDEX: 35.53 KG/M2 | DIASTOLIC BLOOD PRESSURE: 85 MMHG

## 2021-05-17 DIAGNOSIS — O09.93 HRP (HIGH RISK PREGNANCY), THIRD TRIMESTER: Primary | ICD-10-CM

## 2021-05-17 DIAGNOSIS — Z3A.31 31 WEEKS GESTATION OF PREGNANCY: ICD-10-CM

## 2021-05-17 DIAGNOSIS — O26.872 SHORT CERVICAL LENGTH DURING PREGNANCY IN SECOND TRIMESTER: ICD-10-CM

## 2021-05-17 PROCEDURE — 0502F SUBSEQUENT PRENATAL CARE: CPT | Performed by: SPECIALIST

## 2021-05-17 RX ORDER — LABETALOL 100 MG/1
TABLET, FILM COATED ORAL
Qty: 30 TABLET | Refills: 1 | Status: SHIPPED | OUTPATIENT
Start: 2021-05-17 | End: 2021-06-21 | Stop reason: SDUPTHER

## 2021-05-18 RX ORDER — HYDROXYPROGESTERONE CAPROATE 250 MG/ML
1 INJECTION INTRAMUSCULAR ONCE
Status: COMPLETED | OUTPATIENT
Start: 2021-05-18 | End: 2021-05-24

## 2021-05-18 RX ORDER — LABETALOL 100 MG/1
100 TABLET, FILM COATED ORAL 2 TIMES DAILY
Qty: 30 TABLET | Refills: 1 | Status: SHIPPED | OUTPATIENT
Start: 2021-05-18 | End: 2021-06-08 | Stop reason: SDUPTHER

## 2021-05-18 NOTE — PROGRESS NOTES
Phuong Lee is a 32 y.o. female 27w3d    [id number]    OB History    Para Term  AB Living   4 2 1 1 1 2   SAB TAB Ectopic Molar Multiple Live Births   1       0 2      # Outcome Date GA Lbr Chema/2nd Weight Sex Delivery Anes PTL Lv   4 Current            3 Term 18 38w3d 00:48 / 00:43 7 lb 6.9 oz (3.37 kg) F Vag-Spont EPI N CASTILLO   2  10/18/11 28w0d  3 lb 9 oz (1.616 kg) F Vag-Spont EPI Y CASTILLO      Complications: Placenta previa, Placental abruption in third trimester   1 SAB  13w0d             Birth Comments: had d&c       Blood pressure (!) 145/85, pulse 88, weight 207 lb (93.9 kg), last menstrual period 10/15/2020, not currently breastfeeding. The patient was seen and evaluated. There was positive fetal movements. No contractions or leakage of fluid. Signs and symptoms of  labor as well as labor were reviewed. The S/S of Pre-Eclampsia were reviewed with the patient in detail. She is to report any of these if they occur. She currently denies any of these. The patient had her 28 week labs ordered. The patient was instructed on fetal kick counts and was given a kick sheet to complete every 8 hours. She was instructed that the baby should move at a minimum of ten times within one hour after a meal. The patient was instructed to lay down on her left side twenty minutes after eating and count movements for up to one hour with a target value of ten movements. She was instructed to notify the office if she did not make that target after two attempts or if after any attempt there was less than four movements. The patient reports that the targets have been made Yes.     Patient Active Problem List    Diagnosis Date Noted    29 weeks gestation of pregnancy 2021    Need for Tdap vaccination 2021    Hypertension affecting pregnancy in second trimester 2021    Vaz Cerclage placed 3/17/21 2021     #5 Ethibond suture, knot @ 12      Incompetent

## 2021-05-24 PROCEDURE — 96372 THER/PROPH/DIAG INJ SC/IM: CPT | Performed by: SPECIALIST

## 2021-05-24 RX ADMIN — HYDROXYPROGESTERONE CAPROATE 1 ML: 250 INJECTION INTRAMUSCULAR at 16:20

## 2021-05-25 ENCOUNTER — HOSPITAL ENCOUNTER (OUTPATIENT)
Dept: DIABETES SERVICES | Age: 31
Setting detail: THERAPIES SERIES
Discharge: HOME | End: 2021-05-25
Payer: COMMERCIAL

## 2021-05-25 DIAGNOSIS — O99.810 ABNORMAL MATERNAL GLUCOSE TOLERANCE, ANTEPARTUM: Primary | ICD-10-CM

## 2021-05-25 DIAGNOSIS — Z87.59 HISTORY OF GESTATIONAL HYPERTENSION: ICD-10-CM

## 2021-05-25 PROCEDURE — G0108 DIAB MANAGE TRN  PER INDIV: HCPCS

## 2021-05-25 NOTE — PROGRESS NOTES
[place] and the way her family cooks. Now, time more limited for her d/t helping dad who's ill and getting her daughter to and from school. Mailed meal plan for cho's 2,1,3-4,1,1,3-4 (limit to 3 if pp morales elevated),1  _X_  Avoid fruit juice and sugary beverages. _X_  Aim to eat small frequent meals and snacks. (ie., 3 + 3)                _X__  Eat balanced meals according to divided dinner plate sample  bk - corn totilla and egg - sn at lunch - small meal bad indigestion - will also have mid day snack - and dinner - stated she is having sn at bedtime  - watermelon - cutting back - switching to berries and apples and pop corn   - likes cucumbers with lime and chili seasoning for snack  - does like [place] style - but avoids rice           Planned follow-up:    _x_  Another appointment /  phone call support has been scheduled for RD on5/25/21 JW. Encouraged pt to call should questions arise now or after baby's born. _X_ Patient is to report blood glucose test results to physician as directed by  prescribing physician. Resource materials provided via mail prior to call includes:   Gestational Diabetes Mellitus ( GDM) toolkit form [place] gestational diabetes postpartum care learning collaborative 2018. Gestational diabetes handout from [facility] jan 2016  \"Simple Guidelines for meal planning with gestational diabetes\" handout 3 meals and 3 snacks  SMBG sheets to fax back to [facility] weekly  BD  healthy injection site selection and rotation with 6 mm insulin syringe and 4 mm pen needle  Did you have gestational diabetes when you were pregnant? Handout from [facility]  April 2014      Patient has set goal for practice of diabetes self management :  SMBG- check fasting and 2 hours PP, eat 3 meals and 3 snacks/day, avoid sugary beverages. Patient stated has use of a home BG meter and has /  has not started to test BG.   Self report BG are fasting are above target 100 - 110 and post meals 120 -145    Patient stated understanding of role of insulin in care of gestational diabetes and if needs support to start to use insulin follow up care will be planned.     Trena Anders RD, LD CDE

## 2021-05-26 ENCOUNTER — ROUTINE PRENATAL (OUTPATIENT)
Dept: OBGYN CLINIC | Age: 31
End: 2021-05-26
Payer: COMMERCIAL

## 2021-05-26 VITALS
SYSTOLIC BLOOD PRESSURE: 121 MMHG | HEART RATE: 100 BPM | DIASTOLIC BLOOD PRESSURE: 73 MMHG | WEIGHT: 211 LBS | TEMPERATURE: 97.8 F | BODY MASS INDEX: 36.22 KG/M2

## 2021-05-26 DIAGNOSIS — N88.3 INCOMPETENT CERVIX: ICD-10-CM

## 2021-05-26 DIAGNOSIS — O16.3 HYPERTENSION AFFECTING PREGNANCY IN THIRD TRIMESTER: ICD-10-CM

## 2021-05-26 DIAGNOSIS — O26.873 SHORT CERVICAL LENGTH DURING PREGNANCY IN THIRD TRIMESTER: ICD-10-CM

## 2021-05-26 DIAGNOSIS — O34.30 CERVICAL CERCLAGE SUTURE PRESENT, ANTEPARTUM: ICD-10-CM

## 2021-05-26 DIAGNOSIS — O09.93 ENCOUNTER FOR SUPERVISION OF HIGH RISK PREGNANCY IN THIRD TRIMESTER, ANTEPARTUM: Primary | ICD-10-CM

## 2021-05-26 DIAGNOSIS — O09.212 PREVIOUS PRETERM DELIVERY IN SECOND TRIMESTER, ANTEPARTUM: ICD-10-CM

## 2021-05-26 DIAGNOSIS — O24.410 DIET CONTROLLED GESTATIONAL DIABETES MELLITUS (GDM) IN THIRD TRIMESTER: ICD-10-CM

## 2021-05-26 LAB
ACCELERATIONS > 10BPM: NORMAL
ACCELERATIONS > 15 BPM: 2
ACOUSTIC STIMULATION: NO
DECELERATIONS: NORMAL
FHR VARIABILITIES: NORMAL
NST ASSESSMENT: REACTIVE
NST BASELINE: 150
NST DURATION: 20 MINUTES
NST INDICATIONS: NORMAL
NST LOCATIONS: NORMAL
NST READ BY: NORMAL
NST RETURN: NORMAL
UTERINE ACTIVITY: NO

## 2021-05-26 PROCEDURE — 59025 FETAL NON-STRESS TEST: CPT | Performed by: CLINICAL NURSE SPECIALIST

## 2021-05-26 PROCEDURE — 0502F SUBSEQUENT PRENATAL CARE: CPT | Performed by: CLINICAL NURSE SPECIALIST

## 2021-05-26 PROCEDURE — 96372 THER/PROPH/DIAG INJ SC/IM: CPT | Performed by: CLINICAL NURSE SPECIALIST

## 2021-05-26 RX ORDER — HYDROXYPROGESTERONE CAPROATE 250 MG/ML
250 INJECTION INTRAMUSCULAR ONCE
Status: COMPLETED | OUTPATIENT
Start: 2021-05-26 | End: 2021-05-26

## 2021-05-26 RX ADMIN — HYDROXYPROGESTERONE CAPROATE 250 MG: 250 INJECTION INTRAMUSCULAR at 11:50

## 2021-05-26 NOTE — PROGRESS NOTES
Kathya Sheikh is a 32 y.o. female 32w2d    [id number]    OB History    Para Term  AB Living   4 2 1 1 1 2   SAB TAB Ectopic Molar Multiple Live Births   1       0 2      # Outcome Date GA Lbr Chema/2nd Weight Sex Delivery Anes PTL Lv   4 Current            3 Term 18 38w3d 00:48 / 00:43 7 lb 6.9 oz (3.37 kg) F Vag-Spont EPI N CASTILLO   2  10/18/11 28w0d  3 lb 9 oz (1.616 kg) F Vag-Spont EPI Y CASTILLO      Complications: Placenta previa, Placental abruption in third trimester   1 SAB  13w0d             Birth Comments: had d&c       Blood pressure 121/73, pulse 100, temperature 97.8 °F (36.6 °C), temperature source Temporal, weight 211 lb (95.7 kg), last menstrual period 10/15/2020, not currently breastfeeding. The patient was seen and evaluated. There was positive fetal movements. No contractions or leakage of fluid. Signs and symptoms of  labor as well as labor were reviewed. The S/S of Pre-Eclampsia were reviewed with the patient in detail. She is to report any of these if they occur. She currently denies any of these. The patient had her 28 week labs completed. The patient was instructed on fetal kick counts and was given a kick sheet to complete every 8 hours. She was instructed that the baby should move at a minimum of ten times within one hour after a meal. The patient was instructed to lay down on her left side twenty minutes after eating and count movements for up to one hour with a target value of ten movements. She was instructed to notify the office if she did not make that target after two attempts or if after any attempt there was less than four movements. The patient reports that the targets have been made Yes.     Patient Active Problem List    Diagnosis Date Noted    Diet controlled gestational diabetes mellitus (GDM) in third trimester 2021    29 weeks gestation of pregnancy 2021    Need for Tdap vaccination 2021    Hypertension affecting pregnancy in second trimester 04/12/2021    Vaz Cerclage placed 3/17/21 03/17/2021     #5 Ethibond suture, knot @ 12      Incompetent cervix     Bilateral lower extremity edema 03/10/2021    History of cervical cerclage, currently pregnant, second trimester 12/30/2020    History of gestational hypertension 12/30/2020    S/P Vaz Cervical cerclage placed 6/1/18, removed 9/13 08/13/2018     Knot at 12 o'clock      Noncompliance 08/13/2018    Poor historian 08/13/2018    Encounter for supervision of high risk pregnancy in third trimester, antepartum 08/09/2018    HRP (high risk pregnancy), second trimester 05/31/2018    Short cervical length during pregnancy in second trimester 05/31/2018     16mm CL found 8/23/18 in office  17 mm CL found 5/31/18 in office. Patient currently taking Parley Sp H/O PTD (G2 @ 28w) 05/15/2018        Diagnosis Orders   1. Encounter for supervision of high risk pregnancy in third trimester, antepartum     2. Diet controlled gestational diabetes mellitus (GDM) in third trimester     3. Vaz Cerclage placed 3/17/21     4. Hypertension affecting pregnancy in third trimester     5. Incompetent cervix     6. Short cervical length during pregnancy in third trimester  HYDROXYprogesterone caproate oil injection 250 mg   7. H/O PTD (G2 @ 28w)     Continue prenatal vitamins, increase water intake and frequent rest periods as needed. Fetal kick counts reviewed. The patient will return to the office for her next visit in 1 weeks. See antepartum flow sheet.      Electronically signed by: Gareth Sanchez CNP

## 2021-05-27 ENCOUNTER — HOSPITAL ENCOUNTER (OUTPATIENT)
Age: 31
Discharge: HOME | End: 2021-05-27
Attending: SPECIALIST | Admitting: SPECIALIST
Payer: COMMERCIAL

## 2021-05-27 VITALS
HEART RATE: 96 BPM | RESPIRATION RATE: 16 BRPM | TEMPERATURE: 98.1 F | SYSTOLIC BLOOD PRESSURE: 136 MMHG | DIASTOLIC BLOOD PRESSURE: 72 MMHG

## 2021-05-27 LAB
BILIRUBIN URINE: NEGATIVE
COLOR: YELLOW
COMMENT UA: NORMAL
GLUCOSE BLD-MCNC: 134 MG/DL (ref 65–105)
GLUCOSE URINE: NEGATIVE
KETONES, URINE: NEGATIVE
LEUKOCYTE ESTERASE, URINE: NEGATIVE
NITRITE, URINE: NEGATIVE
PH UA: 6 (ref 5–8)
PROTEIN UA: NEGATIVE
SPECIFIC GRAVITY UA: 1.01 (ref 1–1.03)
TURBIDITY: CLEAR
URINE HGB: NEGATIVE
UROBILINOGEN, URINE: NORMAL

## 2021-05-27 PROCEDURE — 82947 ASSAY GLUCOSE BLOOD QUANT: CPT

## 2021-05-27 PROCEDURE — 99213 OFFICE O/P EST LOW 20 MIN: CPT

## 2021-05-27 PROCEDURE — 81003 URINALYSIS AUTO W/O SCOPE: CPT

## 2021-05-27 RX ORDER — PROMETHAZINE HYDROCHLORIDE 12.5 MG/1
12.5 TABLET ORAL EVERY 6 HOURS PRN
Status: DISCONTINUED | OUTPATIENT
Start: 2021-05-27 | End: 2021-05-27 | Stop reason: HOSPADM

## 2021-05-27 RX ORDER — ACETAMINOPHEN 325 MG/1
650 TABLET ORAL EVERY 4 HOURS PRN
Status: DISCONTINUED | OUTPATIENT
Start: 2021-05-27 | End: 2021-05-27 | Stop reason: HOSPADM

## 2021-05-27 RX ORDER — ONDANSETRON 2 MG/ML
4 INJECTION INTRAMUSCULAR; INTRAVENOUS EVERY 6 HOURS PRN
Status: DISCONTINUED | OUTPATIENT
Start: 2021-05-27 | End: 2021-05-27 | Stop reason: HOSPADM

## 2021-05-27 NOTE — FLOWSHEET NOTE
HISTORY OF PRESENT ILLNESS  Janeth Gibson is a 15 y.o. female. HPI Comments: Wilfrido Lutz reports episodic central lumbar pain with prolonged sitting and occasional left knee pain. She was evaluated by a chiropractor (family friend) who advised her and her father that he was concerned about possible abnormal curvature of the spine and recommended x-rays of the cervical, thoracic and lumbar spine. He suggested she have the studies done here which would be covered by her health insurance. Back Pain   The history is provided by the patient, parent and medical records. Associated symptoms include headaches (taking Advil every other night for headache). 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 Max Daily Amount: 30 mg, Disp: 30 Cap, Rfl: 0      Review of Systems   Constitutional: Negative for fever and weight loss. Musculoskeletal: Positive for back pain (central lumbar pain after prolonged standing - off and on since last year-feels better with Icey Hot pack ) and joint pain (left knee hurts occcasionally, not as much as least year). Neurological: Positive for headaches (taking Advil every other night for headache). Negative for sensory change and focal weakness. Visit Vitals    /62 (BP 1 Location: Left arm, BP Patient Position: Sitting)    Pulse 75    Temp 98.4 °F (36.9 °C) (Oral)    Resp 18    Ht 5' 4.5\" (1.638 m)    Wt 129 lb 6.4 oz (58.7 kg)    SpO2 99%    BMI 21.87 kg/m2       Physical Exam   Constitutional: She is oriented to person, place, and time. She appears well-developed and well-nourished. HENT:   Head: Normocephalic.    Eyes: Conjunctivae and EOM are Patient wheeled to lobby for discharge with mother. normal.   Neck: Neck supple. Cardiovascular: Normal rate, regular rhythm and normal heart sounds. Pulmonary/Chest: Effort normal and breath sounds normal.   Musculoskeletal: She exhibits no edema. Back exam reveals no tenderness to palpation, negative straight leg raise and JAZMIN bilaterally, LE muscle strength grossly intact and symmetrical    No gross evidence of scoliosis on exam    Left knee with no effusion or joint line tenderness, negative anterior drawer, no varus or valgus instability, negative patellar grind     Neurological: She is alert and oriented to person, place, and time. Skin: Skin is warm and dry. Psychiatric: She has a normal mood and affect. Her behavior is normal.   Nursing note and vitals reviewed. ASSESSMENT and PLAN    ICD-10-CM ICD-9-CM    1. Musculoskeletal back pain M54.9 724.5    Patient and father advised that I would not recommend these studies in view of the significant radiation exposure and low likelihood of obtaining useful information. Apply warm wet compresses frequently, avoid painful activity, take OTC analgesics such as ibuprofen or acetaminophen as needed. Follow exercise instructions  Follow up for new symptoms, worsening symptoms or failure to improve.

## 2021-05-27 NOTE — FLOWSHEET NOTE
Discharge instructions given to patient per J. Avelino Oppenheim, RN. Patient signs and states understanding. Copies given. EFM removed.

## 2021-05-27 NOTE — FLOWSHEET NOTE
Dr. Stefani Tomlin notified of patients arrival from the ED with complaints of abd. Cramping. Patient has a cerclage in placed.

## 2021-05-31 NOTE — DISCHARGE SUMMARY
32 years female [id number] send from the ER where she came with the complain of abdominal pain and decreased fetal movement. Pt is 32 weeks and 3 days. Obstetric Discharge Summary  [facility]    Patient Name: Shahid Harris  Patient : 1990  Primary Care Physician: No primary care provider on file. Admit Date: 2021    Principal Diagnosis: IUP at 32w3d, admitted for Decreased Fetal Activity  And abdominal pain     Her pregnancy has been complicated by:   Patient Active Problem List   Diagnosis    H/O PTD (G2 @ 28w)    HRP (high risk pregnancy), second trimester    Short cervical length during pregnancy in second trimester    Encounter for supervision of high risk pregnancy in third trimester, antepartum    S/P Vaz Cervical cerclage placed 18, removed     Noncompliance    Poor historian    History of cervical cerclage, currently pregnant, second trimester    History of gestational hypertension    Bilateral lower extremity edema    Incompetent cervix    Vaz Cerclage placed 3/17/21    Hypertension affecting pregnancy in second trimester    Need for Tdap vaccination    29 weeks gestation of pregnancy    Diet controlled gestational diabetes mellitus (GDM) in third trimester       Infection Present?: No  Hospital Acquired: No    Surgical Operations & Procedures:  [] Pitocin Induction of Labor  [] Pitocin Augmentation of Labor  [] Prostaglandin Induction of Labor  [] Mechanical Induction of Labor  [] Artificial Rupture of Membranes  [] Intrauterine Pressure Catheter  [] Fetal Scalp Electrode  [] Amnioinfusion  Analgesia: N/A  Delivery Type: not delivered      Consultations: none    Pertinent Findings & Procedures:   Shahid Harris is a 32 y.o. female [id number] at [address] [facility] admitted for pain and contractions;  Urine was done which was negative. This patient has no babies on file.       Course of patient: uncomplicated    Discharge to:

## 2021-06-02 ENCOUNTER — ROUTINE PRENATAL (OUTPATIENT)
Dept: OBGYN CLINIC | Age: 31
End: 2021-06-02
Payer: COMMERCIAL

## 2021-06-02 VITALS
BODY MASS INDEX: 36.05 KG/M2 | HEART RATE: 94 BPM | WEIGHT: 210 LBS | DIASTOLIC BLOOD PRESSURE: 84 MMHG | SYSTOLIC BLOOD PRESSURE: 130 MMHG

## 2021-06-02 DIAGNOSIS — O26.872 SHORT CERVICAL LENGTH DURING PREGNANCY IN SECOND TRIMESTER: ICD-10-CM

## 2021-06-02 DIAGNOSIS — O09.93 HRP (HIGH RISK PREGNANCY), THIRD TRIMESTER: Primary | ICD-10-CM

## 2021-06-02 DIAGNOSIS — Z3A.32 32 WEEKS GESTATION OF PREGNANCY: ICD-10-CM

## 2021-06-02 PROCEDURE — 0502F SUBSEQUENT PRENATAL CARE: CPT | Performed by: SPECIALIST

## 2021-06-02 PROCEDURE — 96372 THER/PROPH/DIAG INJ SC/IM: CPT | Performed by: SPECIALIST

## 2021-06-02 RX ORDER — HYDROXYPROGESTERONE CAPROATE 250 MG/ML
1 INJECTION INTRAMUSCULAR ONCE
Status: COMPLETED | OUTPATIENT
Start: 2021-06-02 | End: 2021-06-02

## 2021-06-02 RX ADMIN — HYDROXYPROGESTERONE CAPROATE 1 ML: 250 INJECTION INTRAMUSCULAR at 16:44

## 2021-06-03 ENCOUNTER — ROUTINE PRENATAL (OUTPATIENT)
Dept: OBGYN CLINIC | Age: 31
End: 2021-06-03

## 2021-06-03 DIAGNOSIS — O09.93 ENCOUNTER FOR SUPERVISION OF HIGH RISK PREGNANCY IN THIRD TRIMESTER, ANTEPARTUM: ICD-10-CM

## 2021-06-03 DIAGNOSIS — O16.3 HYPERTENSION AFFECTING PREGNANCY IN THIRD TRIMESTER: ICD-10-CM

## 2021-06-03 LAB
ACCELERATIONS > 10BPM: NORMAL
ACCELERATIONS > 15 BPM: 2
ACOUSTIC STIMULATION: NO
DECELERATIONS: NORMAL
FHR VARIABILITIES: NORMAL
NST ASSESSMENT: REACTIVE
NST BASELINE: 145
NST DURATION: 20 MINUTES
NST INDICATIONS: NORMAL
NST LOCATIONS: NORMAL
NST READ BY: NORMAL
NST RETURN: NORMAL
UTERINE ACTIVITY: YES

## 2021-06-03 PROCEDURE — 0502F SUBSEQUENT PRENATAL CARE: CPT | Performed by: SPECIALIST

## 2021-06-03 PROCEDURE — 59025 FETAL NON-STRESS TEST: CPT | Performed by: SPECIALIST

## 2021-06-03 NOTE — PROGRESS NOTES
Mariely Russ is a 32 y.o. female 33w3d    [id number]    OB History    Para Term  AB Living   4 2 1 1 1 2   SAB TAB Ectopic Molar Multiple Live Births   1       0 2      # Outcome Date GA Lbr Chema/2nd Weight Sex Delivery Anes PTL Lv   4 Current            3 Term 18 38w3d 00:48 / 00:43 7 lb 6.9 oz (3.37 kg) F Vag-Spont EPI N CASTILLO   2  10/18/11 28w0d  3 lb 9 oz (1.616 kg) F Vag-Spont EPI Y CASTILLO      Complications: Placenta previa, Placental abruption in third trimester   1 SAB  13w0d             Birth Comments: had d&c       Blood pressure 130/84, pulse 94, weight 210 lb (95.3 kg), last menstrual period 10/15/2020, not currently breastfeeding. The patient was seen and evaluated. There was positive fetal movements. No contractions or leakage of fluid. Signs and symptoms of  labor as well as labor were reviewed. The S/S of Pre-Eclampsia were reviewed with the patient in detail. She is to report any of these if they occur. She currently denies any of these. The patient had her 28 week labs completed. The patient was instructed on fetal kick counts and was given a kick sheet to complete every 8 hours. She was instructed that the baby should move at a minimum of ten times within one hour after a meal. The patient was instructed to lay down on her left side twenty minutes after eating and count movements for up to one hour with a target value of ten movements. She was instructed to notify the office if she did not make that target after two attempts or if after any attempt there was less than four movements. The patient reports that the targets have been made Yes.     Patient Active Problem List    Diagnosis Date Noted    Diet controlled gestational diabetes mellitus (GDM) in third trimester 2021    29 weeks gestation of pregnancy 2021    Need for Tdap vaccination 2021    Hypertension affecting pregnancy in second trimester 2021   Angelica Salinas Cerclage placed 3/17/21 03/17/2021     #5 Ethibond suture, knot @ 12      Incompetent cervix     Bilateral lower extremity edema 03/10/2021    History of cervical cerclage, currently pregnant, second trimester 12/30/2020    History of gestational hypertension 12/30/2020    32 weeks gestation of pregnancy 08/13/2018    S/P Vaz Cervical cerclage placed 6/1/18, removed 9/13 08/13/2018     Knot at 12 o'clock      Noncompliance 08/13/2018    Poor historian 08/13/2018    HRP (high risk pregnancy), third trimester 08/09/2018    HRP (high risk pregnancy), second trimester 05/31/2018    Short cervical length during pregnancy in second trimester 05/31/2018     16mm CL found 8/23/18 in office  17 mm CL found 5/31/18 in office. Patient currently taking Gustavo Haw H/O PTD (G2 @ 28w) 05/15/2018        Diagnosis Orders   1. HRP (high risk pregnancy), third trimester     2. 32 weeks gestation of pregnancy     3. Short cervical length during pregnancy in second trimester         The patient will return to the office for her next visit in 1 weeks. See antepartum flow sheet.

## 2021-06-07 ENCOUNTER — NURSE ONLY (OUTPATIENT)
Dept: OBGYN CLINIC | Age: 31
End: 2021-06-07

## 2021-06-07 DIAGNOSIS — O09.93 ENCOUNTER FOR SUPERVISION OF HIGH RISK PREGNANCY IN THIRD TRIMESTER, ANTEPARTUM: ICD-10-CM

## 2021-06-07 DIAGNOSIS — O16.3 HYPERTENSION AFFECTING PREGNANCY IN THIRD TRIMESTER: ICD-10-CM

## 2021-06-07 LAB
ACCELERATIONS > 10BPM: NORMAL
ACCELERATIONS > 15 BPM: 2
ACOUSTIC STIMULATION: NO
DECELERATIONS: NORMAL
FHR VARIABILITIES: NORMAL
NST ASSESSMENT: REACTIVE
NST BASELINE: 140
NST DURATION: 20 MINUTES
NST INDICATIONS: NORMAL
NST LOCATIONS: NORMAL
NST READ BY: NORMAL
NST RETURN: NORMAL
UTERINE ACTIVITY: NO

## 2021-06-07 PROCEDURE — 59025 FETAL NON-STRESS TEST: CPT | Performed by: SPECIALIST

## 2021-06-08 ENCOUNTER — ROUTINE PRENATAL (OUTPATIENT)
Dept: PERINATAL CARE | Age: 31
End: 2021-06-08
Payer: COMMERCIAL

## 2021-06-08 ENCOUNTER — TELEPHONE (OUTPATIENT)
Dept: PERINATAL CARE | Age: 31
End: 2021-06-08

## 2021-06-08 VITALS
HEART RATE: 96 BPM | SYSTOLIC BLOOD PRESSURE: 114 MMHG | TEMPERATURE: 97.1 F | WEIGHT: 212 LBS | BODY MASS INDEX: 36.39 KG/M2 | RESPIRATION RATE: 16 BRPM | DIASTOLIC BLOOD PRESSURE: 60 MMHG

## 2021-06-08 DIAGNOSIS — O13.3 GESTATIONAL HYPERTENSION, THIRD TRIMESTER: Primary | ICD-10-CM

## 2021-06-08 DIAGNOSIS — Z13.89 ENCOUNTER FOR ROUTINE SCREENING FOR MALFORMATION USING ULTRASONICS: ICD-10-CM

## 2021-06-08 DIAGNOSIS — Z3A.34 34 WEEKS GESTATION OF PREGNANCY: ICD-10-CM

## 2021-06-08 DIAGNOSIS — O09.299 CURRENT SINGLETON PREGNANCY WITH HISTORY OF CONGENITAL ANOMALY IN PRIOR CHILD, ANTEPARTUM: ICD-10-CM

## 2021-06-08 DIAGNOSIS — O99.213 OBESITY AFFECTING PREGNANCY IN THIRD TRIMESTER: ICD-10-CM

## 2021-06-08 DIAGNOSIS — O24.414 INSULIN CONTROLLED GESTATIONAL DIABETES MELLITUS (GDM) DURING PREGNANCY, ANTEPARTUM: ICD-10-CM

## 2021-06-08 DIAGNOSIS — Z36.4 ANTENATAL SCREENING FOR FETAL GROWTH RETARDATION USING ULTRASONICS: ICD-10-CM

## 2021-06-08 PROCEDURE — 76820 UMBILICAL ARTERY ECHO: CPT | Performed by: OBSTETRICS & GYNECOLOGY

## 2021-06-08 PROCEDURE — 76816 OB US FOLLOW-UP PER FETUS: CPT | Performed by: OBSTETRICS & GYNECOLOGY

## 2021-06-08 PROCEDURE — 76819 FETAL BIOPHYS PROFIL W/O NST: CPT | Performed by: OBSTETRICS & GYNECOLOGY

## 2021-06-08 RX ORDER — HYDROXYPROGESTERONE CAPROATE 250 MG/ML
250 INJECTION INTRAMUSCULAR
COMMUNITY
End: 2021-06-25 | Stop reason: ALTCHOICE

## 2021-06-08 NOTE — TELEPHONE ENCOUNTER
Rx called into the pharmacy [facility]  for NPH insulin (needles/syringes/vial/pen) 15 units at bedtime a one month supply with no refills, Novolog (needles/syringes/vial/pen) insulin 6 units before dinner a one month supply with no refills. Rx called in by LOLA JOLLEY per Dr. Elvi Bernal

## 2021-06-08 NOTE — PROGRESS NOTES
Blood sugars reviewed (insulin regimen initiated, as below). Insulin regimen started: NPH Insulin subcutaneously 15 units before bedtime  (consistently elevated fasting blood sugars above 90's). Insulin regimen started: 6 units subcutaneous (SQ) Novolog before dinner (consistently elevated postprandial dinner values). Please continue to send blood glucose monitoring information to [facility] office so that insulin and/or dietary changes can be made if necessary weekly. Diabetic education/nutrition counseling completed. Continue recommended ADA diet therapy for diabetes. Compliance with regular prenatal care and blood sugar monitoring strongly encouraged. Strongly advised patient to be compliant with blood sugar monitoring as previously advised to minimize the potential of adverse  outcomes (e.g. fetal demise/stillbirth, polyhydramnios/oligohydramnios, fetal growth abnormalities, pregnancy loss, hypertensive disorders of pregnancy, indicated  delivery, etc.), potential maternal morbidities, etc.     I would advise continuation of daily oral baby aspirin 81 mg based on guidelines by the USPSTF/ACOG (for preeclampsia prevention for pregnant women at \"high-risk\"  for preeclampsia).         I would advise delivery at 40 0/7 for gestational hypertension (without severe range blood pressure) or at diagnosis if diagnosed later in gestational age (per [employer] of [employer]stetricians and Gynecologists Task Force on Hypertension in Pregnancy, 2013 in Obstetrics & Gynecology in Volume 122, #5 and as per [employer] of [employer]stetricians and [employer]necologists and the Society for Maternal-Fetal Medicine guidelines in the ACOG Committee Opinion Number 860 (\"Medically Indicated Late- and Early-Term Deliveries\", Obstetrics & Gynecology, Volume 137, NO.2, 2021), unless maternal and/or fetal factors dictate delivery prior to this point), such as abnormalities in doppler waveforms, non-reassuring fetal heart tones/status, deterioration in biophysical profile testing, oligohydramnios with an SATNAM < 5.0 cm, development of other further maternal/fetal medical/obstetric complications of pregnancy, etc.

## 2021-06-10 ENCOUNTER — ROUTINE PRENATAL (OUTPATIENT)
Dept: OBGYN CLINIC | Age: 31
End: 2021-06-10
Payer: COMMERCIAL

## 2021-06-10 VITALS
DIASTOLIC BLOOD PRESSURE: 84 MMHG | SYSTOLIC BLOOD PRESSURE: 132 MMHG | WEIGHT: 216 LBS | HEART RATE: 97 BPM | BODY MASS INDEX: 37.08 KG/M2

## 2021-06-10 DIAGNOSIS — O09.93 ENCOUNTER FOR SUPERVISION OF HIGH RISK PREGNANCY IN THIRD TRIMESTER, ANTEPARTUM: ICD-10-CM

## 2021-06-10 DIAGNOSIS — O16.3 HYPERTENSION AFFECTING PREGNANCY IN THIRD TRIMESTER: ICD-10-CM

## 2021-06-10 DIAGNOSIS — Z3A.34 34 WEEKS GESTATION OF PREGNANCY: ICD-10-CM

## 2021-06-10 DIAGNOSIS — O26.872 SHORT CERVICAL LENGTH DURING PREGNANCY IN SECOND TRIMESTER: Primary | ICD-10-CM

## 2021-06-10 LAB
ACCELERATIONS > 10BPM: NORMAL
ACCELERATIONS > 15 BPM: 2
ACOUSTIC STIMULATION: NO
DECELERATIONS: NORMAL
FHR VARIABILITIES: NORMAL
NST ASSESSMENT: REACTIVE
NST BASELINE: 135
NST DURATION: 20 MINUTES
NST INDICATIONS: NORMAL
NST LOCATIONS: NORMAL
NST READ BY: NORMAL
NST RETURN: NORMAL
UTERINE ACTIVITY: NO

## 2021-06-10 PROCEDURE — 59025 FETAL NON-STRESS TEST: CPT | Performed by: SPECIALIST

## 2021-06-10 PROCEDURE — 0502F SUBSEQUENT PRENATAL CARE: CPT | Performed by: SPECIALIST

## 2021-06-10 PROCEDURE — 96372 THER/PROPH/DIAG INJ SC/IM: CPT | Performed by: SPECIALIST

## 2021-06-10 RX ORDER — INSULIN HUMAN 100 [IU]/ML
15 INJECTION, SUSPENSION SUBCUTANEOUS NIGHTLY
COMMUNITY
Start: 2021-06-09 | End: 2021-08-25

## 2021-06-10 RX ORDER — HYDROXYPROGESTERONE CAPROATE 250 MG/ML
250 INJECTION INTRAMUSCULAR ONCE
Status: COMPLETED | OUTPATIENT
Start: 2021-06-10 | End: 2021-06-10

## 2021-06-10 RX ORDER — INSULIN LISPRO 100 [IU]/ML
6 INJECTION, SOLUTION INTRAVENOUS; SUBCUTANEOUS
COMMUNITY
Start: 2021-06-08 | End: 2021-08-25

## 2021-06-10 RX ADMIN — HYDROXYPROGESTERONE CAPROATE 250 MG: 250 INJECTION INTRAMUSCULAR at 14:51

## 2021-06-10 NOTE — PROGRESS NOTES
of pregnancy 05/03/2021    Need for Tdap vaccination 04/26/2021    Hypertension affecting pregnancy in third trimester 04/12/2021    Vaz Cerclage placed 3/17/21 03/17/2021     #5 Ethibond suture, knot @ 12      Incompetent cervix     Bilateral lower extremity edema 03/10/2021    History of cervical cerclage, currently pregnant, second trimester 12/30/2020    History of gestational hypertension 12/30/2020    32 weeks gestation of pregnancy 08/13/2018    S/P Vaz Cervical cerclage placed 6/1/18, removed 9/13 08/13/2018     Knot at 12 o'clock      Noncompliance 08/13/2018    Poor historian 08/13/2018    Encounter for supervision of high risk pregnancy in third trimester, antepartum 08/09/2018    HRP (high risk pregnancy), second trimester 05/31/2018    Short cervical length during pregnancy in second trimester 05/31/2018     16mm CL found 8/23/18 in office  17 mm CL found 5/31/18 in office. Patient currently taking Irma Arriaga H/O PTD (G2 @ 28w) 05/15/2018        Diagnosis Orders   1. Short cervical length during pregnancy in second trimester  HYDROXYprogesterone caproate oil injection 250 mg       The patient will return to the office for her next visit in 3 days . See antepartum flow sheet.

## 2021-06-14 ENCOUNTER — NURSE ONLY (OUTPATIENT)
Dept: OBGYN CLINIC | Age: 31
End: 2021-06-14
Payer: COMMERCIAL

## 2021-06-14 DIAGNOSIS — O09.93 ENCOUNTER FOR SUPERVISION OF HIGH RISK PREGNANCY IN THIRD TRIMESTER, ANTEPARTUM: ICD-10-CM

## 2021-06-14 DIAGNOSIS — O16.3 HYPERTENSION AFFECTING PREGNANCY IN THIRD TRIMESTER: ICD-10-CM

## 2021-06-14 PROCEDURE — 59025 FETAL NON-STRESS TEST: CPT | Performed by: CLINICAL NURSE SPECIALIST

## 2021-06-15 ENCOUNTER — TELEPHONE (OUTPATIENT)
Dept: OBGYN CLINIC | Age: 31
End: 2021-06-15

## 2021-06-15 RX ORDER — PROMETHAZINE HYDROCHLORIDE 25 MG/1
25 TABLET ORAL EVERY 6 HOURS PRN
Qty: 30 TABLET | Refills: 0 | Status: SHIPPED | OUTPATIENT
Start: 2021-06-15 | End: 2021-08-25

## 2021-06-16 PROBLEM — Z3A.29 29 WEEKS GESTATION OF PREGNANCY: Status: RESOLVED | Noted: 2021-05-03 | Resolved: 2021-06-16

## 2021-06-16 PROBLEM — O24.414 INSULIN CONTROLLED GESTATIONAL DIABETES MELLITUS (GDM) IN THIRD TRIMESTER: Status: ACTIVE | Noted: 2021-05-26

## 2021-06-16 PROBLEM — O09.92 HRP (HIGH RISK PREGNANCY), SECOND TRIMESTER: Status: RESOLVED | Noted: 2018-05-31 | Resolved: 2021-06-16

## 2021-06-16 PROBLEM — Z3A.32 32 WEEKS GESTATION OF PREGNANCY: Status: RESOLVED | Noted: 2018-08-13 | Resolved: 2021-06-16

## 2021-06-16 PROBLEM — Z3A.34 34 WEEKS GESTATION OF PREGNANCY: Status: RESOLVED | Noted: 2021-06-10 | Resolved: 2021-06-16

## 2021-06-17 ENCOUNTER — ROUTINE PRENATAL (OUTPATIENT)
Dept: OBGYN CLINIC | Age: 31
End: 2021-06-17

## 2021-06-17 ENCOUNTER — HOSPITAL ENCOUNTER (OUTPATIENT)
Age: 31
Setting detail: SPECIMEN
Discharge: HOME | End: 2021-06-17
Payer: COMMERCIAL

## 2021-06-17 VITALS
BODY MASS INDEX: 37.08 KG/M2 | HEART RATE: 88 BPM | DIASTOLIC BLOOD PRESSURE: 75 MMHG | WEIGHT: 216 LBS | SYSTOLIC BLOOD PRESSURE: 121 MMHG

## 2021-06-17 DIAGNOSIS — O34.30 CERVICAL CERCLAGE SUTURE PRESENT, ANTEPARTUM: ICD-10-CM

## 2021-06-17 DIAGNOSIS — Z3A.35 35 WEEKS GESTATION OF PREGNANCY: ICD-10-CM

## 2021-06-17 DIAGNOSIS — O09.93 HIGH-RISK PREGNANCY IN THIRD TRIMESTER: ICD-10-CM

## 2021-06-17 DIAGNOSIS — O09.93 HIGH-RISK PREGNANCY IN THIRD TRIMESTER: Primary | ICD-10-CM

## 2021-06-17 DIAGNOSIS — O24.414 INSULIN CONTROLLED GESTATIONAL DIABETES MELLITUS (GDM) IN THIRD TRIMESTER: ICD-10-CM

## 2021-06-17 DIAGNOSIS — O16.3 HYPERTENSION AFFECTING PREGNANCY IN THIRD TRIMESTER: ICD-10-CM

## 2021-06-17 DIAGNOSIS — N88.3 INCOMPETENT CERVIX: ICD-10-CM

## 2021-06-17 PROCEDURE — 0502F SUBSEQUENT PRENATAL CARE: CPT | Performed by: OBSTETRICS & GYNECOLOGY

## 2021-06-17 PROCEDURE — 87653 STREP B DNA AMP PROBE: CPT

## 2021-06-17 NOTE — PROGRESS NOTES
Chavo Osorio is a 32 y.o. female 35w3d    [id number]    OB History    Para Term  AB Living   4 2 1 1 1 2   SAB TAB Ectopic Molar Multiple Live Births   1       0 2      # Outcome Date GA Lbr Chema/2nd Weight Sex Delivery Anes PTL Lv   4 Current            3 Term 18 38w3d 00:48 / 00:43 7 lb 6.9 oz (3.37 kg) F Vag-Spont EPI N CASTILLO   2  10/18/11 28w0d  3 lb 9 oz (1.616 kg) F Vag-Spont EPI Y CASTILLO      Complications: Placenta previa, Placental abruption in third trimester   1 SAB  13w0d             Birth Comments: had d&c       Vitals  BP: 121/75  Weight: 216 lb (98 kg)  Pulse: 88  Patient Position: Sitting  Albumin: Negative  Glucose: Negative      The patient was seen and evaluated. There was positive fetal movements. No contractions or leakage of fluid. Signs and symptoms of  labor as well as labor were reviewed. The S/S of Pre-Eclampsia were reviewed with the patient in detail. She is to report any of these if they occur. She currently denies any of these. The patient had her 28 week labs completed.   Nurse Only on 2021   Component Date Value Ref Range Status    NST Locations 2021 [facility] Prenatal Clinic   In process    NST Indications 2021 Gestational diabetes - 648.83   In process    NST Duration 2021 20  minutes In process    NST Baseline 2021 135   In process    FHR Variabilities 2021 Moderate (6-25bpm)  Minimal (<5bpm), Moderate (6-25bpm), Marked (>25bpm) In process    Accelerations > 15 BPM 2021 2   In process    Decelerations 2021 None  None, Variable, Early, Variable/Early, Other (comment) In process    Uterine Activity 2021 No   In process    Acoustic Stimulation 2021 No   In process    Assessment 2021 Reactive  Reactive In process    NST Read by 2021 OBRN   In process   Routine Prenatal on 06/10/2021   Component Date Value Ref Range Status    NST Locations 06/10/2021 [facility] Prenatal Clinic   Final    NST Indications 06/10/2021 Hypertension, benign essential - 642.03   Final    GDM, PPTD    NST Duration 06/10/2021 20  minutes Final    NST Baseline 06/10/2021 135   Final    FHR Variabilities 06/10/2021 Moderate (6-25bpm)  Minimal (<5bpm), Moderate (6-25bpm), Marked (>25bpm) Final    Accelerations > 15 BPM 06/10/2021 2   Final    Decelerations 06/10/2021 None  None, Variable, Early, Variable/Early, Other (comment) Final    Uterine Activity 06/10/2021 No   Final    Acoustic Stimulation 06/10/2021 No   Final    Assessment 06/10/2021 Reactive  Reactive Final    NST Read by 06/10/2021 [facility]   Final   Nurse Only on 06/07/2021   Component Date Value Ref Range Status    NST Locations 06/07/2021 [facility] Prenatal Clinic   Final    NST Indications 06/07/2021 Gestational diabetes - 648.83   Final    NST Duration 06/07/2021 20  minutes Final    NST Baseline 06/07/2021 140   Final    FHR Variabilities 06/07/2021 Moderate (6-25bpm)  Minimal (<5bpm), Moderate (6-25bpm), Marked (>25bpm) Final    Accelerations > 15 BPM 06/07/2021 2   Final    Decelerations 06/07/2021 None  None, Variable, Early, Variable/Early, Other (comment) Final    Uterine Activity 06/07/2021 No   Final    Acoustic Stimulation 06/07/2021 No   Final    Assessment 06/07/2021 Reactive  Reactive Final    NST Read by 06/07/2021 [facility]   Final   Routine Prenatal on 06/03/2021   Component Date Value Ref Range Status    NST Locations 06/03/2021 [facility] Prenatal Clinic   Final    NST Indications 06/03/2021 Hypertension, benign essential - 642.03   Final    gestational diabetes    NST Duration 06/03/2021 20  minutes Final    NST Baseline 06/03/2021 145   Final    FHR Variabilities 06/03/2021 Moderate (6-25bpm)  Minimal (<5bpm), Moderate (6-25bpm), Marked (>25bpm) Final    Accelerations > 15 BPM 06/03/2021 2   Final    Decelerations 06/03/2021 None  None, Variable, Early, Variable/Early, Other (comment) Final    Uterine Activity 06/03/2021 Yes   Final    Acoustic Stimulation 06/03/2021 No   Final    Assessment 06/03/2021 Reactive  Reactive Final    NST Read by 06/03/2021 [facility]   Final   Admission on 05/27/2021, Discharged on 05/27/2021   Component Date Value Ref Range Status    Color, UA 05/27/2021 YELLOW  YELLOW Final    Turbidity UA 05/27/2021 CLEAR  CLEAR Final    Glucose, Ur 05/27/2021 NEGATIVE  NEGATIVE Final    Bilirubin Urine 05/27/2021 NEGATIVE  NEGATIVE Final    Ketones, Urine 05/27/2021 NEGATIVE  NEGATIVE Final    Specific Gravity, UA 05/27/2021 1.011  1.000 - 1.030 Final    Urine Hgb 05/27/2021 NEGATIVE  NEGATIVE Final    pH, UA 05/27/2021 6.0  5.0 - 8.0 Final    Protein, UA 05/27/2021 NEGATIVE  NEGATIVE Final    Urobilinogen, Urine 05/27/2021 Normal  Normal Final    Nitrite, Urine 05/27/2021 NEGATIVE  NEGATIVE Final    Leukocyte Esterase, Urine 05/27/2021 NEGATIVE  NEGATIVE Final    Urinalysis Comments 05/27/2021 Microscopic exam not performed based on chemical results unless requested in original order.    Final    POC Glucose 05/27/2021 134* 65 - 105 mg/dL Final   Routine Prenatal on 05/26/2021   Component Date Value Ref Range Status    NST Locations 05/26/2021 [address] [facility] P[facility]   Final    NST Indications 05/26/2021 Hypertension, benign essential - 642.03   Final    gestational diabetes    NST Duration 05/26/2021 20  minutes Final    NST Baseline 05/26/2021 150   Final    FHR Variabilities 05/26/2021 Moderate (6-25bpm)  Minimal (<5bpm), Moderate (6-25bpm), Marked (>25bpm) Final    Accelerations > 15 BPM 05/26/2021 2   Final    Decelerations 05/26/2021 None  None, Variable, Early, Variable/Early, Other (comment) Final    Uterine Activity 05/26/2021 No   Final    Acoustic Stimulation 05/26/2021 No   Final    Assessment 05/26/2021 Reactive  Reactive Final    NST Read by 05/26/2021 [facility]   Final   ]    4- TDAP given      T-Dap Vaccine (27-36 weeks): completed    The patient was instructed on fetal weeks gestation of pregnancy  US FETAL BIOPHYS PROFILE W NON STRESS    Group B Strep,PCR   3. Incompetent cervix  US FETAL BIOPHYS PROFILE W NON STRESS   4. Vaz Cerclage placed 3/17/21  US FETAL BIOPHYS PROFILE W NON STRESS   5. Hypertension affecting pregnancy in third trimester  Eun 189   6. Insulin controlled gestational diabetes mellitus (GDM) in third trimester  [address]:  The patient will return to the office for her next visit in 1 weeks. See antepartum flow sheet.  Testing Indicated: Yes  Scheduled with Nursing-Pt notified: Yes  Pt is scheduled for IOL 37 weeks per [facility] recommendation with cerclage removal 2021    Medical Induction of Labor     Definition   In some cases, the doctor needs to help labor begin by using:   Medicine   Other procedures   This is done instead of waiting for your body to go into labor on its own. In the [facility], nearly one in five labors is induced. Reasons for Procedure   The most common reason to have an induction is that the pregnancy has gone two or more weeks past the due date. In this situation, the baby may:   Get too large for a vaginal delivery   Not be able to receive enough oxygen through the placenta (the organ that links the mother and the baby)   Other reasons for induction include:   Water breaks and contractions do not begin   High blood pressure or diabetes in the mother   Infection in the uterus   The baby is not growing properly   Low amniotic fluid level   Possible Complications   The same complications that may occur from a spontaneous delivery also apply to an induced delivery, as well as the following risks:   Stalled labor--If the medicine does not trigger labor, you may need a  section (). Strong contractions--The medicine that causes contractions could make them too strong. Although rare, this can lead to fetal distress and uterine rupture.  In the event that your contractions are too strong, your doctor will lower the dose or stop the medicine. Be sure to discuss these risks with your doctor before the procedure. What to Expect   Prior to Procedure   While the same instructions apply for an induced labor as for a spontaneous birth, there are some differences. Do not eat too much before arriving at the hospital. (It is okay to have clear fluids, though.) The medicines can create very strong contractions and could upset your stomach. Contractions slow the digestive process, so your stomach will remain full. This can cause a problem if you need general anesthesia . Description of the Procedure Cervical Ripening   To deliver your baby vaginally, the cervix will need to [address] 8.  This means it needs to soften, thin, and open to prepare for delivery. If your cervix is not doing this already, your doctor may aid this process by giving you medicine, which may be a:   Gel that is applied to the cervix   Suppository put in the vagina   Pill taken by mouth   The cervical ripening process can last for up to a few days. There are also procedures that your doctor may try to aid cervical ripening, such as:   Strip the membranes (separate your cervix from the tissues around the baby's head)   Expand a small balloon-tipped catheter in the uterus   Place small cylinders that contain a type of sponge-like seaweed into the uterus     Changes in the Cervix During Pregnancy       © 2011 [address] [place] [address].   Contractions   If contractions have not started once your cervix is ripe, your doctor will give you a drug that causes contractions. The drug is a man-made version of a hormone called oxytocin. This hormone is produced by your body during active labor. The drug will be adjusted during labor to strengthen or weaken the contractions. Once contractions begin, the labor and birth process will be the same as a spontaneous delivery.    Anesthesia   The same pain medicines are available for an induced labor as for a spontaneous delivery, including:   Pain medicine given into your vein   Epidural block   Spinal block   Local anesthesia   Immediately After Procedure   If everything goes well, after the induction you will vaginally deliver a healthy baby. How Long Will It Take? It can be hours to several days (very rarely) from the time you are induced until the delivery. If your cervix is not ripe when you are scheduled for the induction, labor and delivery could take 2-3 days. It could take longer for first-time mothers and for pre-term babies. How Much Will It Hurt? Labor causes severe pain. Talk to your doctor about ways to manage the pain. [address] Stay   The usual length of stay is 1-3 days. Your doctor may choose to keep you longer if you have any problems. Postoperative Care   The care after an induced labor is the same as for a spontaneous birth. Call Your Doctor   When you go home after having a vaginal delivery, call your doctor if any of the following occurs: An unexplained fever of 100.4°F (38°C) or above in the first two weeks   Soaking more than one sanitary napkin an hour or if the bleeding level increases   Wounds that become red, swollen, or drain pus   New pain, swelling, or tenderness in your legs   Hot-to-the-touch, significantly reddened, sore breasts   Any cracking or bleeding from the nipple or areola (the dark-colored area of the breast)   Foul-smelling vaginal discharge   Painful urination or a sudden urge to urinate, inability to control urination   Increasing pain in the vaginal area   Cough or chest pain, nausea, or vomiting   Depression, hallucinations, suicidal thoughts, or any thoughts of harming your baby   In case of an emergency, CALL 911 . Patient was seen with total face to face time of 30 minutes. More than 50% of this visit was on counseling and education regarding her    Diagnosis Orders   1.  High-risk pregnancy in third trimester  [address] W NON STRESS    Group B Strep,PCR   2. 35 weeks gestation of pregnancy  US FETAL BIOPHYS PROFILE W NON STRESS    Group B Strep,PCR   3. Incompetent cervix  US FETAL BIOPHYS PROFILE W NON STRESS   4. Vaz Cerclage placed 3/17/21  US FETAL BIOPHYS PROFILE W NON STRESS   5. Hypertension affecting pregnancy in third trimester  Jamesa 1898   6. Insulin controlled gestational diabetes mellitus (GDM) in third trimester  Jamesa 1898    and her options. She was also counseled on her preventative health maintenance recommendations and follow-up.

## 2021-06-18 ENCOUNTER — ROUTINE PRENATAL (OUTPATIENT)
Dept: PERINATAL CARE | Age: 31
End: 2021-06-18
Payer: COMMERCIAL

## 2021-06-18 VITALS
RESPIRATION RATE: 16 BRPM | SYSTOLIC BLOOD PRESSURE: 122 MMHG | TEMPERATURE: 97.1 F | DIASTOLIC BLOOD PRESSURE: 71 MMHG | HEIGHT: 64 IN | WEIGHT: 216 LBS | HEART RATE: 93 BPM | BODY MASS INDEX: 36.88 KG/M2

## 2021-06-18 DIAGNOSIS — O99.213 OBESITY AFFECTING PREGNANCY IN THIRD TRIMESTER: ICD-10-CM

## 2021-06-18 DIAGNOSIS — O09.299 CURRENT SINGLETON PREGNANCY WITH HISTORY OF CONGENITAL ANOMALY IN PRIOR CHILD, ANTEPARTUM: ICD-10-CM

## 2021-06-18 DIAGNOSIS — Z3A.35 35 WEEKS GESTATION OF PREGNANCY: ICD-10-CM

## 2021-06-18 DIAGNOSIS — O24.414 INSULIN CONTROLLED GESTATIONAL DIABETES MELLITUS (GDM) DURING PREGNANCY, ANTEPARTUM: ICD-10-CM

## 2021-06-18 DIAGNOSIS — O13.3 GESTATIONAL HYPERTENSION, THIRD TRIMESTER: Primary | ICD-10-CM

## 2021-06-18 LAB
DIRECT EXAM: ABNORMAL
Lab: ABNORMAL
SPECIMEN DESCRIPTION: ABNORMAL

## 2021-06-18 PROCEDURE — 76820 UMBILICAL ARTERY ECHO: CPT | Performed by: OBSTETRICS & GYNECOLOGY

## 2021-06-18 PROCEDURE — 76819 FETAL BIOPHYS PROFIL W/O NST: CPT | Performed by: OBSTETRICS & GYNECOLOGY

## 2021-06-21 ENCOUNTER — TELEPHONE (OUTPATIENT)
Dept: OBGYN CLINIC | Age: 31
End: 2021-06-21

## 2021-06-21 PROBLEM — O99.820 GBS (GROUP B STREPTOCOCCUS CARRIER), +RV CULTURE, CURRENTLY PREGNANT: Status: ACTIVE | Noted: 2021-06-21

## 2021-06-21 RX ORDER — LABETALOL 100 MG/1
TABLET, FILM COATED ORAL
Qty: 30 TABLET | Refills: 1 | Status: SHIPPED | OUTPATIENT
Start: 2021-06-21 | End: 2021-08-25

## 2021-06-22 LAB
CULTURE: ABNORMAL
Lab: ABNORMAL
SPECIMEN DESCRIPTION: ABNORMAL

## 2021-06-24 ENCOUNTER — ROUTINE PRENATAL (OUTPATIENT)
Dept: OBGYN CLINIC | Age: 31
End: 2021-06-24
Payer: COMMERCIAL

## 2021-06-24 VITALS
WEIGHT: 218.4 LBS | DIASTOLIC BLOOD PRESSURE: 89 MMHG | SYSTOLIC BLOOD PRESSURE: 132 MMHG | BODY MASS INDEX: 37.49 KG/M2 | HEART RATE: 102 BPM

## 2021-06-24 DIAGNOSIS — R60.0 BILATERAL LOWER EXTREMITY EDEMA: ICD-10-CM

## 2021-06-24 DIAGNOSIS — O09.93 ENCOUNTER FOR SUPERVISION OF HIGH RISK PREGNANCY IN THIRD TRIMESTER, ANTEPARTUM: Primary | ICD-10-CM

## 2021-06-24 DIAGNOSIS — O99.820 GBS (GROUP B STREPTOCOCCUS CARRIER), +RV CULTURE, CURRENTLY PREGNANT: ICD-10-CM

## 2021-06-24 DIAGNOSIS — Z3A.36 36 WEEKS GESTATION OF PREGNANCY: ICD-10-CM

## 2021-06-24 DIAGNOSIS — O16.3 HYPERTENSION AFFECTING PREGNANCY IN THIRD TRIMESTER: ICD-10-CM

## 2021-06-24 LAB
ACCELERATIONS > 10BPM: NORMAL
ACCELERATIONS > 15 BPM: 5
ACOUSTIC STIMULATION: NO
DECELERATIONS: NORMAL
FHR VARIABILITIES: NORMAL
NST ASSESSMENT: REACTIVE
NST BASELINE: 150
NST DURATION: 20 MINUTES
NST INDICATIONS: NORMAL
NST LOCATIONS: NORMAL
NST READ BY: NORMAL
NST RETURN: NORMAL
UTERINE ACTIVITY: NO

## 2021-06-24 PROCEDURE — 59025 FETAL NON-STRESS TEST: CPT | Performed by: CLINICAL NURSE SPECIALIST

## 2021-06-24 PROCEDURE — 0502F SUBSEQUENT PRENATAL CARE: CPT | Performed by: CLINICAL NURSE SPECIALIST

## 2021-06-24 NOTE — PROGRESS NOTES
Henrry Pitts is a 32 y.o. female 36w3d    [id number]    OB History    Para Term  AB Living   4 2 1 1 1 2   SAB TAB Ectopic Molar Multiple Live Births   1       0 2      # Outcome Date GA Lbr Chema/2nd Weight Sex Delivery Anes PTL Lv   4 Current            3 Term 18 38w3d 00:48 / 00:43 7 lb 6.9 oz (3.37 kg) F Vag-Spont EPI N CASTILLO   2  10/18/11 28w0d  3 lb 9 oz (1.616 kg) F Vag-Spont EPI Y CASTILLO      Complications: Placenta previa, Placental abruption in third trimester   1 SAB  13w0d             Birth Comments: had d&c       Blood pressure 132/89, pulse 102, weight 218 lb 6.4 oz (99.1 kg), last menstrual period 10/15/2020, not currently breastfeeding. The patient was seen and evaluated. There was positive fetal movements. No contractions or leakage of fluid. Signs and symptoms of labor were reviewed. The S/S of Pre-Eclampsia were reviewed with the patient in detail. She is to report any of these if they occur. She currently denies any of these. The patient was instructed on fetal kick counts and was given a kick sheet to complete every 8 hours. She was instructed that the baby should move at a minimum of ten times within one hour after a meal. The patient was instructed to lay down on her left side twenty minutes after eating and count movements for up to one hour with a target value of ten movements. She was instructed to notify the office if she did not make that target after two attempts or if after any attempt there was less than four movements. The patient reports that the targets have been made Yes. Allergies: Allergies as of 2021 - Fully Reviewed 2021   Allergen Reaction Noted    Cephalexin Anaphylaxis 2013       Group Beta Strep collection was completed. Yes    She has been instructed to call the office at anytime prior to going into the hospital so the on-call physician may direct her to the appropriate facility for care.  Exceptions were reviewed Electronically signed by: Cipriano Rosenberg, TERRELL None known in lifetime

## 2021-06-25 ENCOUNTER — ROUTINE PRENATAL (OUTPATIENT)
Dept: PERINATAL CARE | Age: 31
End: 2021-06-25
Payer: COMMERCIAL

## 2021-06-25 VITALS
BODY MASS INDEX: 37.22 KG/M2 | HEART RATE: 102 BPM | SYSTOLIC BLOOD PRESSURE: 126 MMHG | TEMPERATURE: 97.2 F | WEIGHT: 218 LBS | HEIGHT: 64 IN | RESPIRATION RATE: 16 BRPM | DIASTOLIC BLOOD PRESSURE: 83 MMHG

## 2021-06-25 DIAGNOSIS — Z3A.36 36 WEEKS GESTATION OF PREGNANCY: ICD-10-CM

## 2021-06-25 DIAGNOSIS — O24.414 INSULIN CONTROLLED GESTATIONAL DIABETES MELLITUS (GDM) DURING PREGNANCY, ANTEPARTUM: ICD-10-CM

## 2021-06-25 DIAGNOSIS — O99.213 OBESITY AFFECTING PREGNANCY IN THIRD TRIMESTER: ICD-10-CM

## 2021-06-25 DIAGNOSIS — O13.3 GESTATIONAL HYPERTENSION, THIRD TRIMESTER: Primary | ICD-10-CM

## 2021-06-25 PROCEDURE — 76819 FETAL BIOPHYS PROFIL W/O NST: CPT | Performed by: OBSTETRICS & GYNECOLOGY

## 2021-06-25 PROCEDURE — 76820 UMBILICAL ARTERY ECHO: CPT | Performed by: OBSTETRICS & GYNECOLOGY

## 2021-06-28 ENCOUNTER — ANESTHESIA EVENT (OUTPATIENT)
Dept: LABOR AND DELIVERY | Age: 31
End: 2021-06-28
Payer: COMMERCIAL

## 2021-06-28 ENCOUNTER — HOSPITAL ENCOUNTER (INPATIENT)
Age: 31
LOS: 2 days | Discharge: HOME | End: 2021-06-30
Attending: SPECIALIST | Admitting: OBSTETRICS & GYNECOLOGY
Payer: COMMERCIAL

## 2021-06-28 ENCOUNTER — APPOINTMENT (OUTPATIENT)
Dept: LABOR AND DELIVERY | Age: 31
End: 2021-06-28
Payer: COMMERCIAL

## 2021-06-28 ENCOUNTER — ANESTHESIA (OUTPATIENT)
Dept: LABOR AND DELIVERY | Age: 31
End: 2021-06-28
Payer: COMMERCIAL

## 2021-06-28 PROBLEM — Z3A.37 37 WEEKS GESTATION OF PREGNANCY: Status: ACTIVE | Noted: 2021-06-28

## 2021-06-28 PROBLEM — Z34.90 ENCOUNTER FOR INDUCTION OF LABOR: Status: RESOLVED | Noted: 2021-06-28 | Resolved: 2021-06-28

## 2021-06-28 PROBLEM — O09.93 ENCOUNTER FOR SUPERVISION OF HIGH RISK PREGNANCY IN THIRD TRIMESTER, ANTEPARTUM: Status: RESOLVED | Noted: 2018-08-09 | Resolved: 2021-06-28

## 2021-06-28 PROBLEM — Z34.90 ENCOUNTER FOR INDUCTION OF LABOR: Status: ACTIVE | Noted: 2021-06-28

## 2021-06-28 PROBLEM — Z3A.37 37 WEEKS GESTATION OF PREGNANCY: Status: RESOLVED | Noted: 2021-06-28 | Resolved: 2021-06-28

## 2021-06-28 LAB
-: ABNORMAL
ABO/RH: NORMAL
ABSOLUTE EOS #: 0.33 K/UL (ref 0–0.44)
ABSOLUTE IMMATURE GRANULOCYTE: 0.09 K/UL (ref 0–0.3)
ABSOLUTE LYMPH #: 2.86 K/UL (ref 1.1–3.7)
ABSOLUTE MONO #: 0.55 K/UL (ref 0.1–1.2)
ALBUMIN SERPL-MCNC: 3.1 G/DL (ref 3.5–5.2)
ALBUMIN/GLOBULIN RATIO: 1 (ref 1–2.5)
ALP BLD-CCNC: 154 U/L (ref 35–104)
ALT SERPL-CCNC: 16 U/L (ref 5–33)
AMORPHOUS: ABNORMAL
AMPHETAMINE SCREEN URINE: NEGATIVE
ANION GAP SERPL CALCULATED.3IONS-SCNC: 11 MMOL/L (ref 9–17)
ANTIBODY SCREEN: NEGATIVE
ARM BAND NUMBER: NORMAL
AST SERPL-CCNC: 23 U/L
BACTERIA: ABNORMAL
BARBITURATE SCREEN URINE: NEGATIVE
BASOPHILS # BLD: 1 % (ref 0–2)
BASOPHILS ABSOLUTE: 0.08 K/UL (ref 0–0.2)
BENZODIAZEPINE SCREEN, URINE: NEGATIVE
BILIRUB SERPL-MCNC: 0.15 MG/DL (ref 0.3–1.2)
BILIRUBIN URINE: NEGATIVE
BUN BLDV-MCNC: 9 MG/DL (ref 6–20)
BUN/CREAT BLD: ABNORMAL (ref 9–20)
BUPRENORPHINE URINE: NORMAL
CALCIUM SERPL-MCNC: 9 MG/DL (ref 8.6–10.4)
CANNABINOID SCREEN URINE: NEGATIVE
CASTS UA: ABNORMAL /LPF (ref 0–8)
CHLORIDE BLD-SCNC: 105 MMOL/L (ref 98–107)
CO2: 18 MMOL/L (ref 20–31)
COCAINE METABOLITE, URINE: NEGATIVE
COLOR: YELLOW
CREAT SERPL-MCNC: 0.56 MG/DL (ref 0.5–0.9)
CREATININE URINE: 59.2 MG/DL (ref 28–217)
CRYSTALS, UA: ABNORMAL /HPF
DIFFERENTIAL TYPE: ABNORMAL
EOSINOPHILS RELATIVE PERCENT: 3 % (ref 1–4)
EPITHELIAL CELLS UA: ABNORMAL /HPF (ref 0–5)
EXPIRATION DATE: NORMAL
GFR AFRICAN AMERICAN: >60 ML/MIN
GFR NON-AFRICAN AMERICAN: >60 ML/MIN
GFR SERPL CREATININE-BSD FRML MDRD: ABNORMAL ML/MIN/{1.73_M2}
GFR SERPL CREATININE-BSD FRML MDRD: ABNORMAL ML/MIN/{1.73_M2}
GLUCOSE BLD-MCNC: 146 MG/DL (ref 65–105)
GLUCOSE BLD-MCNC: 156 MG/DL (ref 70–99)
GLUCOSE BLD-MCNC: 65 MG/DL (ref 65–105)
GLUCOSE BLD-MCNC: 76 MG/DL (ref 65–105)
GLUCOSE BLD-MCNC: 80 MG/DL (ref 65–105)
GLUCOSE BLD-MCNC: 81 MG/DL (ref 65–105)
GLUCOSE BLD-MCNC: 93 MG/DL (ref 65–105)
GLUCOSE URINE: NEGATIVE
HCT VFR BLD CALC: 35.9 % (ref 36.3–47.1)
HEMOGLOBIN: 11.7 G/DL (ref 11.9–15.1)
IMMATURE GRANULOCYTES: 1 %
KETONES, URINE: NEGATIVE
LEUKOCYTE ESTERASE, URINE: ABNORMAL
LYMPHOCYTES # BLD: 25 % (ref 24–43)
MCH RBC QN AUTO: 32.1 PG (ref 25.2–33.5)
MCHC RBC AUTO-ENTMCNC: 32.6 G/DL (ref 28.4–34.8)
MCV RBC AUTO: 98.4 FL (ref 82.6–102.9)
MDMA URINE: NORMAL
METHADONE SCREEN, URINE: NEGATIVE
METHAMPHETAMINE, URINE: NORMAL
MONOCYTES # BLD: 5 % (ref 3–12)
MUCUS: ABNORMAL
NITRITE, URINE: NEGATIVE
NRBC AUTOMATED: 0 PER 100 WBC
OPIATES, URINE: NEGATIVE
OTHER OBSERVATIONS UA: ABNORMAL
OXYCODONE SCREEN URINE: NEGATIVE
PDW BLD-RTO: 13.2 % (ref 11.8–14.4)
PH UA: 6 (ref 5–8)
PHENCYCLIDINE, URINE: NEGATIVE
PLATELET # BLD: 191 K/UL (ref 138–453)
PLATELET ESTIMATE: ABNORMAL
PMV BLD AUTO: 12.4 FL (ref 8.1–13.5)
POTASSIUM SERPL-SCNC: 4.1 MMOL/L (ref 3.7–5.3)
PROPOXYPHENE, URINE: NORMAL
PROTEIN UA: NEGATIVE
RBC # BLD: 3.65 M/UL (ref 3.95–5.11)
RBC # BLD: ABNORMAL 10*6/UL
RBC UA: ABNORMAL /HPF (ref 0–4)
RENAL EPITHELIAL, UA: ABNORMAL /HPF
SARS-COV-2, RAPID: NOT DETECTED
SEG NEUTROPHILS: 66 % (ref 36–65)
SEGMENTED NEUTROPHILS ABSOLUTE COUNT: 7.72 K/UL (ref 1.5–8.1)
SODIUM BLD-SCNC: 134 MMOL/L (ref 135–144)
SPECIFIC GRAVITY UA: 1.01 (ref 1–1.03)
SPECIMEN DESCRIPTION: NORMAL
T. PALLIDUM, IGG: NONREACTIVE
TEST INFORMATION: NORMAL
TOTAL PROTEIN, URINE: 8 MG/DL
TOTAL PROTEIN: 6.1 G/DL (ref 6.4–8.3)
TRICHOMONAS: ABNORMAL
TRICYCLIC ANTIDEPRESSANTS, UR: NORMAL
TURBIDITY: CLEAR
URINE HGB: NEGATIVE
URINE TOTAL PROTEIN CREATININE RATIO: 0.14 (ref 0–0.2)
UROBILINOGEN, URINE: NORMAL
WBC # BLD: 11.6 K/UL (ref 3.5–11.3)
WBC # BLD: ABNORMAL 10*3/UL
WBC UA: ABNORMAL /HPF (ref 0–5)
YEAST: ABNORMAL

## 2021-06-28 PROCEDURE — 10907ZC DRAINAGE OF AMNIOTIC FLUID, THERAPEUTIC FROM PRODUCTS OF CONCEPTION, VIA NATURAL OR ARTIFICIAL OPENING: ICD-10-PCS | Performed by: OBSTETRICS & GYNECOLOGY

## 2021-06-28 PROCEDURE — 3700000025 EPIDURAL BLOCK: Performed by: ANESTHESIOLOGY

## 2021-06-28 PROCEDURE — 82570 ASSAY OF URINE CREATININE: CPT

## 2021-06-28 PROCEDURE — 87635 SARS-COV-2 COVID-19 AMP PRB: CPT

## 2021-06-28 PROCEDURE — 87086 URINE CULTURE/COLONY COUNT: CPT

## 2021-06-28 PROCEDURE — 85025 COMPLETE CBC W/AUTO DIFF WBC: CPT

## 2021-06-28 PROCEDURE — 1220000000 HC SEMI PRIVATE OB R&B

## 2021-06-28 PROCEDURE — 6360000002 HC RX W HCPCS: Performed by: ANESTHESIOLOGY

## 2021-06-28 PROCEDURE — 80307 DRUG TEST PRSMV CHEM ANLYZR: CPT

## 2021-06-28 PROCEDURE — 59400 OBSTETRICAL CARE: CPT | Performed by: OBSTETRICS & GYNECOLOGY

## 2021-06-28 PROCEDURE — 6370000000 HC RX 637 (ALT 250 FOR IP): Performed by: STUDENT IN AN ORGANIZED HEALTH CARE EDUCATION/TRAINING PROGRAM

## 2021-06-28 PROCEDURE — 96372 THER/PROPH/DIAG INJ SC/IM: CPT

## 2021-06-28 PROCEDURE — 3E033VJ INTRODUCTION OF OTHER HORMONE INTO PERIPHERAL VEIN, PERCUTANEOUS APPROACH: ICD-10-PCS | Performed by: OBSTETRICS & GYNECOLOGY

## 2021-06-28 PROCEDURE — 2580000003 HC RX 258: Performed by: STUDENT IN AN ORGANIZED HEALTH CARE EDUCATION/TRAINING PROGRAM

## 2021-06-28 PROCEDURE — 59871 REMOVE CERCLAGE SUTURE: CPT | Performed by: OBSTETRICS & GYNECOLOGY

## 2021-06-28 PROCEDURE — 0UQC7ZZ REPAIR CERVIX, VIA NATURAL OR ARTIFICIAL OPENING: ICD-10-PCS | Performed by: OBSTETRICS & GYNECOLOGY

## 2021-06-28 PROCEDURE — 96375 TX/PRO/DX INJ NEW DRUG ADDON: CPT

## 2021-06-28 PROCEDURE — 7200000001 HC VAGINAL DELIVERY

## 2021-06-28 PROCEDURE — 86901 BLOOD TYPING SEROLOGIC RH(D): CPT

## 2021-06-28 PROCEDURE — 2500000003 HC RX 250 WO HCPCS: Performed by: NURSE ANESTHETIST, CERTIFIED REGISTERED

## 2021-06-28 PROCEDURE — 82947 ASSAY GLUCOSE BLOOD QUANT: CPT

## 2021-06-28 PROCEDURE — 6360000002 HC RX W HCPCS

## 2021-06-28 PROCEDURE — 0UCC7ZZ EXTIRPATION OF MATTER FROM CERVIX, VIA NATURAL OR ARTIFICIAL OPENING: ICD-10-PCS | Performed by: OBSTETRICS & GYNECOLOGY

## 2021-06-28 PROCEDURE — 6360000002 HC RX W HCPCS: Performed by: STUDENT IN AN ORGANIZED HEALTH CARE EDUCATION/TRAINING PROGRAM

## 2021-06-28 PROCEDURE — 80053 COMPREHEN METABOLIC PANEL: CPT

## 2021-06-28 PROCEDURE — 81001 URINALYSIS AUTO W/SCOPE: CPT

## 2021-06-28 PROCEDURE — 88307 TISSUE EXAM BY PATHOLOGIST: CPT

## 2021-06-28 PROCEDURE — 86780 TREPONEMA PALLIDUM: CPT

## 2021-06-28 PROCEDURE — 86900 BLOOD TYPING SEROLOGIC ABO: CPT

## 2021-06-28 PROCEDURE — 84156 ASSAY OF PROTEIN URINE: CPT

## 2021-06-28 PROCEDURE — 86850 RBC ANTIBODY SCREEN: CPT

## 2021-06-28 PROCEDURE — 96374 THER/PROPH/DIAG INJ IV PUSH: CPT

## 2021-06-28 RX ORDER — IBUPROFEN 600 MG/1
600 TABLET ORAL EVERY 6 HOURS PRN
Qty: 60 TABLET | Refills: 1 | Status: SHIPPED | OUTPATIENT
Start: 2021-06-28 | End: 2022-02-28

## 2021-06-28 RX ORDER — MORPHINE SULFATE 2 MG/ML
INJECTION, SOLUTION INTRAMUSCULAR; INTRAVENOUS
Status: COMPLETED
Start: 2021-06-28 | End: 2021-06-28

## 2021-06-28 RX ORDER — ACETAMINOPHEN 500 MG
1000 TABLET ORAL EVERY 6 HOURS PRN
Status: DISCONTINUED | OUTPATIENT
Start: 2021-06-28 | End: 2021-06-30 | Stop reason: HOSPADM

## 2021-06-28 RX ORDER — ROPIVACAINE HYDROCHLORIDE 2 MG/ML
INJECTION, SOLUTION EPIDURAL; INFILTRATION; PERINEURAL
Status: COMPLETED
Start: 2021-06-28 | End: 2021-06-28

## 2021-06-28 RX ORDER — NALBUPHINE HCL 10 MG/ML
5 AMPUL (ML) INJECTION EVERY 4 HOURS PRN
Status: DISCONTINUED | OUTPATIENT
Start: 2021-06-28 | End: 2021-06-30 | Stop reason: HOSPADM

## 2021-06-28 RX ORDER — DOCUSATE SODIUM 100 MG/1
100 CAPSULE, LIQUID FILLED ORAL 2 TIMES DAILY
Status: DISCONTINUED | OUTPATIENT
Start: 2021-06-28 | End: 2021-06-30 | Stop reason: HOSPADM

## 2021-06-28 RX ORDER — SODIUM CHLORIDE 0.9 % (FLUSH) 0.9 %
5-40 SYRINGE (ML) INJECTION PRN
Status: DISCONTINUED | OUTPATIENT
Start: 2021-06-28 | End: 2021-06-30 | Stop reason: HOSPADM

## 2021-06-28 RX ORDER — DEXTROSE MONOHYDRATE 25 G/50ML
12.5 INJECTION, SOLUTION INTRAVENOUS PRN
Status: DISCONTINUED | OUTPATIENT
Start: 2021-06-28 | End: 2021-06-28

## 2021-06-28 RX ORDER — ONDANSETRON 2 MG/ML
4 INJECTION INTRAMUSCULAR; INTRAVENOUS EVERY 4 HOURS PRN
Status: DISCONTINUED | OUTPATIENT
Start: 2021-06-28 | End: 2021-06-30 | Stop reason: HOSPADM

## 2021-06-28 RX ORDER — GLUCAGON 1 MG/ML
1 KIT INJECTION PRN
Status: DISCONTINUED | OUTPATIENT
Start: 2021-06-28 | End: 2021-06-28

## 2021-06-28 RX ORDER — NICOTINE POLACRILEX 4 MG
15 LOZENGE BUCCAL PRN
Status: DISCONTINUED | OUTPATIENT
Start: 2021-06-28 | End: 2021-06-28

## 2021-06-28 RX ORDER — ONDANSETRON 2 MG/ML
4 INJECTION INTRAMUSCULAR; INTRAVENOUS EVERY 6 HOURS PRN
Status: DISCONTINUED | OUTPATIENT
Start: 2021-06-28 | End: 2021-06-28 | Stop reason: ALTCHOICE

## 2021-06-28 RX ORDER — SIMETHICONE 80 MG
80 TABLET,CHEWABLE ORAL EVERY 6 HOURS PRN
Status: DISCONTINUED | OUTPATIENT
Start: 2021-06-28 | End: 2021-06-30 | Stop reason: HOSPADM

## 2021-06-28 RX ORDER — MORPHINE SULFATE 2 MG/ML
2 INJECTION, SOLUTION INTRAMUSCULAR; INTRAVENOUS ONCE
Status: COMPLETED | OUTPATIENT
Start: 2021-06-28 | End: 2021-06-28

## 2021-06-28 RX ORDER — LANOLIN 72 %
OINTMENT (GRAM) TOPICAL PRN
Status: DISCONTINUED | OUTPATIENT
Start: 2021-06-28 | End: 2021-06-30 | Stop reason: HOSPADM

## 2021-06-28 RX ORDER — KETOROLAC TROMETHAMINE 30 MG/ML
INJECTION, SOLUTION INTRAMUSCULAR; INTRAVENOUS
Status: COMPLETED
Start: 2021-06-28 | End: 2021-06-28

## 2021-06-28 RX ORDER — SODIUM CHLORIDE 0.9 % (FLUSH) 0.9 %
5-40 SYRINGE (ML) INJECTION EVERY 12 HOURS SCHEDULED
Status: DISCONTINUED | OUTPATIENT
Start: 2021-06-28 | End: 2021-06-28

## 2021-06-28 RX ORDER — LIDOCAINE HYDROCHLORIDE AND EPINEPHRINE 15; 5 MG/ML; UG/ML
INJECTION, SOLUTION EPIDURAL PRN
Status: DISCONTINUED | OUTPATIENT
Start: 2021-06-28 | End: 2021-06-28 | Stop reason: SDUPTHER

## 2021-06-28 RX ORDER — HYDROCORTISONE 25 MG/G
CREAM TOPICAL
Status: DISCONTINUED | OUTPATIENT
Start: 2021-06-28 | End: 2021-06-30 | Stop reason: HOSPADM

## 2021-06-28 RX ORDER — BISACODYL 10 MG
10 SUPPOSITORY, RECTAL RECTAL DAILY PRN
Status: DISCONTINUED | OUTPATIENT
Start: 2021-06-28 | End: 2021-06-30 | Stop reason: HOSPADM

## 2021-06-28 RX ORDER — SODIUM CHLORIDE 9 MG/ML
25 INJECTION, SOLUTION INTRAVENOUS PRN
Status: DISCONTINUED | OUTPATIENT
Start: 2021-06-28 | End: 2021-06-28

## 2021-06-28 RX ORDER — ACETAMINOPHEN 500 MG
1000 TABLET ORAL EVERY 6 HOURS PRN
Status: DISCONTINUED | OUTPATIENT
Start: 2021-06-28 | End: 2021-06-28

## 2021-06-28 RX ORDER — NALOXONE HYDROCHLORIDE 0.4 MG/ML
0.4 INJECTION, SOLUTION INTRAMUSCULAR; INTRAVENOUS; SUBCUTANEOUS PRN
Status: DISCONTINUED | OUTPATIENT
Start: 2021-06-28 | End: 2021-06-30 | Stop reason: HOSPADM

## 2021-06-28 RX ORDER — IBUPROFEN 600 MG/1
600 TABLET ORAL EVERY 6 HOURS
Status: DISCONTINUED | OUTPATIENT
Start: 2021-06-29 | End: 2021-06-30 | Stop reason: HOSPADM

## 2021-06-28 RX ORDER — DEXTROSE MONOHYDRATE 50 MG/ML
100 INJECTION, SOLUTION INTRAVENOUS PRN
Status: DISCONTINUED | OUTPATIENT
Start: 2021-06-28 | End: 2021-06-28

## 2021-06-28 RX ORDER — ONDANSETRON 2 MG/ML
4 INJECTION INTRAMUSCULAR; INTRAVENOUS EVERY 6 HOURS PRN
Status: DISCONTINUED | OUTPATIENT
Start: 2021-06-28 | End: 2021-06-30 | Stop reason: HOSPADM

## 2021-06-28 RX ORDER — DOCUSATE SODIUM 100 MG/1
100 CAPSULE, LIQUID FILLED ORAL 2 TIMES DAILY PRN
Qty: 60 CAPSULE | Refills: 0 | Status: SHIPPED | OUTPATIENT
Start: 2021-06-28 | End: 2021-08-25

## 2021-06-28 RX ORDER — DIPHENHYDRAMINE HCL 25 MG
25 TABLET ORAL EVERY 4 HOURS PRN
Status: DISCONTINUED | OUTPATIENT
Start: 2021-06-28 | End: 2021-06-30 | Stop reason: HOSPADM

## 2021-06-28 RX ORDER — LABETALOL 100 MG/1
100 TABLET, FILM COATED ORAL EVERY 12 HOURS SCHEDULED
Status: DISCONTINUED | OUTPATIENT
Start: 2021-06-28 | End: 2021-06-30 | Stop reason: HOSPADM

## 2021-06-28 RX ORDER — KETOROLAC TROMETHAMINE 30 MG/ML
30 INJECTION, SOLUTION INTRAMUSCULAR; INTRAVENOUS ONCE
Status: COMPLETED | OUTPATIENT
Start: 2021-06-28 | End: 2021-06-28

## 2021-06-28 RX ORDER — SODIUM CHLORIDE 9 MG/ML
INJECTION, SOLUTION INTRAVENOUS CONTINUOUS
Status: DISCONTINUED | OUTPATIENT
Start: 2021-06-28 | End: 2021-06-28

## 2021-06-28 RX ADMIN — SODIUM CHLORIDE: 9 INJECTION, SOLUTION INTRAVENOUS at 18:56

## 2021-06-28 RX ADMIN — INSULIN HUMAN 15 UNITS: 100 INJECTION, SUSPENSION SUBCUTANEOUS at 21:04

## 2021-06-28 RX ADMIN — LIDOCAINE HYDROCHLORIDE,EPINEPHRINE BITARTRATE 3 ML: 15; .005 INJECTION, SOLUTION EPIDURAL; INFILTRATION; INTRACAUDAL; PERINEURAL at 13:47

## 2021-06-28 RX ADMIN — KETOROLAC TROMETHAMINE 30 MG: 30 INJECTION, SOLUTION INTRAMUSCULAR; INTRAVENOUS at 22:43

## 2021-06-28 RX ADMIN — MORPHINE SULFATE 2 MG: 2 INJECTION, SOLUTION INTRAMUSCULAR; INTRAVENOUS at 10:49

## 2021-06-28 RX ADMIN — VANCOMYCIN HYDROCHLORIDE 1250 MG: 10 INJECTION, POWDER, LYOPHILIZED, FOR SOLUTION INTRAVENOUS at 21:28

## 2021-06-28 RX ADMIN — SODIUM CHLORIDE: 9 INJECTION, SOLUTION INTRAVENOUS at 09:16

## 2021-06-28 RX ADMIN — Medication 1 MILLI-UNITS/MIN: at 11:30

## 2021-06-28 RX ADMIN — VANCOMYCIN HYDROCHLORIDE 2000 MG: 1 INJECTION, POWDER, LYOPHILIZED, FOR SOLUTION INTRAVENOUS at 09:28

## 2021-06-28 RX ADMIN — LABETALOL HCL 100 MG: 100 TABLET, FILM COATED ORAL at 22:10

## 2021-06-28 RX ADMIN — ACETAMINOPHEN 1000 MG: 500 TABLET ORAL at 23:43

## 2021-06-28 RX ADMIN — ONDANSETRON 4 MG: 2 INJECTION INTRAMUSCULAR; INTRAVENOUS at 20:53

## 2021-06-28 RX ADMIN — ROPIVACAINE HYDROCHLORIDE 12 ML/HR: 2 INJECTION, SOLUTION EPIDURAL; INFILTRATION at 13:51

## 2021-06-28 ASSESSMENT — PAIN SCALES - GENERAL
PAINLEVEL_OUTOF10: 9
PAINLEVEL_OUTOF10: 7
PAINLEVEL_OUTOF10: 7

## 2021-06-28 NOTE — FLOWSHEET NOTE
Pt presents to L&D per ambulatory for scheduled IOL for gestational diabetes and gestational hypertension. Denies LOF, vaginal bleeding. Reports + fetal movement and \"lisa shankar\" contractions. To 711, gowned and urine specimen requested.

## 2021-06-28 NOTE — PROGRESS NOTES
Labor Progress Note    Kwame Hairston is a 32 y.o. female [id number] at 37w0d  The patient was seen and examined. Her pain is well controlled with epidural in place. She reports fetal movement is present, complains of contractions, complains of loss of fluid, denies vaginal bleeding. Patient denies any headache, visual changes, difficulty breathing, RUQ pain, N/V, F/C, and pain/swelling in lower extremities. Denies any dysuria or vaginal discharge. SVE performed and patient tolerated exam well. Vital Signs:  Vitals:    06/28/21 1945 06/28/21 1950 06/28/21 1955 06/28/21 2000   BP:       Pulse:       Resp:       Temp:       TempSrc:       SpO2: 99% 100% 99% 100%   Weight:       Height:             FHT: 130bpm, moderate variability, accelerations present, decelerations absent  Contractions: regular, every 2-3 minutes    Chaperone for Intimate Exam: Chaperone was present for entire exam, Chaperone Name: Tanya Crenshaw RN  Cervical Exam: 8 cm dilated, 90 effaced, 0 station  Pitocin: @ 18 mu/min    Membranes: Ruptured clear fluid  Scalp Electrode in place: absent  Intrauterine Pressure Catheter in Place: absent        Assessment/Plan:  Kwame Hairston is a 32 y.o. female [id number] at 37w0d admitted for IOL 2/2 gHTN and GDMA2   - GBS positive, vancomycin for GBS prophylaxis   - Intermittent elevated BP, no severe range BP   - PreE labs wnl, P/C 0.14   - Denies s/s of preE   - Epidural in place   - S/p AROM (clr) @ 130   - Continue pitocin per protocol   - Continue to monitor closely.          Dr. Kinjal Sepulveda updated and in agreement with plan    Dayna Bingham DO  Ob/Gyn Resident  6/28/2021, 8:38 PM

## 2021-06-28 NOTE — PROGRESS NOTES
OB/GYN Resident Interval Note    Patient seen and examined. She confirmed that her Vaz cerclage was still in place. Patient was placed into stirrups and speculum was inserted. Cervix was visualized but suture was unable to be seen. Spec removed and cervix was palpated. She was dilated to 2 cm. Suture was able to be palpated and it was embedded into the anterior lip of cervix. A small segment was not embedded and this area was cut with scissors. In house attending physician was called into room and she performed another pelvic exam. She was able to cut another part of the suture and the cervix dilated to 4 cm. Again spec was placed and no pieces of suture were seen. Cervix was re palpated and suture is not palpated circumferentially only along the anterior lip. Patient's cervix is favorable and she will likely be able to have a vaginal delivery. After delivery with epidural still in place, a thorough cervical exam will be performed to check for lacerations.      Dr Aly Silva updated      Jamar Dennis DO   OB/GYN Resident  Pager [phone]  [facility]  6/28/2021, 7:10 PM

## 2021-06-28 NOTE — FLOWSHEET NOTE
[facility] 6626 at bedside. Epidural procedure explained, risks discussed. Pt verbalizes consent for epidural.   0108 patient positioned for epidural.1339 Time out completed. 1347 catheter placed. 1347 test dose given, HR 90.  Epidural catheter taped and secured per anesthesia. 1350 to low fowlers with left uterine displacement. 1355 loading dose given. 1357 pump initiated. Pt tolerated procedure well.

## 2021-06-28 NOTE — PROGRESS NOTES
Labor Progress Note    Vimal Bernal is a 32 y.o. female [id number] at 37w0d  The patient was seen and examined. Her pain is well controlled with epidural. She reports fetal movement is present, complains of contractions, complains of loss of fluid, denies vaginal bleeding.        Vital Signs:  Vitals:    06/28/21 1500 06/28/21 1530 06/28/21 1600 06/28/21 1630   BP: 122/71 121/72 (!) 112/58 114/82   Pulse: 93 83 80 97   Resp:   16    Temp:  97.9 °F (36.6 °C)     SpO2: 97% 97% 96% 96%   Weight:       Height:           FHT: 125, moderate variability, accelerations present, decelerations absent  Contractions: not tracing well    Chaperone for Intimate Exam: Chaperone was present for entire exam, Chaperone Name: Sergio Linares RN  Cervical Exam: 5 cm dilated, 90 effaced, 0 station  Pitocin: @ 12 mu/min    Membranes: Ruptured clear fluid  Scalp Electrode in place: absent  Intrauterine Pressure Catheter in Place: absent    Interventions: none    Assessment/Plan:  Vimal Bernal is a 32 y.o. female [id number] at 37w0d admitted for IOL 2/2 gHTN and GDMA2   - GBS positive, vancomycin for GBS prophylaxis   - Few elevated Bps, none in severe range   - Epidural    - AROM (clr) @ 1300    - Pit per protocol   - Will continue to monitor closely    Attending updated and in agreement with plan    Sree Abad DO  Ob/Gyn Resident  6/28/2021, 4:51 PM

## 2021-06-28 NOTE — H&P
OBSTETRICAL HISTORY [facility]    Date: 2021       Time: 8:30 AM   Patient Name: Karen Yuen     Patient : 1990  Room/Bed: 192/9453-78    Admission Date/Time: 2021  8:04 AM      CC: IOL 2/2 gHTN and GDMA2      HPI: Karen Yuen is a 32 y.o. [id number] at 37w0d who presents to L&D for an IOL 2/2 gHTN and GDMA2. She has no complaints at this time. She currently has a cerclage in place due to history of  delivery in first pregnancy and cerclage in last pregnancy. She also was diagnosed with Marcy Snipe and is currently on 0/0/6 of Novolog and 15u of NPH. She states her fasting's are in the 90s and 2hr PP are 120-130. She is a known gestational diabetic and denies s/s of PreE. Patient denies vaginal bleeding, irritation, itching, hematuria, dysuria, chest pain, SOB, F/C, N/V/D. The patient reports fetal movement is present, denies contractions, denies loss of fluid, denies vaginal bleeding. DATING:  LMP: Patient's last menstrual period was 10/15/2020 (approximate).   Estimated Date of Delivery: 21   Based on: early ultrasound, at 7 2/7 weeks GA    PREGNANCY RISK FACTORS:  Patient Active Problem List   Diagnosis    H/O PTD (G2 @ 28w)    Short cervical length during pregnancy in second trimester    Encounter for supervision of high risk pregnancy in third trimester, antepartum    S/P Vaz Cervical cerclage placed 18, removed     Noncompliance    Poor historian    History of cervical cerclage, currently pregnant, second trimester    History of gestational hypertension    Bilateral lower extremity edema    Incompetent cervix    Vaz Cerclage placed 3/17/21    Hypertension affecting pregnancy in third trimester    Need for Tdap vaccination    Insulin controlled gestational diabetes mellitus (GDM) in third trimester    GBS (group B Streptococcus carrier), +RV culture, currently pregnant    37 weeks gestation of pregnancy  Steroids Given In This Pregnancy:  no     REVIEW OF SYSTEMS:   Constitutional: negative fever, negative chills, negative weight changes   HEENT: negative visual disturbances, negative headaches, negative dizziness, negative hearing loss  Breast: Negative breast abnormalities, negative breast lumps, negative nipple discharge  Respiratory: negative dyspnea, negative cough, negative SOB  Cardiovascular: negative chest pain,  negative palpitations, negative arrhythmia, negative syncope   Gastrointestinal: negative abdominal pain, negative RUQ pain, negative N/V, negative diarrhea, negative constipation, negative bowel changes, negative heartburn   Genitourinary: negative dysuria, negative hematuria, negative urinary incontinence, negative vaginal discharge, negative vaginal bleeding or spotting  Dermatological: negative rash, negative pruritis, negative mole or other skin changes  Hematologic: negative bruising  Immunologic/Lymphatic: negative recent illness, negative recent sick contact  Musculoskeletal: negative back pain, negative myalgias, negative arthralgias  Neurological:  negative dizziness, negative migraines, negative seizures, negative weakness  Behavior/Psych: negative depression, negative anxiety, negative SI, negative HI    OBSTETRICAL HISTORY:   OB History    Para Term  AB Living   4 2 1 1 1 2   SAB TAB Ectopic Molar Multiple Live Births   1 0 0 0 0 2      # Outcome Date GA Lbr Chema/2nd Weight Sex Delivery Anes PTL Lv   4 Current            3 Term 18 38w3d 00:48 / 00:43 7 lb 6.9 oz (3.37 kg) F Vag-Spont EPI N CASTILLO      Name: Jordan Escobedo: 8  Apgar5: 9   2  10/18/11 28w0d  3 lb 9 oz (1.616 kg) F Vag-Spont EPI Y CASTILLO      Complications: Placenta previa, Placental abruption in third trimester   1 SAB  13w0d             Birth Comments: had d&c       PAST MEDICAL HISTORY:   has a past medical history of Abnormal Pap smear, Anemia, Arthritis, Complication of anesthesia, Deviated septum, Diabetes mellitus ([facility] [address] Endometriosis, GERD (gastroesophageal reflux disease), Hypertension, Postpartum depression, Prolonged emergence from general anesthesia, Seasonal affective disorder ([facility] [address] and Spondylisthesis. PAST SURGICAL HISTORY:   has a past surgical history that includes Dilation and curettage of uterus; Cholecystectomy; Colonoscopy; Upper gastrointestinal endoscopy; Colposcopy; hernia repair (2012); pr cerclage cervix w preg,vag apprch (N/A, 6/1/2018); and Cervical Cerclage (N/A, 3/17/2021). ALLERGIES:  is allergic to cephalexin. MEDICATIONS:  Prior to Admission medications    Medication Sig Start Date End Date Taking? Authorizing Provider   labetalol (NORMODYNE) 100 MG tablet TAKE 1 TABLET BY MOUTH TWICE DAILY 6/21/21   GOSIA Guerra CNP   promethazine (PHENERGAN) 25 MG tablet Take 1 tablet by mouth every 6 hours as needed for Nausea 6/15/21   GOSIA Guerra CNP   HUMULIN N KWIKPEN 100 UNIT/ML injection pen Inject 15 Units into the skin nightly 6/9/21   Historical Provider, MD   HUMALOG KWIKPEN 100 UNIT/ML SOPN Inject 6 Units into the skin daily (before lunch) Daily before dinner only 6/8/21   Historical Provider, MD   aspirin EC 81 MG EC tablet Take 1 tablet by mouth daily 4/12/21   Tye Guaman MD   famotidine (PEPCID) 20 MG tablet Take 1 tablet by mouth 2 times daily 3/24/21   GOSIA Guerra CNP   Blood Pressure KIT 1 each by Does not apply route daily 3/10/21   GOSIA Guerra CNP   Prenatal Vit-Fe Fumarate-FA (PNV PRENATAL PLUS MULTIVITAMIN) 27-1 MG TABS Take 1 tablet by mouth daily 11/18/20 6/25/21  GOSIA Guerra CNP       FAMILY HISTORY:  family history includes Cancer in her father; Diabetes in her father; Stroke in her brother and father. SOCIAL HISTORY:   reports that she quit smoking about 6 months ago. She smoked 0.50 packs per day.  She has never used smokeless tobacco. She reports previous Glucose Tolerance Test: 148  Early 3 hour Glucose Tolerance Test: Fastin; 1 hour: 150; 2 hour  99; 3 hour: 94  1 hour Glucose Tolerance Test: 201    Group B Strep: positive PCR on 21  Cystic Fibrosis Screen: negative  First Trimester Screen: not done  MSAFP/Multiple Markers: Normal   Non-Invasive Prenatal Testing: not done  Anatomy US: Anterior placenta, 3VC, Female, Normal anatomy    ASSESSMENT & PLAN:  Laura Oh is a 32 y.o. female [id number] at 37w0d    - GBS positive / Rh positive / R immune   - Vancomycin for GBS prophylaxis      IOL  Lenor Priscila Cai   - VSS, afebrile   - cEFM/ TOCO: Cat 1, rare contractions   - LBUS : Cephalic, EFW 3#42   - Induction Method: Pitocin   - Diet: CLD   - IVF  ml/hr   - CBC, T&S, Tpal, UA, UDS (Informed consent obtained) ordered   - COVID ordered    Cerclage in place 3/17/21   - Removed today by Senior resident. Please see separate note for pelvic exam details   - Placed for hx of  delivery w/ short cervix of 22-23 mm, and hx of cerclage    Hx PTD (G2 @ 28w)   - The patient states she went into  labor for this pregnancy and delivered after 5 days of admission.     S/p Vaz Cerclage    - The patient received a cerclage in 2nd pregnancy due to hx of  delivery and short cervix of 17 mm     Hx gHTN (G1)    FHx Hand Deformity (G1)   - Patients daughter his missing digits and partial digits   - Normal Anatomy scan    BMI 37.4    Patient Active Problem List    Diagnosis Date Noted    GBS (group B Streptococcus carrier), +RV culture, currently pregnant 2021     Priority: High     2021 treat in labor per protocol      37 weeks gestation of pregnancy 2021    Insulin controlled gestational diabetes mellitus (GDM) in third trimester 2021    Need for Tdap vaccination 2021    Hypertension affecting pregnancy in third trimester 2021     GESTATIONAL HTN      Vaz Cerclage placed 3/17/21 03/17/2021     #5 Ethibond suture, knot @ 12      Incompetent cervix     Bilateral lower extremity edema 03/10/2021    History of cervical cerclage, currently pregnant, second trimester 12/30/2020    History of gestational hypertension 12/30/2020    S/P Vaz Cervical cerclage placed 6/1/18, removed 9/13 08/13/2018     Knot at 12 o'clock      Noncompliance 08/13/2018    Poor historian 08/13/2018    Encounter for supervision of high risk pregnancy in third trimester, antepartum 08/09/2018    Short cervical length during pregnancy in second trimester 05/31/2018     16mm CL found 8/23/18 in office  17 mm CL found 5/31/18 in office. Patient currently taking Kat Guess H/O PTD (G2 @ 28w) 05/15/2018       Plan discussed with Dr. Nancy Martines, who is agreeable. Steroids given this admission: No    Risks, benefits, alternatives and possible complications have been discussed in detail with the patient. Admission, and post admission procedures and expectations were discussed in detail. All questions were answered.     Attending's Name: Dr. Irish Hall DO  Ob/Gyn Resident  6/28/2021, 8:30 AM

## 2021-06-28 NOTE — PROGRESS NOTES
Pharmacy Note  Vancomycin Consult    Nai Garibay is a 32 y.o. female started on Vancomycin for GBS prophylaxis; consult received from Dr. Mercedes Murrieta to manage therapy. Also receiving the following antibiotics: none. Patient Active Problem List   Diagnosis    H/O PTD (G2 @ 28w)    Short cervical length during pregnancy in second trimester    Encounter for supervision of high risk pregnancy in third trimester, antepartum    S/P Vaz Cervical cerclage placed 6/1/18, removed 9/13    Noncompliance    Poor historian    History of cervical cerclage, currently pregnant, second trimester    History of gestational hypertension    Bilateral lower extremity edema    Incompetent cervix    Vaz Cerclage placed 3/17/21    Hypertension affecting pregnancy in third trimester    Need for Tdap vaccination    Insulin controlled gestational diabetes mellitus (GDM) in third trimester    GBS (group B Streptococcus carrier), +RV culture, currently pregnant    37 weeks gestation of pregnancy    Encounter for induction of labor     Allergies:  Cephalexin     Temp max: 97.5    Recent Labs     06/28/21  0925   BUN 9   CREATININE 0.56   WBC 11.6*     No intake or output data in the 24 hours ending 06/28/21 1022  Culture Date      Source                       Results      Ht Readings from Last 1 Encounters:   06/28/21 5' 4\" (1.626 m)        Wt Readings from Last 1 Encounters:   06/28/21 218 lb (98.9 kg)       Body mass index is 37.42 kg/m². Estimated Creatinine Clearance: 166 mL/min (based on SCr of 0.56 mg/dL). Goal Trough Level: 10-15  mcg/mL (AUC around 400)    Assessment/Plan:  Will initiate Vancomycin with a one time loading dose of 2000 mg x1, followed by 1250 mg IV every 12 hours. Timing of trough level will be determined based on culture results, renal function, and clinical response. Thank you for the consult. Will continue to follow.     Spike Caal, Zenaida 6/28/2021 10:24 AM

## 2021-06-28 NOTE — FLOWSHEET NOTE
Dr. Sharon Boas and Dr. Reji Gurrola at bedside to remove cerclage. Cerclage difficult and unable to remove. Pt given 2 mg of morphine as charted. Pt tolerates procedure well.

## 2021-06-28 NOTE — ANESTHESIA PRE PROCEDURE
Department of Anesthesiology  Preprocedure Note       Name:  Chance Posada   Age:  32 y.o.  :  1990                                          MRN:  [id number]         Date:  2021      Surgeon: * No surgeons listed *    Procedure: * No procedures listed *    Medications prior to admission:   Prior to Admission medications    Medication Sig Start Date End Date Taking?  Authorizing Provider   labetalol (NORMODYNE) 100 MG tablet TAKE 1 TABLET BY MOUTH TWICE DAILY 21  Yes GOSIA Orozco CNP   promethazine (PHENERGAN) 25 MG tablet Take 1 tablet by mouth every 6 hours as needed for Nausea 6/15/21  Yes GOSIA Orozco CNP   HUMULIN N KWIKPEN 100 UNIT/ML injection pen Inject 15 Units into the skin nightly 21  Yes Historical Provider, MD   HUMALOG KWIKPEN 100 UNIT/ML SOPN Inject 6 Units into the skin daily (before lunch) Daily before dinner only 21  Yes Historical Provider, MD   aspirin EC 81 MG EC tablet Take 1 tablet by mouth daily 21  Yes Esmer Rodriguez MD   famotidine (PEPCID) 20 MG tablet Take 1 tablet by mouth 2 times daily 3/24/21   GOSIA Orozco CNP   Blood Pressure KIT 1 each by Does not apply route daily 3/10/21   GOSIA Orozco CNP   Prenatal Vit-Fe Fumarate-FA (PNV PRENATAL PLUS MULTIVITAMIN) 27-1 MG TABS Take 1 tablet by mouth daily 20  GOSIA Orozco CNP       Current medications:    Current Facility-Administered Medications   Medication Dose Route Frequency Provider Last Rate Last Admin    sodium chloride flush 0.9 % injection 5-40 mL  5-40 mL Intravenous 2 times per day Meri Gambino DO        sodium chloride flush 0.9 % injection 5-40 mL  5-40 mL Intravenous PRN Meri Gambino DO        0.9 % sodium chloride infusion  25 mL Intravenous PRN Meri Gambino DO        ondansetron [facility]) injection 4 mg  4 mg Intravenous Q6H PRN Meri Gambino DO        diphenhydrAMINE (BENADRYL) tablet 25 mg  25 mg Oral Q4H PRN Luis Walton, DO        acetaminophen (TYLENOL) tablet 1,000 mg  1,000 mg Oral Q6H PRN Luis Walton DO        0.9 % sodium chloride infusion   Intravenous Continuous Luis Walton  mL/hr at 06/28/21 0916 New Bag at 06/28/21 0916    vancomycin (VANCOCIN) 1250 mg in dextrose 5 % 250 mL IVPB  1,250 mg Intravenous Q12H Luis Walton DO        vancomycin [facility]) intermittent dosing (placeholder)   Other RX Placeholder Luis Walton DO        labetalol (NORMODYNE) tablet 100 mg  100 mg Oral 2 times per day Jenny Arthur DO        insulin NPH (HUMULIN N;NOVOLIN N) injection vial 15 Units  15 Units Subcutaneous Nightly Luis Walton DO        insulin lispro (HUMALOG) injection vial 6 Units  6 Units Subcutaneous Dinner Jenny Arthur DO        glucose (GLUTOSE) 40 % oral gel 15 g  15 g Oral PRN Luis Walton DO        dextrose 50 % IV solution  12.5 g Intravenous PRN Luis Walton DO        glucagon injection 1 mg  1 mg Intramuscular PRN Luis Walton DO        dextrose 5 % solution  100 mL/hr Intravenous PRN Luis Walton DO        oxytocin (PITOCIN) 30 units in 500 mL infusion  1-20 julieth-units/min Intravenous Continuous Ramos Muff, DO 6 mL/hr at 06/28/21 1300 6 julieth-units/min at 06/28/21 1300    ropivacaine (NAROPIN) 0.2% injection 0.2%             ropivacaine 0.2% in sodium chloride 0.9% (OB) epidural 100 mL  10 mL/hr Epidural Continuous Kelsey Espinoza MD        [facility]) injection 0.4 mg  0.4 mg Intravenous PRN Kelsey Espinoza MD        nalbuphine (NUBAIN) injection 5 mg  5 mg Intravenous Q4H PRN Kelsey Espinoza MD        ondansetron [facility]) injection 4 mg  4 mg Intravenous Q6H PRN Kelsey Espinoza MD           Allergies:     Allergies   Allergen Reactions    Cephalexin Anaphylaxis       Problem List:    Patient Active Problem List   Diagnosis Code    H/O PTD (G2 @ 28w) O09.212    Short cervical length during pregnancy in second trimester O30.80    Encounter for supervision of high risk pregnancy in third trimester, antepartum O09.93    S/P Vaz Cervical cerclage placed 6/1/18, removed 9/13 O34.30    Noncompliance Z91.19    Poor historian Z78.9    History of cervical cerclage, currently pregnant, second trimester O09.292, Z98.890    History of gestational hypertension Z87.59    Bilateral lower extremity edema R60.0    Incompetent cervix N88.3    Vaz Cerclage placed 3/17/21 O34.30    Hypertension affecting pregnancy in third trimester O16.3    Need for Tdap vaccination Z23    Insulin controlled gestational diabetes mellitus (GDM) in third trimester O24.414    GBS (group B Streptococcus carrier), +RV culture, currently pregnant O99.820    37 weeks gestation of pregnancy Z3A.37    Encounter for induction of labor Z34.90       Past Medical History:        Diagnosis Date    Abnormal Pap smear     approx.  2012    Anemia     not currently taking iron    Arthritis     Complication of anesthesia     low blood pressure and combative upon awaking from general anesthesia    Deviated septum     Diabetes mellitus ([address]     gestational    Endometriosis     GERD (gastroesophageal reflux disease)     Hypertension     gestational last pregnancy 2018 preeclampsia    Postpartum depression     Prolonged emergence from general anesthesia     See blow     Seasonal affective disorder ([address]     and does not take any meds    Spondylisthesis        Past Surgical History:        Procedure Laterality Date    CERVICAL CERCLAGE N/A 3/17/2021    Vaz Cervical Cerclage placement performed by Anthony Martins MD at [address] [place] [address] [address]  [address]    umbilical    MS CERCLAGE CERVIX W PREG, [address] N/A 6/1/2018    CERVIX CERCLAGE performed by Anthony Martins MD at [place] [address] Component Value Date    PREGTESTUR positive (A) 11/18/2020    HCG NEGATIVE 10/19/2019    HCGQUANT 96,202 (H) 12/13/2020        ABGs: No results found for: PHART, PO2ART, UIK2WWK, YUV8DEY, BEART, D4USUSAR     Type & Screen (If Applicable):  No results found for: LABABO, LABRH    Drug/Infectious Status (If Applicable):  No results found for: HIV, HEPCAB    COVID-19 Screening (If Applicable):   Lab Results   Component Value Date    COVID19 Not Detected 06/28/2021           Anesthesia Evaluation  Patient summary reviewed and Nursing notes reviewed no history of anesthetic complications:   Airway: Mallampati: II  TM distance: >3 FB   Neck ROM: full  Mouth opening: > = 3 FB Dental: normal exam         Pulmonary:Negative Pulmonary ROS and normal exam                               Cardiovascular:  Exercise tolerance: good (>4 METS),   (+) hypertension: no interval change,                   Neuro/Psych:   (+) psychiatric history: stable without treatment            GI/Hepatic/Renal:   (+) GERD: no interval change,           Endo/Other:    (+) Diabetesno interval change, , .                 Abdominal:   (+) obese,           Vascular: negative vascular ROS. Other Findings:             Anesthesia Plan      epidural     ASA 2             Anesthetic plan and risks discussed with patient.                       GOSIA Garvey - CRNA   6/28/2021

## 2021-06-28 NOTE — PROGRESS NOTES
Patient Name: Annie Marin  Patient : 1990  Room/Bed: 5019/9268-97  Admission Date/Time: 2021  8:04 AM  MRN #: [id number]  [facility] #: [id number]    Date: 2021  Time: 1:10 PM        Annie Marin is a 32 y.o. female  OB History    Para Term  AB Living   4 2 1 1 1 2   SAB TAB Ectopic Molar Multiple Live Births   1 0 0 0 0 2      # Outcome Date GA Lbr Chema/2nd Weight Sex Delivery Anes PTL Lv   4 Current            3 Term 18 38w3d 00:48 / 00:43 7 lb 6.9 oz (3.37 kg) F Vag-Spont EPI N CASTILLO      Name: Miracle Lara: Samuel  Apgar5: 9   2  10/18/11 28w0d  3 lb 9 oz (1.616 kg) F Vag-Spont EPI Y CASTILLO      Complications: Placenta previa, Placental abruption in third trimester   1 SAB  13w0d             Birth Comments: had d&c       Gestational Age:  37w0d      The patient was seen and examined. The details of her pelvic exam can be found in the EPIC flow section of her EMR. Her pain  is well controlled. Pt is feeling pressure. Pt requesting epidural.  The baby is moving well. Tracing Review (Date of Tracing): There Is moderate Variability  Vitals:    21 0822 21 1135 21 1200 21 1300   BP:  (!) 155/92 130/74 (!) 146/67   Pulse:  82 101 79   Resp:  18 16 16   Temp:       Weight: 218 lb (98.9 kg)      Height: 5' 4\" (1.626 m)        FHT's are 130  The tracing is Category 1 .     Intervention:  None      Membranes Are: Ruptured clear fluid  Scalp Electrode in place: NO  Intrauterine Pressure Catheter in Place: No        4 Week Lab Review:  Admission on 2021   Component Date Value Ref Range Status    Expiration Date 2021,2359   Final    Arm Band Number 2021 [id number]   Final    ABO/Rh 2021 A POSITIVE   Final    Antibody Screen 2021 NEGATIVE   Final    Amphetamine Screen, Ur 2021 NEGATIVE  NEGATIVE Final    Comment:       (Positive cutoff 1000 ng/mL)                  Barbiturate Screen, Ur 06/28/2021 NEGATIVE  NEGATIVE Final    Comment:       (Positive cutoff 200 ng/mL)                  Benzodiazepine Screen, Urine 06/28/2021 NEGATIVE  NEGATIVE Final    Comment:       (Positive cutoff 200 ng/mL)                  Cocaine Metabolite, Urine 06/28/2021 NEGATIVE  NEGATIVE Final    Comment:       (Positive cutoff 300 ng/mL)                  Methadone Screen, Urine 06/28/2021 NEGATIVE  NEGATIVE Final    Comment:       (Positive cutoff 300 ng/mL)                  Opiates, Urine 06/28/2021 NEGATIVE  NEGATIVE Final    Comment:       (Positive cutoff 300 ng/mL)                  Phencyclidine, Urine 06/28/2021 NEGATIVE  NEGATIVE Final    Comment:       (Positive cutoff 25 ng/mL)                  Propoxyphene, Urine 06/28/2021 NOT REPORTED  NEGATIVE Final    Cannabinoid Scrn, Ur 06/28/2021 NEGATIVE  NEGATIVE Final    Comment:       (Positive cutoff 50 ng/mL)                  Oxycodone Screen, Ur 06/28/2021 NEGATIVE  NEGATIVE Final    Comment:       (Positive cutoff 100 ng/mL)                  Methamphetamine, Urine 06/28/2021 NOT REPORTED  NEGATIVE Final    Tricyclic Antidepressants, Urine 06/28/2021 NOT REPORTED  NEGATIVE Final    MDMA, Urine 06/28/2021 NOT REPORTED  NEGATIVE Final    Buprenorphine Urine 06/28/2021 NOT REPORTED  NEGATIVE Final    Test Information 06/28/2021 Assay provides medical screening only. The absence of expected drug(s) and/or metabolite(s) may indicate diluted or adulterated urine, limitations of testing or timing of collection. Final    Comment: Testing for legal purposes should be confirmed by another method. To request confirmation   of test result, please call the lab within 7 days of sample submission.  T. pallidum, IgG 06/28/2021 NONREACTIVE  NONREACTIVE Final    Comment:       T. pallidum antibodies are not detected. There is no serological evidence of infection with T. pallidum (early primary syphilis   cannot be excluded).   Retest in 2-4 weeks if syphilis is clinically suspect.             WBC 06/28/2021 11.6* 3.5 - 11.3 k/uL Final    RBC 06/28/2021 3.65* 3.95 - 5.11 m/uL Final    Hemoglobin 06/28/2021 11.7* 11.9 - 15.1 g/dL Final    Hematocrit 06/28/2021 35.9* 36.3 - 47.1 % Final    MCV 06/28/2021 98.4  82.6 - 102.9 fL Final    MCH 06/28/2021 32.1  25.2 - 33.5 pg Final    MCHC 06/28/2021 32.6  28.4 - 34.8 g/dL Final    RDW 06/28/2021 13.2  11.8 - 14.4 % Final    Platelets 10/08/6826 191  138 - 453 k/uL Final    MPV 06/28/2021 12.4  8.1 - 13.5 fL Final    NRBC Automated 06/28/2021 0.0  0.0 per 100 WBC Final    Differential Type 06/28/2021 NOT REPORTED   Final    WBC Morphology 06/28/2021 NOT REPORTED   Final    RBC Morphology 06/28/2021 NOT REPORTED   Final    Platelet Estimate 92/49/6745 NOT REPORTED   Final    Seg Neutrophils 06/28/2021 66* 36 - 65 % Final    Lymphocytes 06/28/2021 25  24 - 43 % Final    Monocytes 06/28/2021 5  3 - 12 % Final    Eosinophils % 06/28/2021 3  1 - 4 % Final    Basophils 06/28/2021 1  0 - 2 % Final    Immature Granulocytes 06/28/2021 1* 0 % Final    Segs Absolute 06/28/2021 7.72  1.50 - 8.10 k/uL Final    Absolute Lymph # 06/28/2021 2.86  1.10 - 3.70 k/uL Final    Absolute Mono # 06/28/2021 0.55  0.10 - 1.20 k/uL Final    Absolute Eos # 06/28/2021 0.33  0.00 - 0.44 k/uL Final    Basophils Absolute 06/28/2021 0.08  0.00 - 0.20 k/uL Final    Absolute Immature Granulocyte 06/28/2021 0.09  0.00 - 0.30 k/uL Final    Color, UA 06/28/2021 YELLOW  YELLOW Final    Turbidity UA 06/28/2021 CLEAR  CLEAR Final    Glucose, Ur 06/28/2021 NEGATIVE  NEGATIVE Final    Bilirubin Urine 06/28/2021 NEGATIVE  NEGATIVE Final    Ketones, Urine 06/28/2021 NEGATIVE  NEGATIVE Final    Specific [place], UA 06/28/2021 1.012  1.005 - 1.030 Final    Urine Hgb 06/28/2021 NEGATIVE  NEGATIVE Final    pH, UA 06/28/2021 6.0  5.0 - 8.0 Final    Protein, UA 06/28/2021 NEGATIVE  NEGATIVE Final    Urobilinogen, Urine 06/28/2021 Normal  Normal Final    Nitrite, Urine 06/28/2021 NEGATIVE  NEGATIVE Final    Leukocyte Esterase, Urine 06/28/2021 SMALL* NEGATIVE Final    - 06/28/2021        Final    WBC, UA 06/28/2021 2 TO 5  0 - 5 /HPF Final    RBC, UA 06/28/2021 None  0 - 4 /HPF Final    Reference range defined for non-centrifuged specimen.  Casts UA 06/28/2021 NOT REPORTED  0 - 8 /LPF Final    Crystals, UA 06/28/2021 NOT REPORTED  None /HPF Final    Epithelial Cells UA 06/28/2021 0 TO 2  0 - 5 /HPF Final    Renal Epithelial, UA 06/28/2021 NOT REPORTED  0 /HPF Final    Bacteria, UA 06/28/2021 NOT REPORTED  None Final    Mucus, UA 06/28/2021 NOT REPORTED  None Final    Trichomonas, UA 06/28/2021 NOT REPORTED  None Final    Amorphous, UA 06/28/2021 NOT REPORTED  None Final    Other Observations UA 06/28/2021 NOT REPORTED  NOT REQ.  Final    Yeast, UA 06/28/2021 NOT REPORTED  None Final    Glucose 06/28/2021 156* 70 - 99 mg/dL Final    BUN 06/28/2021 9  6 - 20 mg/dL Final    CREATININE 06/28/2021 0.56  0.50 - 0.90 mg/dL Final    Bun/Cre Ratio 06/28/2021 NOT REPORTED  9 - 20 Final    Calcium 06/28/2021 9.0  8.6 - 10.4 mg/dL Final    Sodium 06/28/2021 134* 135 - 144 mmol/L Final    Potassium 06/28/2021 4.1  3.7 - 5.3 mmol/L Final    Chloride 06/28/2021 105  98 - 107 mmol/L Final    CO2 06/28/2021 18* 20 - 31 mmol/L Final    Anion Gap 06/28/2021 11  9 - 17 mmol/L Final    Alkaline Phosphatase 06/28/2021 154* 35 - 104 U/L Final    ALT 06/28/2021 16  5 - 33 U/L Final    AST 06/28/2021 23  <32 U/L Final    Total Bilirubin 06/28/2021 0.15* 0.3 - 1.2 mg/dL Final    Total Protein 06/28/2021 6.1* 6.4 - 8.3 g/dL Final    Albumin 06/28/2021 3.1* 3.5 - 5.2 g/dL Final    Albumin/Globulin Ratio 06/28/2021 1.0  1.0 - 2.5 Final    GFR Non- 06/28/2021 >60  >60 mL/min Final    GFR  06/28/2021 >60  >60 mL/min Final    GFR Comment 06/28/2021        Final    Comment: Average GFR for 30-36 years old: 107 mL/min/1.73sq m  Chronic Kidney Disease:   <60 mL/min/1.73sq m  Kidney failure:   <15 mL/min/1.73sq m              eGFR calculated using average adult body mass. Additional eGFR calculator available at:        [employer]            GFR Staging 06/28/2021 NOT REPORTED   Final    Specimen Description 06/28/2021 . NASOPHARYNGEAL SWAB   Final    SARS-CoV-2, Rapid 06/28/2021 Not Detected  Not Detected Final    Comment:       Rapid NAAT:  The specimen is NEGATIVE for SARS-CoV-2, the novel coronavirus associated with   COVID-19. The ID NOW COVID-19 assay is designed to detect the virus that causes COVID-19 in patients   with signs and symptoms of infection who are suspected of COVID-19. An individual without symptoms of COVID-19 and who is not shedding SARS-CoV-2 virus would   expect to have a negative (not detected) result in this assay. Negative results should be treated as presumptive and, if inconsistent with clinical signs   and symptoms or necessary for patient management,  should be tested with an alternative molecular assay. Negative results do not preclude   SARS-CoV-2 infection and   should not be used as the sole basis for patient management decisions.          Fact sheet for Healthcare Providers: BuildHer.es  Fact sheet for Patients: BuildHer.es          Methodology: Isothermal Nucleic Acid Amplification      POC Glucose 06/28/2021 146* 65 - 105 mg/dL Final   Routine Prenatal on 06/24/2021   Component Date Value Ref Range Status    NST Locations 06/24/2021 [facility] Prenatal Clinic   In process    NST Indications 06/24/2021 Hypertension, benign essential - 642.03   In process    NST Duration 06/24/2021 20  minutes In process    NST Baseline 06/24/2021 150   In process    FHR Variabilities 06/24/2021 Moderate (6-25bpm)  Minimal (<5bpm), Moderate (6-25bpm), Marked (>25bpm) In process    Accelerations > 15 BPM 06/24/2021 5   In process    Decelerations 06/24/2021 None  None, Variable, Early, Variable/Early, Other (comment) In process    Uterine Activity 06/24/2021 No   In process    Acoustic Stimulation 06/24/2021 No   In process    Assessment 06/24/2021 Reactive  Reactive In process    NST Read by 06/24/2021 OBRN   In process   Hospital Outpatient Visit on 06/17/2021   Component Date Value Ref Range Status    Specimen Description 06/17/2021 . VAGINAL/PERIRECTAL   Final    Special Requests 06/17/2021 NOT REPORTED   Final    Direct Exam 06/17/2021 POSITIVE: GROUP B STREPTOCOCCAL DNA DETECTED BY NUCLEIC ACID AMPLIFICATION. This test detects GBS DNA in vaginal/rectal specimens to identify colonization. A positive result will not distinguish colonization from infection. *  Final    Specimen Description 06/17/2021 . VAGINAL/PERIRECTAL   Final    Special Requests 06/17/2021 NOT REPORTED   Final    Culture 06/17/2021 Group B Streptococci was unable to be cultured for susceptibility testing. This may be the result of increased detection sensitivity of PCR over culture resulting in a positive PCR result without culture growth. *  Final   Nurse Only on 06/14/2021   Component Date Value Ref Range Status    NST Locations 06/14/2021 [facility] Prenatal Clinic   In process    NST Indications 06/14/2021 Gestational diabetes - 648.83   In process    NST Duration 06/14/2021 20  minutes In process    NST Baseline 06/14/2021 135   In process    FHR Variabilities 06/14/2021 Moderate (6-25bpm)  Minimal (<5bpm), Moderate (6-25bpm), Marked (>25bpm) In process    Accelerations > 15 BPM 06/14/2021 2   In process    Decelerations 06/14/2021 None  None, Variable, Early, Variable/Early, Other (comment) In process    Uterine Activity 06/14/2021 No   In process    Acoustic Stimulation 06/14/2021 No   In process    Assessment 06/14/2021 Reactive  Reactive In process    NST Read by 06/14/2021 OBRN   In process   Routine Prenatal on 06/10/2021   Component Date Value Ref Range Status    NST Locations 06/10/2021 [facility] Prenatal Clinic   Final    NST Indications 06/10/2021 Hypertension, benign essential - 642.03   Final    GDM, PPTD    NST Duration 06/10/2021 20  minutes Final    NST Baseline 06/10/2021 135   Final    FHR Variabilities 06/10/2021 Moderate (6-25bpm)  Minimal (<5bpm), Moderate (6-25bpm), Marked (>25bpm) Final    Accelerations > 15 BPM 06/10/2021 2   Final    Decelerations 06/10/2021 None  None, Variable, Early, Variable/Early, Other (comment) Final    Uterine Activity 06/10/2021 No   Final    Acoustic Stimulation 06/10/2021 No   Final    Assessment 06/10/2021 Reactive  Reactive Final    NST Read by 06/10/2021 [facility]   Final   Nurse Only on 06/07/2021   Component Date Value Ref Range Status    NST Locations 06/07/2021 [facility] Prenatal Clinic   Final    NST Indications 06/07/2021 Gestational diabetes - 648.83   Final    NST Duration 06/07/2021 20  minutes Final    NST Baseline 06/07/2021 140   Final    FHR Variabilities 06/07/2021 Moderate (6-25bpm)  Minimal (<5bpm), Moderate (6-25bpm), Marked (>25bpm) Final    Accelerations > 15 BPM 06/07/2021 2   Final    Decelerations 06/07/2021 None  None, Variable, Early, Variable/Early, Other (comment) Final    Uterine Activity 06/07/2021 No   Final    Acoustic Stimulation 06/07/2021 No   Final    Assessment 06/07/2021 Reactive  Reactive Final    NST Read by 06/07/2021 [facility]   Final   Routine Prenatal on 06/03/2021   Component Date Value Ref Range Status    NST Locations 06/03/2021 [facility] Prenatal Clinic   Final    NST Indications 06/03/2021 Hypertension, benign essential - 642.03   Final    gestational diabetes    NST Duration 06/03/2021 20  minutes Final    NST Baseline 06/03/2021 145   Final    FHR Variabilities 06/03/2021 Moderate (6-25bpm)  Minimal (<5bpm), Moderate (6-25bpm), Marked (>25bpm) Final    Accelerations > 15 BPM 2021 2   Final    Decelerations 2021 None  None, Variable, Early, Variable/Early, Other (comment) Final    Uterine Activity 2021 Yes   Final    Acoustic Stimulation 2021 No   Final    Assessment 2021 Reactive  Reactive Final    NST Read by 2021 OBRN   Final   ]    BP (!) 146/67   Pulse 79   Temp 97.5 °F (36.4 °C)   Resp 16   Ht 5' 4\" (1.626 m)   Wt 218 lb (98.9 kg)   LMP 10/15/2020 (Approximate)   BMI 37.42 kg/m²       Pelvic Exam:  Cervix Check:     DILATION:  5 cm   EFFACEMENT:   80%   STATION:  0 cm   CONSISTENCY:  soft   POSITION:  anterior    FETAL POSITION: Cephalic  Cerclage not able to be palpated or visualized     Assessment:  Jacqueline Goldsmith is a 32 y.o. female [id number] 37w0d     2.   OB History    Para Term  AB Living   4 2 1 1 1 2   SAB TAB Ectopic Molar Multiple Live Births   1       0 2      # Outcome Date GA Lbr Chema/2nd Weight Sex Delivery Anes PTL Lv   4 Current            3 Term 18 38w3d 00:48 / 00:43 7 lb 6.9 oz (3.37 kg) F Vag-Spont EPI N CASTILLO   2  10/18/11 28w0d  3 lb 9 oz (1.616 kg) F Vag-Spont EPI Y CASTILLO      Complications: Placenta previa, Placental abruption in third trimester   1 SAB  13w0d             Birth Comments: had d&c         3. 37 weeks gestation of pregnancy [Z3A.37]      4.    Patient Active Problem List    Diagnosis Date Noted    GBS (group B Streptococcus carrier), +RV culture, currently pregnant 2021     Priority: High     2021 treat in labor per protocol      Insulin controlled gestational diabetes mellitus (GDM) in third trimester 2021     Priority: High    Hypertension affecting pregnancy in third trimester 2021     Priority: High     GESTATIONAL HTN      S/P Vaz Cervical cerclage placed 18, removed 2018     Priority: High     Knot at 12 o'clock      37 weeks gestation of pregnancy 2021    Encounter for induction of labor 2021    Need for Tdap vaccination 04/26/2021    Vaz Cerclage placed 3/17/21 03/17/2021     #5 Ethibond suture, knot @ 12      Incompetent cervix     Bilateral lower extremity edema 03/10/2021    History of cervical cerclage, currently pregnant, second trimester 12/30/2020    History of gestational hypertension 12/30/2020    Noncompliance 08/13/2018    Poor historian 08/13/2018    Encounter for supervision of high risk pregnancy in third trimester, antepartum 08/09/2018    Short cervical length during pregnancy in second trimester 05/31/2018     16mm CL found 8/23/18 in office  17 mm CL found 5/31/18 in office. Patient currently taking Kat Guess H/O PTD (G2 @ 28w) 05/15/2018       5. GHTN          Plan:   1. Continue with current care plan  2.  Pitocin per protocol        Electronically signed by Su Meza DO on 6/28/2021 at 1:10 PM

## 2021-06-28 NOTE — PROGRESS NOTES
Labor Progress Note    Dav Meehan is a 32 y.o. female [id number] at 37w0d  The patient was seen and examined. Her pain is well controlled. She reports fetal movement is present, complains of contractions, complains of loss of fluid, denies vaginal bleeding. Vital Signs:  Vitals:    06/28/21 1730 06/28/21 1800 06/28/21 1830 06/28/21 1940   BP: 122/82 118/66 114/72    Pulse: 74 75 74    Resp: 16 14     Temp: 97.5 °F (36.4 °C)   97.7 °F (36.5 °C)   TempSrc: Oral      SpO2: 96% 97% 96%    Weight:       Height:             FHT: 130, moderate variability, accelerations present, decelerations absent  Contractions: regular, every 2 minutes    Chaperone for Intimate Exam: Chaperone was present for entire exam, Chaperone Name: Manoj Cobian RN  Cervical Exam: 7 cm dilated, 90 effaced, 0 station  Pitocin: @ 18 mu/min    Membranes: Ruptured clear fluid  Scalp Electrode in place: absent  Intrauterine Pressure Catheter in Place: absent    Interventions: none    Assessment/Plan:  Dav Meehan is a 32 y.o. female [id number] at 37w0d admitted for IOL 2/2 gHTN and GDMA2              - GBS positive, vancomycin for GBS prophylaxis              - Intermittent elevated BP, no severe range BP              - PreE labs wnl, P/C 0.14              - Denies s/s of preE              - Epidural in place              - S/p AROM (clr) @ 130              - Continue pitocin per protocol              - Continue to monitor closely.          Attending updated and in agreement with plan    Dario Lackey DO  Ob/Gyn Resident  6/28/2021, 7:56 PM

## 2021-06-28 NOTE — DISCHARGE SUMMARY
Obstetric Discharge Summary  [facility]    Patient Name: Mechelle Benjamin  Patient : 1990  Primary Care Physician: No primary care provider on file. Admit Date: 2021    Principal Diagnosis: IUP at 37w0d, admitted for IOL 2/2 gHTN and Tabby Pew     Her pregnancy has been complicated by:   Patient Active Problem List   Diagnosis    H/O PTD (G2 @ 28w)    Short cervical length during pregnancy in second trimester    S/P Vaz Cervical cerclage placed 18, removed     Noncompliance    Poor historian    History of cervical cerclage, currently pregnant, second trimester    History of gestational hypertension    Bilateral lower extremity edema    Incompetent cervix    Vaz Cerclage placed 3/17/21    Hypertension affecting pregnancy in third trimester    Need for Tdap vaccination    Insulin controlled gestational diabetes mellitus (GDM) in third trimester    GBS (group B Streptococcus carrier), +RV culture, currently pregnant     21 M Apg 8/9 Wt 8#14     Infection Present?: No  Hospital Acquired: No    Surgical Operations & Procedures:  Analgesia: epidural  Delivery Type: Spontaneous Vaginal Delivery: See Labor and Delivery Summary   Laceration(s): Right cervical laceration repaired with 2-0 Vicryl     Consultations: Anesthesia    Pertinent Findings & Procedures:   Mechelle Benjamin is a 32 y.o. female [id number] at 37w0d admitted for IOL 2/2 gHTN and GDMA2. PreE labs were wnl with P/C of 0.14. Patient had a Vaz cerclage in place which was partially removed at the beginning of her induction with knot removed after delivery. She received Vanc, Pitocin, AROM (clear), and epidural.    She delivered by spontaneous vaginal a Live Born infant on 21. Information for the patient's :  Marquis Ramos [[id number]]   male   Birth Weight: 8 lb 14.2 oz (4.03 kg)       Apgars: 8 at 1 minute and 9 at 5 minutes.      Postpartum course:     PPD#1: Hgb dropped from 11.7>9.3. Patient will be discharged home with iron supplementation. Course of patient: uncomplicated    Discharge to: Home    Readmission planned: no     Recommendations on Discharge:     Medications:      Medication List      START taking these medications    docusate sodium 100 MG capsule  Commonly known as: Colace  Take 1 capsule by mouth 2 times daily as needed for Constipation     ferrous sulfate 325 (65 Fe) MG EC tablet  Commonly known as: Fe Tabs  Take 1 tablet by mouth 2 times daily     ibuprofen 600 MG tablet  Commonly known as: ADVIL;MOTRIN  Take 1 tablet by mouth every 6 hours as needed for Pain        CONTINUE taking these medications    aspirin EC 81 MG EC tablet  Take 1 tablet by mouth daily     Blood Pressure Kit  1 each by Does not apply route daily     famotidine 20 MG tablet  Commonly known as: PEPCID  Take 1 tablet by mouth 2 times daily     HumaLOG KwikPen 100 UNIT/ML Sopn  Generic drug: insulin lispro (1 Unit Dial)     HumuLIN N KwikPen 100 UNIT/ML injection pen  Generic drug: insulin NPH     labetalol 100 MG tablet  Commonly known as: NORMODYNE  TAKE 1 TABLET BY MOUTH TWICE DAILY     PNV Prenatal Plus Multivitamin 27-1 MG Tabs  Take 1 tablet by mouth daily     promethazine 25 MG tablet  Commonly known as: PHENERGAN  Take 1 tablet by mouth every 6 hours as needed for Nausea           Where to Get Your Medications      You can get these medications from any pharmacy    Bring a paper prescription for each of these medications  · docusate sodium 100 MG capsule  · ferrous sulfate 325 (65 Fe) MG EC tablet  · ibuprofen 600 MG tablet       Activity: pelvic rest x 6 weeks, no lifting greater than 15 lbs  Diet: regular diet  Follow up: 1 week for postpartum visit and BP check.  Patient will need 2-hr GTT at 6 week PP appointment    Condition on discharge: good    Discharge date: 6/30/21    Dee Dee Causey DO  Ob/Gyn Resident    Comments:  Home care and follow-up care were reviewed. Pelvic rest, and birth control were reviewed. Signs and symptoms of mastitis and post partum depression were reviewed. The patient is to notify her physician if any of these occur. The patient was counseled on secondary smoke risks and the increased risk of sudden infant death syndrome and respiratory problems to her baby with exposure. She was counseled on various alternate recommendations to decrease the exposure to secondary smoke to her children.

## 2021-06-29 LAB
CULTURE: NORMAL
HCT VFR BLD CALC: 30.2 % (ref 36.3–47.1)
HEMOGLOBIN: 9.3 G/DL (ref 11.9–15.1)
Lab: NORMAL
SPECIMEN DESCRIPTION: NORMAL

## 2021-06-29 PROCEDURE — 85018 HEMOGLOBIN: CPT

## 2021-06-29 PROCEDURE — 6370000000 HC RX 637 (ALT 250 FOR IP): Performed by: STUDENT IN AN ORGANIZED HEALTH CARE EDUCATION/TRAINING PROGRAM

## 2021-06-29 PROCEDURE — 36415 COLL VENOUS BLD VENIPUNCTURE: CPT

## 2021-06-29 PROCEDURE — 85014 HEMATOCRIT: CPT

## 2021-06-29 PROCEDURE — 1220000000 HC SEMI PRIVATE OB R&B

## 2021-06-29 RX ORDER — LANOLIN ALCOHOL/MO/W.PET/CERES
325 CREAM (GRAM) TOPICAL 2 TIMES DAILY
Qty: 90 TABLET | Refills: 3 | Status: SHIPPED | OUTPATIENT
Start: 2021-06-29 | End: 2021-08-25

## 2021-06-29 RX ORDER — HYDROCODONE BITARTRATE AND ACETAMINOPHEN 5; 325 MG/1; MG/1
1 TABLET ORAL ONCE
Status: COMPLETED | OUTPATIENT
Start: 2021-06-29 | End: 2021-06-29

## 2021-06-29 RX ADMIN — HYDROCODONE BITARTRATE AND ACETAMINOPHEN 1 TABLET: 5; 325 TABLET ORAL at 00:45

## 2021-06-29 RX ADMIN — IBUPROFEN 600 MG: 600 TABLET, FILM COATED ORAL at 07:11

## 2021-06-29 RX ADMIN — LABETALOL HCL 100 MG: 100 TABLET, FILM COATED ORAL at 09:43

## 2021-06-29 RX ADMIN — IBUPROFEN 600 MG: 600 TABLET, FILM COATED ORAL at 20:11

## 2021-06-29 RX ADMIN — ACETAMINOPHEN 1000 MG: 500 TABLET ORAL at 09:43

## 2021-06-29 RX ADMIN — LABETALOL HCL 100 MG: 100 TABLET, FILM COATED ORAL at 20:11

## 2021-06-29 RX ADMIN — DOCUSATE SODIUM 100 MG: 100 CAPSULE, LIQUID FILLED ORAL at 09:43

## 2021-06-29 RX ADMIN — IBUPROFEN 600 MG: 600 TABLET, FILM COATED ORAL at 14:12

## 2021-06-29 RX ADMIN — DOCUSATE SODIUM 100 MG: 100 CAPSULE, LIQUID FILLED ORAL at 20:11

## 2021-06-29 ASSESSMENT — PAIN DESCRIPTION - PROGRESSION
CLINICAL_PROGRESSION: GRADUALLY IMPROVING

## 2021-06-29 ASSESSMENT — PAIN DESCRIPTION - LOCATION
LOCATION: ABDOMEN

## 2021-06-29 ASSESSMENT — PAIN - FUNCTIONAL ASSESSMENT
PAIN_FUNCTIONAL_ASSESSMENT: ACTIVITIES ARE NOT PREVENTED

## 2021-06-29 ASSESSMENT — PAIN DESCRIPTION - ORIENTATION
ORIENTATION: MID;LOWER

## 2021-06-29 ASSESSMENT — PAIN DESCRIPTION - PAIN TYPE
TYPE: ACUTE PAIN

## 2021-06-29 ASSESSMENT — PAIN DESCRIPTION - FREQUENCY
FREQUENCY: INTERMITTENT

## 2021-06-29 ASSESSMENT — PAIN SCALES - GENERAL
PAINLEVEL_OUTOF10: 6
PAINLEVEL_OUTOF10: 5
PAINLEVEL_OUTOF10: 8
PAINLEVEL_OUTOF10: 3
PAINLEVEL_OUTOF10: 2
PAINLEVEL_OUTOF10: 5
PAINLEVEL_OUTOF10: 5
PAINLEVEL_OUTOF10: 6

## 2021-06-29 ASSESSMENT — PAIN DESCRIPTION - DESCRIPTORS
DESCRIPTORS: ACHING;CRAMPING

## 2021-06-29 NOTE — CARE COORDINATION
Jose Luo CM attempted to meet hemant/ Nita at Bedside to discuss DCP after  on  @ 2143 at 37w0d of Male infant in [place]. All asleep.  Will round later today

## 2021-06-29 NOTE — CARE COORDINATION
Social Work     Sw reviewed medical record (current active problem list) and tox screens and found no concerns. Sw met with mom briefly to explain Sw role, inquire if any needs or concerns, and provide safe sleep education and discuss. Mom denied any needs or questions and informs baby has a safe sleep environment (crib). Mom denied any current s/s of anxiety or depression and is aware to reach out to OB if any s/s occur after dc. Mom reports a good support system and denied any current questions or needs. Mom reports she and fob have 2 other kids at home (girls- 9,2), and that ped will be Dr. Roge Montgomery. Sw encouraged mom to reach out if any issues or concerns arise.

## 2021-06-29 NOTE — PLAN OF CARE
Problem: Anxiety:  Goal: Level of anxiety will decrease  Description: Level of anxiety will decrease  6/29/2021 1953 by Mauro Smith RN  Outcome: Completed  6/29/2021 1856 by Danyel Colon RN  Outcome: Met This Shift  6/29/2021 0804 by Danyel Colon RN  Outcome: Ongoing     Problem: Breathing Pattern - Ineffective:  Goal: Able to breathe comfortably  Description: Able to breathe comfortably  6/29/2021 1953 by Mauro Smith RN  Outcome: Completed  6/29/2021 1856 by Danyel Colon RN  Outcome: Met This Shift  6/29/2021 0804 by Danyel Colon RN  Outcome: Ongoing     Problem: Fluid Volume - Imbalance:  Goal: Absence of imbalanced fluid volume signs and symptoms  Description: Absence of imbalanced fluid volume signs and symptoms  6/29/2021 1953 by Mauro Smith RN  Outcome: Completed  6/29/2021 1856 by Danyel Colon RN  Outcome: Met This Shift  6/29/2021 0804 by Danyel Colon RN  Outcome: Ongoing  Goal: Absence of intrapartum hemorrhage signs and symptoms  Description: Absence of intrapartum hemorrhage signs and symptoms  6/29/2021 1953 by Mauro Smith RN  Outcome: Completed  6/29/2021 1856 by Danyel Colon RN  Outcome: Met This Shift  6/29/2021 0804 by Danyel Colon RN  Outcome: Ongoing  Goal: Absence of postpartum hemorrhage signs and symptoms  Description: Absence of postpartum hemorrhage signs and symptoms  6/29/2021 1953 by Mauro Smith RN  Outcome: Completed  6/29/2021 1856 by Danyel Colon RN  Outcome: Met This Shift  6/29/2021 0804 by Danyel Colon RN  Outcome: Ongoing     Problem: Infection - Intrapartum Infection:  Goal: Will show no infection signs and symptoms  Description: Will show no infection signs and symptoms  6/29/2021 1953 by Mauro Smith RN  Outcome: Completed  6/29/2021 1856 by Danyel Colon RN  Outcome: Met This Shift  6/29/2021 0804 by Danyel Colon RN  Outcome: Ongoing     Problem: Labor Process - Prolonged:  Goal: Labor progression, first pattern  6/29/2021 1953 by Jack Dan RN  Outcome: Completed  6/29/2021 1856 by Cherry Yancey RN  Outcome: Met This Shift  6/29/2021 0804 by Cherry Yancey RN  Outcome: Ongoing     Problem: Urinary Retention:  Goal: Experiences of bladder distention will decrease  Description: Experiences of bladder distention will decrease  6/29/2021 1953 by Jack Dan RN  Outcome: Completed  6/29/2021 1856 by Cherry Yancey RN  Outcome: Met This Shift  6/29/2021 0804 by Cherry Yancey RN  Outcome: Ongoing  Goal: Urinary elimination within specified parameters  Description: Urinary elimination within specified parameters  6/29/2021 1953 by Jack Dan RN  Outcome: Completed  6/29/2021 1856 by Cherry Yancey RN  Outcome: Met This Shift  6/29/2021 0804 by Cherry Yancey RN  Outcome: Ongoing     Problem: Pain:  Goal: Pain level will decrease  Description: Pain level will decrease  6/29/2021 1953 by Jack Dan RN  Outcome: Ongoing  6/29/2021 1856 by Cherry Yancey RN  Outcome: Met This Shift  6/29/2021 0804 by Cherry Yancey RN  Outcome: Ongoing  Goal: Control of acute pain  Description: Control of acute pain  6/29/2021 1953 by Jack Dan RN  Outcome: Ongoing  6/29/2021 1856 by Cherry Yancey RN  Outcome: Met This Shift  6/29/2021 0804 by Cherry Yancey RN  Outcome: Ongoing  Goal: Control of chronic pain  Description: Control of chronic pain  6/29/2021 1953 by Jack Dan RN  Outcome: Ongoing  6/29/2021 1856 by Cherry Yancey RN  Outcome: Met This Shift  6/29/2021 0804 by Cherry Yancey RN  Outcome: Ongoing     Problem: Discharge Planning:  Goal: Discharged to appropriate level of care  Description: Discharged to appropriate level of care  6/29/2021 1953 by Jack Dan RN  Outcome: Ongoing  6/29/2021 1856 by Cherry Yancey RN  Outcome: Met This Shift  6/29/2021 0804 by Cherry Yancey RN  Outcome: Ongoing     Problem: Constipation:  Goal: Bowel elimination is within specified parameters  Description: Bowel elimination is within specified parameters  6/29/2021 1953 by Celio Campo RN  Outcome: Ongoing  6/29/2021 1856 by Romelia Adkins RN  Outcome: Met This Shift  6/29/2021 0804 by Romelia Adkins RN  Outcome: Ongoing     Problem: Infection - Risk of, Puerperal Infection:  Goal: Will show no infection signs and symptoms  Description: Will show no infection signs and symptoms  6/29/2021 1953 by Celio Campo RN  Outcome: Completed  6/29/2021 1856 by Romelia Adkins RN  Outcome: Met This Shift  6/29/2021 0804 by Romelia Adkins RN  Outcome: Ongoing     Problem: Mood - Altered:  Goal: Mood stable  Description: Mood stable  6/29/2021 1953 by Celio Campo RN  Outcome: Ongoing  6/29/2021 1856 by Romelia Adkins RN  Outcome: Met This Shift  6/29/2021 0804 by Romelia Adkins RN  Outcome: Ongoing

## 2021-06-29 NOTE — PROGRESS NOTES
POST PARTUM DAY # 1    Lennox Noa is a 32 y.o. female  This patient was seen & examined today. Her pregnancy was complicated by:   Patient Active Problem List   Diagnosis    H/O PTD (G2 @ 28w)    Short cervical length during pregnancy in second trimester    S/P Vaz Cervical cerclage placed 18, removed     Noncompliance    Poor historian    History of cervical cerclage, currently pregnant, second trimester    History of gestational hypertension    Bilateral lower extremity edema    Incompetent cervix    Vaz Cerclage placed 3/17/21    Hypertension affecting pregnancy in third trimester    Need for Tdap vaccination    Insulin controlled gestational diabetes mellitus (GDM) in third trimester    GBS (group B Streptococcus carrier), +RV culture, currently pregnant     21 M Apg 8/ Wt 8#14       Today she is doing well without any chief complaint. Her lochia is light. She denies chest pain, shortness of breath, headache, lightheadedness and blurred vision. She is not breast feeding and she denies any breast tenderness. She is ambulating well. Her voiding pattern is normal. I reviewed signs and symptoms of post partum depression with the patient, she currently denies any of these symptoms. She is tolerating solids.      Vital Signs:  Vitals:    21 0015   BP: 130/81   Pulse: 91   Resp: 16   Temp: 98.3 °F (36.8 °C)   SpO2:          Physical Exam:  General:  no apparent distress, alert and cooperative  Neurologic:  alert, oriented, normal speech, no focal findings or movement disorder noted  Lungs:  No increased work of breathing, good air exchange, clear to auscultation bilaterally, no crackles or wheezing  Heart:  Normal apical impulse, regular rate and rhythm, normal S1 and S2, no S3 or S4, and no murmur noted and regular rate and rhythm    Abdomen: abdomen soft, non-distended, non-tender  Fundus: non-tender, firm, below umbilicus  Extremities:  no calf tenderness, non edematous    Lab:  Lab Results   Component Value Date    HGB 11.7 (L) 2021     Lab Results   Component Value Date    HCT 35.9 (L) 2021       Assessment/Plan:  1. Jojo Baumann is a [id number] PPD # 1 s/p    - Doing well, VSS, elevated pressures, no severe ranges   - male infant in [address], circumcision desired   - Encourage ambulation   - Tylenol/Ibuprofen for pain control   - Postpartum Hgb/Hct ordered for this AM   - Continue post partum care  2. Rh positive/Rubella immune  3. Bottle feeding    - Patient denies sxs of mastitis  4. gHTN   - Elevated blood pressures, no severe ranges   - PreE labs wnl, P/C 0.14   - Patient on labetalol 100mg BID   - Patient denies any s/s PreE   - Continue to closely monitor  5. GDMA2   - NPO @ [place]   - Novolog 0/6, NPH 15U qhs - held      - FBS ordered AM  6. Noncompliance  - Encouraged close outpatient follow up  7. BMI 37.4    Counseling Completed:  Secondary Smoke risks and Sudden Infant Death Syndrome were reviewed with recommendations. Infant sleeping, \"back to sleep\" and avoidance of co-sleeping recommendations were reviewed. Signs and Symptoms of Post Partum Depression were reviewed. The patient is to call if any occur. Signs and symptoms of Mastitis were reviewed. The patient is to call if any occur for follow up.   Discharge instructions including pelvic rest, no driving with pain medicine and office follow-up were reviewed with patient     Attending Physician: Dr. Espinoza Camarillo, DO  Ob/Gyn Resident   2021, 2:54 AM

## 2021-06-29 NOTE — PLAN OF CARE
Problem: Anxiety:  Goal: Level of anxiety will decrease  Description: Level of anxiety will decrease  6/29/2021 1856 by Vj Jay RN  Outcome: Met This Shift  6/29/2021 0804 by Vj Jay RN  Outcome: Ongoing     Problem: Breathing Pattern - Ineffective:  Goal: Able to breathe comfortably  Description: Able to breathe comfortably  6/29/2021 1856 by Vj Jay RN  Outcome: Met This Shift  6/29/2021 0804 by Vj Jay RN  Outcome: Ongoing     Problem: Fluid Volume - Imbalance:  Goal: Absence of imbalanced fluid volume signs and symptoms  Description: Absence of imbalanced fluid volume signs and symptoms  6/29/2021 1856 by Vj Jay RN  Outcome: Met This Shift  6/29/2021 0804 by Vj Jay RN  Outcome: Ongoing  Goal: Absence of intrapartum hemorrhage signs and symptoms  Description: Absence of intrapartum hemorrhage signs and symptoms  6/29/2021 1856 by Vj Jay RN  Outcome: Met This Shift  6/29/2021 0804 by Vj Jay RN  Outcome: Ongoing  Goal: Absence of postpartum hemorrhage signs and symptoms  Description: Absence of postpartum hemorrhage signs and symptoms  6/29/2021 1856 by Vj Jay RN  Outcome: Met This Shift  6/29/2021 0804 by Vj Jay RN  Outcome: Ongoing     Problem: Infection - Intrapartum Infection:  Goal: Will show no infection signs and symptoms  Description: Will show no infection signs and symptoms  6/29/2021 1856 by Vj Jay RN  Outcome: Met This Shift  6/29/2021 0804 by Vj Jay RN  Outcome: Ongoing     Problem: Labor Process - Prolonged:  Goal: Labor progression, first stage, within specified pattern  Description: Labor progression, first stage, within specified pattern  6/29/2021 1856 by Vj Jay RN  Outcome: Met This Shift  6/29/2021 0804 by Vj Jay RN  Outcome: Ongoing  Goal: Labor progession, second stage, within specified pattern  Description: Labor progession, second stage, within specified pattern  6/29/2021 1856 by Inessa Mesa RN  Outcome: Met This Shift  2021 0804 by Inessa Mesa RN  Outcome: Ongoing  Goal: Uterine contractions within specified parameters  Description: Uterine contractions within specified parameters  2021 185 by Inessa Mesa RN  Outcome: Met This Shift  2021 0804 by Inessa Mesa RN  Outcome: Ongoing     Problem:  Screening:  Goal: Ability to make informed decisions regarding treatment has improved  Description: Ability to make informed decisions regarding treatment has improved  2021 185 by Inessa Mesa RN  Outcome: Met This Shift  2021 0804 by Inessa Mesa RN  Outcome: Ongoing     Problem: Pain - Acute:  Goal: Pain level will decrease  Description: Pain level will decrease  2021 by Inessa Mesa RN  Outcome: Met This Shift  2021 0804 by Inessa Mesa RN  Outcome: Ongoing  Goal: Able to cope with pain  Description: Able to cope with pain  2021 185 by Inessa Mesa RN  Outcome: Met This Shift  2021 0804 by Inessa Mesa RN  Outcome: Ongoing     Problem: Tissue Perfusion - Uteroplacental, Altered:  Goal: Absence of abnormal fetal heart rate pattern  Description: Absence of abnormal fetal heart rate pattern  2021 185 by Inessa Mesa RN  Outcome: Met This Shift  2021 0804 by Inessa Mesa RN  Outcome: Ongoing     Problem: Urinary Retention:  Goal: Experiences of bladder distention will decrease  Description: Experiences of bladder distention will decrease  2021 185 by Inessa Mesa RN  Outcome: Met This Shift  2021 0804 by Inessa Mesa RN  Outcome: Ongoing  Goal: Urinary elimination within specified parameters  Description: Urinary elimination within specified parameters  2021 185 by Inessa Mesa RN  Outcome: Met This Shift  2021 0804 by Inessa Mesa RN  Outcome: Ongoing     Problem: Pain:  Goal: Pain level will decrease  Description: Pain level will decrease  2021 by Inessa Mesa RN  Outcome: Met This Shift  6/29/2021 0804 by Christen Ortiz RN  Outcome: Ongoing  Goal: Control of acute pain  Description: Control of acute pain  6/29/2021 1856 by Christen Ortiz RN  Outcome: Met This Shift  6/29/2021 0804 by Christen Ortiz RN  Outcome: Ongoing  Goal: Control of chronic pain  Description: Control of chronic pain  6/29/2021 1856 by Christen Ortiz RN  Outcome: Met This Shift  6/29/2021 0804 by Christen Ortiz RN  Outcome: Ongoing     Problem: Discharge Planning:  Goal: Discharged to appropriate level of care  Description: Discharged to appropriate level of care  6/29/2021 1856 by Christen Ortiz RN  Outcome: Met This Shift  6/29/2021 0804 by Christen Ortiz RN  Outcome: Ongoing     Problem: Constipation:  Goal: Bowel elimination is within specified parameters  Description: Bowel elimination is within specified parameters  6/29/2021 1856 by Christen Ortiz RN  Outcome: Met This Shift  6/29/2021 0804 by Christen Ortiz RN  Outcome: Ongoing     Problem: Infection - Risk of, Puerperal Infection:  Goal: Will show no infection signs and symptoms  Description: Will show no infection signs and symptoms  6/29/2021 1856 by Christen Ortiz RN  Outcome: Met This Shift  6/29/2021 0804 by Christen Ortiz RN  Outcome: Ongoing     Problem: Mood - Altered:  Goal: Mood stable  Description: Mood stable  6/29/2021 1856 by Christen Ortiz RN  Outcome: Met This Shift  6/29/2021 0804 by Christen Ortiz RN  Outcome: Ongoing

## 2021-06-29 NOTE — L&D DELIVERY NOTE
Mother's Information    Labor Events     labor?: No  Rupture type: Artificial=AROM  Fluid color: Clear  Fluid odor: None     Mother Delivery Information    Episiotomy: None  Lacerations: None  # of Repair Packets: 1  Surgical or Additional Est. Blood Loss (mL): 0 (View Only): Edit in Flowsheets   Combined Est. Blood Loss (mL): 0        Ramandeep Martinez [[id number]]    Labor Events     labor?: No   steroids?: None  Cervical ripening date/time:     Antibiotics received during labor?: Yes  Rupture date/time: 21 13:07:00   Rupture type: Artificial=AROM  Fluid color: Clear  Fluid odor: None  Induction: Oxytocin  Indications for induction: Hypertension, Diabetes  Augmentation: Oxytocin  Indications for augmentation: Ineffective Contraction Pattern  Labor complications: None          Labor Event Times    Labor onset date/time: 21 1648   Dilation complete date/time:      Start pushing:    Decision time (emergent ):        Anesthesia    Method: Epidural     Assisted Delivery Details    Forceps attempted?: No  Vacuum extractor attempted?: No     Document Additional Attempt       Document Additional Attempt             Shoulder Dystocia    Shoulder dystocia present?: No  Add Second Maneuver  Add Third Maneuver  Add Fourth Maneuver  Add Fifth Maneuver  Add Sixth Maneuver  Add Seventh Maneuver  Add Eighth Maneuver  Add Ninth Maneuver     [place] Presentation    Presentation: Vertex  Position: Right  _: Occiput  _: Anterior     [place] Information    Head delivery date/time: 2021 21:43:00   Changing the 's delivery date/time could affect patient care.:    Delivery date/time:  213   Delivery type: Vaginal, Spontaneous    Details:        Delivery Providers    Delivering clinician: Sharron Zarate DO   Provider Role    GLENROY Villar, DO       Cord    Vessels: 3 Vessels  Complications: None  Delayed cord clamping?: Priority: High     Overview Note:     Knot at 12 o'clock      37 weeks gestation of pregnancy 2021    Encounter for induction of labor 2021    Need for Tdap vaccination 2021    Vaz Cerclage placed 3/17/21 2021     Overview Note:     #5 Ethibond suture, knot @ 12      Incompetent cervix     Bilateral lower extremity edema 03/10/2021    History of cervical cerclage, currently pregnant, second trimester 2020    History of gestational hypertension 2020    Noncompliance 2018    Poor historian 2018    Encounter for supervision of high risk pregnancy in third trimester, antepartum 2018    Short cervical length during pregnancy in second trimester 2018     Overview Note:     16mm CL found 18 in office  17 mm CL found 18 in office.   Patient currently taking Vilma Leitz H/O PTD (G2 @ 28w) 05/15/2018             Post-operative Diagnosis:    Living  infant(s) and Male  Same as Pre-Op  Cervical laceration/ defect at 5 oclock    Anesthesia:  epidural anesthesia    EBL: see QBL       Findings Summary:  Delivery Summary:  Mother's Information    Labor Events     labor?: No  Rupture type: Artificial=AROM  Fluid color: Clear  Fluid odor: None     Mother Delivery Information    Episiotomy: None  Lacerations: None  # of Repair Packets: 1  Surgical or Additional Est. Blood Loss (mL): 0 (View Only): Edit in Flowsheets   Combined Est. Blood Loss (mL): 0        Ubaldopedro, Ramandeep Galeana Nita [[id number]]    Labor Events     labor?: No   steroids?: None  Cervical ripening date/time:     Antibiotics received during labor?: Yes  Rupture date/time: 21 13:07:00   Rupture type: Artificial=AROM  Fluid color: Clear  Fluid odor: None  Induction: Oxytocin  Indications for induction: Hypertension, Diabetes  Augmentation: Oxytocin  Indications for augmentation: Ineffective Contraction Pattern  Labor complications: None          Labor (View Only): Edit in Flowsheets   Combined est. blood loss (mL): 0     Other Procedures    Procedures: Cerclage Removal            Living  infant(s) and Male    Cephalic  right occiput anterior  Other:       Amniotic Fluid was: Clear  A Nuchal Cord: was not present x 0  A Spontaneous Cry Was Noted: Yes  The Baby: was suctioned        The Placenta Was Removed:  intact, whole and that the umbilical cord had three vessels noted    cord gasses were obtained and sent to the lab, cord blood was obtained and sent to the lab and Pitocin, 20 milliunits in 1 liter of ringers lactate was administered, wide open, to assist with uterine contraction    The umbilical cord had delayed clamping of 1 minute: Yes      Episiotomy: (none)  Cervical laceration at 9 oclock appeared d/t cerclage hemostatic. Re-approximated utilizing 2.0 vicryl with excellent approximation and hemostasis. Suture used for repair:  Vicryl 2.0. Remainder of cerclage identified at 1 oclock and removed without difficulty    The Cervix Vagina & Rectum were inspected after the repair and found to be intact without any defects. Good sphincter tone was present. Yes      Condition:  infant stable to general nursery and mother stable    Blood Type and Rh:   ABO/Rh   Date Value Ref Range Status   2021 A POSITIVE  Final         Rubella Immunity Status:     Rubella Antibody, IGG   Date Value Ref Range Status   2020 69.7 IU/mL Final     Comment:                 REFERENCE RANGE:  <5.0       NON-REACTIVE (non-immune)  5.0 TO 9.9 EQUIVOCAL  >=10.0     REACTIVE     (immune)                     Infant Feeding:    bottle    Circumcision Requested: Yes      Attending Attestation: I was present and scrubbed for the entire procedure.       A Vaginal Vault Sweep Was Completed-All Sponge Counts Were Correct: Yes          Resident Name: Susi Lee D.O. PGY1    Attending Name: Tre Whiting DO      Electronically signed by Tre Whiting DO on 6/28/2021 at 10:04 PM

## 2021-06-29 NOTE — CARE COORDINATION
POST-PARTUM/WIN TRANSITIONAL CARE PLAN    37 weeks gestation of pregnancy [Z3A.37]    Writer met w/ Riky Uribe and Cheng at bedside to discuss DCP. Riky Uribe is S/P  on 2021 @ 2143 at 37w0d of Male infant    Infant name on BC: [address]. Infant to [facility]. Infant PCP Dr. Devyn Jackson. FOB: Paul Craig phone [phone] verified name/address/phone number correct on [employer] correct. Writer notified Riky Uribe and Delwyn Hatchet they have 30 days from date of birth to add [place] to insurance policy. They verbalized understanding. No previous home care or dme. Anticipate DC of couplet 2021    CM continue to follow for any DC needs.

## 2021-06-29 NOTE — PROGRESS NOTES
POST PARTUM DAY # 1     Karen Yuen is a 32 y.o. female  This patient was seen & examined today. Patient doing well, minimal lochia, no clots, voiding, ambulating, tolerating regular diet, no CP/SOB/HA    VS:   BP: 125/79  HR: 84  Temp: 98.4  Resp: 16         Physical Exam:  General:  no apparent distress, alert and cooperative  Abdomen: abdomen soft, non-distended, non-tender  Fundus: non-tender, firm, below umbilicus  Extremities:  no calf tenderness, non edematous      Assessment/Plan:  1.  Karen Yuen is a [id number] PPD # 1 s/p               - Doing well, VSS, elevated pressures, no severe ranges              - male infant - not cleared for circumcision by pediatrician   - uncontrolled DM and noncompliant   - routine postpartum care   - anticipate D/C home tomorrow

## 2021-06-29 NOTE — FLOWSHEET NOTE
Dr Disha Hodges and Dr Maria Ines Barros at bedside to push with patient Fetal Heart  Tone 135 Accelerations present.  Contractions moderate upon palpation

## 2021-06-29 NOTE — PLAN OF CARE
Problem: Anxiety:  Goal: Level of anxiety will decrease  Description: Level of anxiety will decrease  Outcome: Ongoing     Problem: Breathing Pattern - Ineffective:  Goal: Able to breathe comfortably  Description: Able to breathe comfortably  Outcome: Ongoing     Problem: Fluid Volume - Imbalance:  Goal: Absence of imbalanced fluid volume signs and symptoms  Description: Absence of imbalanced fluid volume signs and symptoms  Outcome: Ongoing  Goal: Absence of intrapartum hemorrhage signs and symptoms  Description: Absence of intrapartum hemorrhage signs and symptoms  Outcome: Ongoing  Goal: Absence of postpartum hemorrhage signs and symptoms  Description: Absence of postpartum hemorrhage signs and symptoms  Outcome: Ongoing     Problem: Infection - Intrapartum Infection:  Goal: Will show no infection signs and symptoms  Description: Will show no infection signs and symptoms  Outcome: Ongoing     Problem: Labor Process - Prolonged:  Goal: Labor progression, first stage, within specified pattern  Description: Labor progression, first stage, within specified pattern  Outcome: Ongoing  Goal: Labor progession, second stage, within specified pattern  Description: Labor progession, second stage, within specified pattern  Outcome: Ongoing  Goal: Uterine contractions within specified parameters  Description: Uterine contractions within specified parameters  Outcome: Ongoing     Problem:  Screening:  Goal: Ability to make informed decisions regarding treatment has improved  Description: Ability to make informed decisions regarding treatment has improved  Outcome: Ongoing     Problem: Pain - Acute:  Goal: Pain level will decrease  Description: Pain level will decrease  Outcome: Ongoing  Goal: Able to cope with pain  Description: Able to cope with pain  Outcome: Ongoing     Problem: Tissue Perfusion - Uteroplacental, Altered:  Goal: Absence of abnormal fetal heart rate pattern  Description: Absence of abnormal fetal heart rate pattern  Outcome: Ongoing     Problem: Urinary Retention:  Goal: Experiences of bladder distention will decrease  Description: Experiences of bladder distention will decrease  Outcome: Ongoing  Goal: Urinary elimination within specified parameters  Description: Urinary elimination within specified parameters  Outcome: Ongoing     Problem: Pain:  Goal: Pain level will decrease  Description: Pain level will decrease  Outcome: Ongoing  Goal: Control of acute pain  Description: Control of acute pain  Outcome: Ongoing  Goal: Control of chronic pain  Description: Control of chronic pain  Outcome: Ongoing     Problem: Discharge Planning:  Goal: Discharged to appropriate level of care  Description: Discharged to appropriate level of care  Outcome: Ongoing     Problem: Constipation:  Goal: Bowel elimination is within specified parameters  Description: Bowel elimination is within specified parameters  Outcome: Ongoing     Problem: Infection - Risk of, Puerperal Infection:  Goal: Will show no infection signs and symptoms  Description: Will show no infection signs and symptoms  Outcome: Ongoing     Problem: Mood - Altered:  Goal: Mood stable  Description: Mood stable  Outcome: Ongoing

## 2021-06-30 VITALS
HEART RATE: 89 BPM | SYSTOLIC BLOOD PRESSURE: 129 MMHG | OXYGEN SATURATION: 96 % | DIASTOLIC BLOOD PRESSURE: 79 MMHG | BODY MASS INDEX: 37.22 KG/M2 | RESPIRATION RATE: 20 BRPM | TEMPERATURE: 98.4 F | WEIGHT: 218 LBS | HEIGHT: 64 IN

## 2021-06-30 LAB
GLUCOSE BLD-MCNC: 80 MG/DL (ref 65–105)
SURGICAL PATHOLOGY REPORT: NORMAL

## 2021-06-30 PROCEDURE — 6370000000 HC RX 637 (ALT 250 FOR IP): Performed by: STUDENT IN AN ORGANIZED HEALTH CARE EDUCATION/TRAINING PROGRAM

## 2021-06-30 PROCEDURE — 82947 ASSAY GLUCOSE BLOOD QUANT: CPT

## 2021-06-30 RX ADMIN — DOCUSATE SODIUM 100 MG: 100 CAPSULE, LIQUID FILLED ORAL at 09:04

## 2021-06-30 RX ADMIN — LABETALOL HCL 100 MG: 100 TABLET, FILM COATED ORAL at 09:04

## 2021-06-30 RX ADMIN — IBUPROFEN 600 MG: 600 TABLET, FILM COATED ORAL at 15:13

## 2021-06-30 RX ADMIN — ACETAMINOPHEN 1000 MG: 500 TABLET ORAL at 11:04

## 2021-06-30 RX ADMIN — IBUPROFEN 600 MG: 600 TABLET, FILM COATED ORAL at 09:04

## 2021-06-30 RX ADMIN — IBUPROFEN 600 MG: 600 TABLET, FILM COATED ORAL at 03:10

## 2021-06-30 ASSESSMENT — PAIN SCALES - GENERAL
PAINLEVEL_OUTOF10: 5
PAINLEVEL_OUTOF10: 4
PAINLEVEL_OUTOF10: 2

## 2021-06-30 NOTE — ANESTHESIA POSTPROCEDURE EVALUATION
Department of Anesthesiology  Postprocedure Note    Patient: Yehuda Swartz  MRN: [id number]  YOB: 1990  Date of evaluation: 6/30/2021  Time:  6:42 AM     Procedure Summary     Date: 06/28/21 Room / Location:     Anesthesia Start: 3401 Anesthesia Stop: 2143    Procedure: Labor Analgesia Diagnosis:     Scheduled Providers:  Responsible Provider: Jorgito Guillory MD    Anesthesia Type: epidural ASA Status: 2          Anesthesia Type: epidural    Del Phase I:      Del Phase II:      Last vitals: Reviewed and per EMR flowsheets.        Anesthesia Post Evaluation    Patient location during evaluation: floor  Patient participation: complete - patient participated  Level of consciousness: awake and alert  Pain score: 2  Airway patency: patent  Nausea & Vomiting: no vomiting and no nausea  Complications: no  Cardiovascular status: hemodynamically stable  Respiratory status: acceptable  Hydration status: stable

## 2021-06-30 NOTE — PROGRESS NOTES
POST PARTUM DAY # 2    Annie Marin is a 32 y.o. female  This patient was seen & examined today. Her pregnancy was complicated by:   Patient Active Problem List   Diagnosis    H/O PTD (G2 @ 28w)    Short cervical length during pregnancy in second trimester    S/P Vaz Cervical cerclage placed 18, removed     Noncompliance    Poor historian    History of cervical cerclage, currently pregnant, second trimester    History of gestational hypertension    Bilateral lower extremity edema    Incompetent cervix    Vaz Cerclage placed 3/17/21    Hypertension affecting pregnancy in third trimester    Need for Tdap vaccination    Insulin controlled gestational diabetes mellitus (GDM) in third trimester    GBS (group B Streptococcus carrier), +RV culture, currently pregnant     21 M Apg 8/9 Wt 8#14       Today she is doing well without any chief complaint. Her lochia is light. She denies chest pain, shortness of breath, headache, lightheadedness, blurred vision and peripheral edema. She is  bottle feeding and she denies any breast tenderness. She is ambulating well. Her voiding pattern is normal. I reviewed signs and symptoms of post partum depression with the patient, she currently denies any of these symptoms. She is tolerating solids.      Vital Signs:  Vitals:    21 1200 21 1600 21 0310   BP: 132/79 (!) 131/90 (!) 150/86 132/79   Pulse: 87 83 87 89   Resp:    Temp:   97.3 °F (36.3 °C) 98 °F (36.7 °C)   TempSrc:       SpO2:       Weight:       Height:             Physical Exam:  General:  no apparent distress, alert and cooperative  Neurologic:  alert, oriented, normal speech, no focal findings or movement disorder noted  Lungs:  No increased work of breathing, good air exchange, clear to auscultation bilaterally, no crackles or wheezing  Heart:  regular rate and rhythm    Abdomen: abdomen soft, non-distended, non-tender  Fundus: non-tender, firm, below umbilicus  Extremities:  no calf tenderness, non edematous    Lab:  Lab Results   Component Value Date    HGB 9.3 (L) 2021     Lab Results   Component Value Date    HCT 30.2 (L) 2021       Assessment/Plan:  1. Greer Bonilla is a [id number] PPD # 2 s/p    - Doing well, VSS, some elevated pressures, no severe ranges   - male infant in [address], circumcision desired              - Encourage ambulation              - Tylenol/Ibuprofen for pain control              - Continue post partum care  2. Rh positive/Rubella immune  3. Bottle feeding         - Patient denies sxs of mastitis  4. gHTN        - Elevated blood pressures, no severe ranges        - PreE labs wnl, P/C 0.14        - Patient on labetalol 100mg BID        - Patient denies any s/s PreE        - Continue to closely monitor  5. Anemia  - Hgb 11.7>9.3  - Clinically asymptomatic  - PO Iron on DC  6. GDMA2        - NPO @ [place]        - Novolog 0, NPH 15U qhs - held           - FBS ordered AM  6. Noncompliance  - Encouraged close outpatient follow up  7. BMI 37.4     Counseling Completed:  Secondary Smoke risks and Sudden Infant Death Syndrome were reviewed with recommendations. Infant sleeping, \"back to sleep\" and avoidance of co-sleeping recommendations were reviewed. Signs and Symptoms of Post Partum Depression were reviewed. The patient is to call if any occur. Signs and symptoms of Mastitis were reviewed. The patient is to call if any occur for follow up.   Discharge instructions including pelvic rest, no driving with pain medicine and office follow-up were reviewed with patient     Attending Physician: Dr. Phil Lemos DO  Ob/Gyn Resident   2021, 4:12 AM

## 2021-07-12 ENCOUNTER — POSTPARTUM VISIT (OUTPATIENT)
Dept: OBGYN CLINIC | Age: 31
End: 2021-07-12

## 2021-07-12 VITALS
WEIGHT: 197 LBS | SYSTOLIC BLOOD PRESSURE: 131 MMHG | BODY MASS INDEX: 33.81 KG/M2 | DIASTOLIC BLOOD PRESSURE: 86 MMHG | HEART RATE: 98 BPM

## 2021-07-12 DIAGNOSIS — Z87.42 HISTORY OF DYSMENORRHEA: ICD-10-CM

## 2021-07-12 DIAGNOSIS — Z30.09 BIRTH CONTROL COUNSELING: ICD-10-CM

## 2021-07-12 PROCEDURE — 99024 POSTOP FOLLOW-UP VISIT: CPT | Performed by: CLINICAL NURSE SPECIALIST

## 2021-07-12 NOTE — PROGRESS NOTES
HPI  Raul Vegas is a 32 y.o. female  presents for her 2 week postpartum vagial delivery visit. Patient reports that she is having regular bowel movements, and is urinating without difficulty. Patient states that her bleeding is light to moderate. Patient is bottle feeding. Patient denies any mastitis. Patient denies any postpartum depression/baby blues, no suicidal, or homicidal thoughts, and has good support system and her depression score is a 6 at this time. Patient denies any pain at this time. Patient states that she has experienced some anxiety with a new schedule and managing 3 children but does not feel that she needs any medication at this time. [id number]    OB History    Para Term  AB Living   4 3 2 1 1 3   SAB TAB Ectopic Molar Multiple Live Births   1       0 3      # Outcome Date GA Lbr Chmea/2nd Weight Sex Delivery Anes PTL Lv   4 Term 21 37w0d 04:45 / 00:10 8 lb 14.2 oz (4.03 kg) M Vag-Spont EPI N CASTILLO   3 Term 18 38w3d 00:48 / 00:43 7 lb 6.9 oz (3.37 kg) F Vag-Spont EPI N CASTILLO   2  10/18/11 28w0d  3 lb 9 oz (1.616 kg) F Vag-Spont EPI Y CASTILLO      Complications: Placenta previa, Placental abruption in third trimester   1 SAB  13w0d             Birth Comments: had d&c       Blood pressure 131/86, pulse 98, weight 197 lb (89.4 kg), last menstrual period 10/15/2020, unknown if currently breastfeeding.       Patient Active Problem List    Diagnosis Date Noted    GBS (group B Streptococcus carrier), +RV culture, currently pregnant 2021     Priority: High     2021 treat in labor per protocol       21 M Apg 8/9 Wt 8#14 2021    Insulin controlled gestational diabetes mellitus (GDM) in third trimester 2021    Need for Tdap vaccination 2021    Hypertension affecting pregnancy in third trimester 2021     GESTATIONAL HTN      Vaz Cerclage placed 3/17/21 2021     #5 Ethibond suture, knot @ 12      Incompetent cervix     Bilateral lower extremity edema 03/10/2021    History of cervical cerclage, currently pregnant, second trimester 12/30/2020    History of gestational hypertension 12/30/2020    S/P Vaz Cervical cerclage placed 6/1/18, removed 9/13 08/13/2018     Knot at 12 o'clock      Noncompliance 08/13/2018    Poor historian 08/13/2018    Short cervical length during pregnancy in second trimester 05/31/2018     16mm CL found 8/23/18 in office  17 mm CL found 5/31/18 in office. Patient currently taking Rodolph Landsberg H/O PTD (G2 @ 28w) 05/15/2018        Diagnosis Orders   1. Postpartum care following vaginal delivery     2. Birth control counseling     3. History of dysmenorrhea         Vitals:    07/12/21 0945   BP: 131/86   Pulse: 98            Review of Systems   Constitutional: Negative for chills and fever. HENT: Negative. Eyes: Negative. Respiratory: Negative. Cardiovascular: Negative. Gastrointestinal: Negative. Endocrine: Negative. Genitourinary: Positive for vaginal bleeding (light to moderate). Negative for dysuria and menstrual problem. Musculoskeletal: Negative. Skin: Negative. Allergic/Immunologic: Negative. Neurological: Negative. Hematological: Negative. Psychiatric/Behavioral: Negative. Physical Exam  Vitals reviewed. Constitutional:       Appearance: She is well-developed. HENT:      Head: Normocephalic and atraumatic. Eyes:      Conjunctiva/sclera: Conjunctivae normal.   Cardiovascular:      Rate and Rhythm: Normal rate and regular rhythm. Pulmonary:      Effort: Pulmonary effort is normal.      Breath sounds: Normal breath sounds. Abdominal:      General: Bowel sounds are normal.   Musculoskeletal:         General: Normal range of motion. Cervical back: Normal range of motion and neck supple. Skin:     General: Skin is warm and dry. Neurological:      Mental Status: She is oriented to person, place, and time. Psychiatric:         Behavior: Behavior normal.         Thought Content: Thought content normal.         Judgment: Judgment normal.          Assessment       Diagnosis Orders   1. Postpartum care following vaginal delivery     2. Birth control counseling     3. History of dysmenorrhea           Plan      Follow up in 3 wks for PP visit. Discussed with patient birth control methods and patient would like to do depo for her history of dysmenorrhea.     Electronically signed by: Eleazar Ennis CNP

## 2021-07-12 NOTE — PROGRESS NOTES
Postpartum Visit: Patient is here today for postpartum vaginal delivery. I have fully reviewed the prenatal and intrapartum course. She is 2 weeks postpartum. Patient is bottle feeding. Her bleeding is moderate. Patient's pain is 1 out of 10 on the pain scale. Patient is not sexually active. Current contraception method is abstinence. Postpartum depression screening questionnaire provided to patient and is negative.

## 2021-08-02 ENCOUNTER — POSTPARTUM VISIT (OUTPATIENT)
Dept: OBGYN CLINIC | Age: 31
End: 2021-08-02
Payer: COMMERCIAL

## 2021-08-02 VITALS
SYSTOLIC BLOOD PRESSURE: 127 MMHG | HEART RATE: 97 BPM | WEIGHT: 195 LBS | DIASTOLIC BLOOD PRESSURE: 88 MMHG | BODY MASS INDEX: 33.47 KG/M2

## 2021-08-02 DIAGNOSIS — N92.6 IRREGULAR MENSES: ICD-10-CM

## 2021-08-02 DIAGNOSIS — Z32.02 NEGATIVE PREGNANCY TEST: ICD-10-CM

## 2021-08-02 DIAGNOSIS — Z30.09 BIRTH CONTROL COUNSELING: ICD-10-CM

## 2021-08-02 DIAGNOSIS — Z78.9 USES HORMONAL CONTRACEPTIVE PATCH AS PRIMARY BIRTH CONTROL METHOD: ICD-10-CM

## 2021-08-02 PROCEDURE — 99213 OFFICE O/P EST LOW 20 MIN: CPT | Performed by: CLINICAL NURSE SPECIALIST

## 2021-08-02 ASSESSMENT — ENCOUNTER SYMPTOMS
RESPIRATORY NEGATIVE: 1
ALLERGIC/IMMUNOLOGIC NEGATIVE: 1
GASTROINTESTINAL NEGATIVE: 1
EYES NEGATIVE: 1

## 2021-08-02 NOTE — PROGRESS NOTES
STEPHENIE Goldsmith is a 32 y.o. female  presents for her 6 week postpartum vaginal delivery visit. Patient reports that she is having regular bowel movements, and is urinating without difficulty. Patient states that her bleeding is scant . Patient is bottle feeding. Patient denies any mastitis. Patient denies any postpartum depression/baby blues, no suicidal, or homicidal thoughts, and has good support system. Patient denies any pain at this time. Patient reports that she in the past her menses have been irregular and would like to go on birth control to regulate. [id number]    OB History    Para Term  AB Living   4 3 2 1 1 3   SAB TAB Ectopic Molar Multiple Live Births   1       0 3      # Outcome Date GA Lbr Chema/2nd Weight Sex Delivery Anes PTL Lv   4 Term 21 37w0d 04:45 / 00:10 8 lb 14.2 oz (4.03 kg) M Vag-Spont EPI N CASTILLO   3 Term 18 38w3d 00:48 / 00:43 7 lb 6.9 oz (3.37 kg) F Vag-Spont EPI N CASTILLO   2  10/18/11 28w0d  3 lb 9 oz (1.616 kg) F Vag-Spont EPI Y CASTILLO      Complications: Placenta previa, Placental abruption in third trimester   1 SAB  13w0d             Birth Comments: had d&c       Blood pressure 127/88, pulse 97, weight 195 lb (88.5 kg), last menstrual period 10/15/2020, unknown if currently breastfeeding.       Patient Active Problem List    Diagnosis Date Noted    GBS (group B Streptococcus carrier), +RV culture, currently pregnant 2021     Priority: High     2021 treat in labor per protocol       21 M Apg 8/9 Wt 8#14 2021    Insulin controlled gestational diabetes mellitus (GDM) in third trimester 2021    Need for Tdap vaccination 2021    Hypertension affecting pregnancy in third trimester 2021     GESTATIONAL HTN      Vaz Cerclage placed 3/17/21 2021     #5 Ethibond suture, knot @ 12      Incompetent cervix     Bilateral lower extremity edema 03/10/2021    History of cervical cerclage, currently pregnant, second trimester 12/30/2020    History of gestational hypertension 12/30/2020    S/P Vaz Cervical cerclage placed 6/1/18, removed 9/13 08/13/2018     Knot at 12 o'clock      Noncompliance 08/13/2018    Poor historian 08/13/2018    Short cervical length during pregnancy in second trimester 05/31/2018     16mm CL found 8/23/18 in office  17 mm CL found 5/31/18 in office. Patient currently taking Kat Guess H/O PTD (G2 @ 28w) 05/15/2018        Diagnosis Orders   1. Postpartum care following vaginal delivery     2. Birth control counseling     3. Uses hormonal contraceptive patch as primary birth control method     4. Negative pregnancy test         Vitals:    08/02/21 0933   BP: 127/88   Pulse: 97            Review of Systems   Constitutional: Negative for chills and fever. HENT: Negative. Eyes: Negative. Respiratory: Negative. Cardiovascular: Negative. Gastrointestinal: Negative. Endocrine: Negative. Genitourinary: Positive for vaginal bleeding (scant old blood). Negative for dysuria and menstrual problem. Musculoskeletal: Negative. Skin: Negative. Allergic/Immunologic: Negative. Neurological: Negative. Hematological: Negative. Psychiatric/Behavioral: Negative. Physical Exam  Vitals reviewed. Constitutional:       Appearance: She is well-developed. HENT:      Head: Normocephalic and atraumatic. Eyes:      Conjunctiva/sclera: Conjunctivae normal.   Cardiovascular:      Rate and Rhythm: Normal rate and regular rhythm. Pulmonary:      Effort: Pulmonary effort is normal.      Breath sounds: Normal breath sounds. Abdominal:      General: Bowel sounds are normal.   Genitourinary:     Vagina: Vaginal discharge (scant old blood) present. Comments: Cervical laceration is not healed, sutures intact  Musculoskeletal:         General: Normal range of motion. Cervical back: Normal range of motion and neck supple.    Skin: General: Skin is warm and dry. Neurological:      Mental Status: She is oriented to person, place, and time. Psychiatric:         Behavior: Behavior normal.         Thought Content: Thought content normal.         Judgment: Judgment normal.        Assessment       Diagnosis Orders   1. Postpartum care following vaginal delivery     2. Birth control counseling     3. Uses hormonal contraceptive patch as primary birth control method     4. Negative pregnancy test         Plan    Discussed all forms of birth control and patient states that when she was on depo she had depression, cant remember to take the pill and with nexplanon she bleed for an entire year. Discussed with patient birth control patch risks, side effects and use and patient verbalized understanding.   xulane samples given 6 boxes Lot: [id number] Exp: 10/2022    Follow up in 2 wks with Dr. Tiffanie Rangel for cervical laceration check    Electronically signed by: Carlene Argueta CNP

## 2021-08-02 NOTE — PROGRESS NOTES
Postpartum Visit: Patient is here today for postpartum vaginal delivery. I have fully reviewed the prenatal and intrapartum course. She is 5 weeks postpartum. Patient is bottle feeding. Her bleeding is spotting. Patient's pain is 0 out of 10 on the pain scale. Patient is not sexually active. Current contraception method is abstinence. Postpartum depression screening questionnaire provided to patient and is negative.

## 2021-08-03 ENCOUNTER — TELEPHONE (OUTPATIENT)
Dept: OBGYN CLINIC | Age: 31
End: 2021-08-03

## 2021-08-25 ENCOUNTER — POSTPARTUM VISIT (OUTPATIENT)
Dept: OBGYN CLINIC | Age: 31
End: 2021-08-25

## 2021-08-25 VITALS
DIASTOLIC BLOOD PRESSURE: 80 MMHG | BODY MASS INDEX: 32.61 KG/M2 | HEART RATE: 93 BPM | WEIGHT: 191 LBS | SYSTOLIC BLOOD PRESSURE: 110 MMHG | HEIGHT: 64 IN

## 2021-08-25 DIAGNOSIS — Z86.32 HISTORY OF GESTATIONAL DIABETES: Primary | ICD-10-CM

## 2021-08-25 PROBLEM — O99.820 GBS (GROUP B STREPTOCOCCUS CARRIER), +RV CULTURE, CURRENTLY PREGNANT: Status: RESOLVED | Noted: 2021-06-21 | Resolved: 2021-08-25

## 2021-08-25 PROCEDURE — 0503F POSTPARTUM CARE VISIT: CPT | Performed by: OBSTETRICS & GYNECOLOGY

## 2021-08-28 NOTE — PROGRESS NOTES
Greer Bonilla  2021    YOB: 1990        HPI:  Greer Bonilla is a 32 y.o. female [id number]     Patient seen for postpartum visit and NP wanted to confirm that patients known cervical laceration was healing appropriately    Patient denies any prolonged bleeding, no pain, has not attempted intercourse yet, denies any abnormal vaginal discharge        OB History    Para Term  AB Living   4 3 2 1 1 3   SAB TAB Ectopic Molar Multiple Live Births   1 0 0 0 0 3      # Outcome Date GA Lbr Chema/2nd Weight Sex Delivery Anes PTL Lv   4 Term 21 37w0d 04:45 / 00:10 8 lb 14.2 oz (4.03 kg) M Vag-Spont EPI N CASTILLO      Name: Chiki Clearyes: 8  Apgar5: 9   3 Term 18 38w3d 00:48 / 00:43 7 lb 6.9 oz (3.37 kg) F Vag-Spont EPI N CASTILLO      Name: Lauryn In  Apgar5: 9   2  10/18/11 28w0d  3 lb 9 oz (1.616 kg) F Vag-Spont EPI Y CASTILLO      Complications: Placenta previa, Placental abruption in third trimester   1 SAB  13w0d             Birth Comments: had d&c       Past Medical History:   Diagnosis Date    Abnormal Pap smear     approx.      Anemia     not currently taking iron    Arthritis     Complication of anesthesia     low blood pressure and combative upon awaking from general anesthesia    Deviated septum     Diabetes mellitus ([address]     gestational    Endometriosis     GERD (gastroesophageal reflux disease)     Hypertension     gestational last pregnancy 2018 preeclampsia    Postpartum depression     Prolonged emergence from general anesthesia     See blow     Seasonal affective disorder ([address]     and does not take any meds    Spondylisthesis        Past Surgical History:   Procedure Laterality Date    CERVICAL CERCLAGE N/A 3/17/2021    [place] Cervical Cerclage placement performed by Mercedes Ferrera MD at [facility] UTERUS      HERNIA REPAIR  [id number]    umbilical    ID CERCLAGE CERVIX W PREG,VAG APPRCH N/A 2018    CERVIX CERCLAGE performed by Albert Castanon MD at [address] ENDOSCOPY         Family History   Problem Relation Age of Onset    Cancer Father     Diabetes Father     Stroke Father     Stroke Brother        Social History     Socioeconomic History    Marital status: Single     Spouse name: Not on file    Number of children: Not on file    Years of education: Not on file    Highest education level: Not on file   Occupational History    Not on file   Tobacco Use    Smoking status: Former Smoker     Packs/day: 0.50     Quit date: 2020     Years since quittin.7    Smokeless tobacco: Never Used   Vaping Use    Vaping Use: Never used   Substance and Sexual Activity    Alcohol use: Not Currently     Comment: socially    Drug use: No    Sexual activity: Yes     Partners: Male   Other Topics Concern    Not on file   Social History Narrative    Not on file     Social Determinants of Health     Financial Resource Strain: Low Risk     Difficulty of Paying Living Expenses: Not hard at all   Food Insecurity: No Food Insecurity    Worried About [employer] in the Last Year: Never true    [facility] in the Last Year: Never true   Transportation Needs: No Transportation Needs    Lack of Transportation (Medical): No    Lack of Transportation (Non-Medical):  No   Physical Activity:     Days of Exercise per Week:     Minutes of Exercise per Session:    Stress:     Feeling of Stress :    Social Connections:     Frequency of Communication with Friends and Family:     Frequency of Social Gatherings with Friends and Family:     Attends [employer] Services:     Active Member of Clubs or Organizations:     Attends Club or Organization Meetings:     Marital Status:    Intimate Partner Violence:     Fear of Current or Ex-Partner:     Emotionally Abused:     Physically Abused:     Sexually Abused:          MEDICATIONS:  Current Outpatient Medications   Medication Sig Dispense Refill    norelgestromin-ethinyl estradiol (ORTHO EVRA) 150-35 MCG/24HR Place 1 patch onto the skin once a week PLACE ONE NEW PATCH ON SKIN, ON THE SAME DAY EVERY WEEK. 3 patch 1    ibuprofen (ADVIL;MOTRIN) 600 MG tablet Take 1 tablet by mouth every 6 hours as needed for Pain (Patient not taking: Reported on 8/25/2021) 60 tablet 1     No current facility-administered medications for this visit. ALLERGIES:  Allergies as of 08/25/2021 - Fully Reviewed 08/25/2021   Allergen Reaction Noted    Cephalexin Anaphylaxis 05/23/2013       Review Of Systems (11 point):  Constitutional: No fever, chills or malaise; No weight change or fatigue  Head and Eyes: No vision, Headache, Dizziness or trauma in last 12 months  ENT ROS: No hearing, Tinnitis, sinus or taste problems  Hematological and Lymphatic ROS:No Lymphoma, Von Willebrand's, Hemophillia or Bleeding History  Psych ROS: No Depression, Homicidal thoughts,suicidal thoughts, or anxiety  Breast ROS: No prior breast abnormalities or lumps  Respiratory ROS: No SOB, Pneumoniae,Cough, or Pulmonary Embolism History  Cardiovascular ROS: No Chest Pain with Exertion, Palpitations, Syncope, Edema, Arrhythmia  Gastrointestinal ROS: No Indigestion, Heartburn, Nausea, vomiting, Diarrhea, Constipation,or Bowel Changes; No Bloody Stools or melena  Genito-Urinary ROS: No Dysuria, Hematuria or Nocturia. No Urinary Incontinence or Vaginal Discharge  Musculoskeletal ROS: No Arthralgia, Arthritis,Gout,Osteoporosis or Rheumatism  Neurological ROS: No CVA, Migraines, Epilepsy, Seizure Hx, or Limb Weakness  Dermatological ROS: No Rash, Itching, Hives, Mole Changes or Cancer          Blood pressure 110/80, pulse 93, height 5' 4\" (1.626 m), weight 191 lb (86.6 kg), last menstrual period 08/15/2021, not currently breastfeeding.       Abdomen: Soft non-tender; good bowel sounds. No guarding, rebound or rigidity. No CVA tenderness bilaterally. Extremities: No calf tenderness, DTR 2/4, and No edema bilaterally    Pelvic: + healed deep cervical laceration at the 9 o'clock position of cervix that extends to the underlying vaginal mucosa, however all tissue is intact with no evidence of pelvic floor muscle or rectovaginal fascia compromise    Assessment:   Diagnosis Orders   1. History of gestational diabetes  Glucose Tolerance, 2 Hours     Patient Active Problem List    Diagnosis Date Noted     21 M Apg 8/9 Wt 8#14 2021    Insulin controlled gestational diabetes mellitus (GDM) in third trimester 2021    Need for Tdap vaccination 2021    Hypertension affecting pregnancy in third trimester 2021     GESTATIONAL HTN      Vaz Cerclage placed 3/17/21 2021     #5 Ethibond suture, knot @ 12      Incompetent cervix     Bilateral lower extremity edema 03/10/2021    History of cervical cerclage, currently pregnant, second trimester 2020    History of gestational hypertension 2020    S/P Vaz Cervical cerclage placed 18, removed 2018     Knot at 12 o'clock      Noncompliance 2018    Poor historian 2018    Short cervical length during pregnancy in second trimester 2018     16mm CL found 18 in office  17 mm CL found 18 in office.   Patient currently taking Rebekah Deisy H/O PTD (G2 @ 28w) 05/15/2018       PLAN:  Resume regular activity as tolerated  RTO for annual exam when due     Orders Placed This Encounter   Procedures    Glucose Tolerance, 2 Hours

## 2021-10-08 DIAGNOSIS — Z32.02 NEGATIVE PREGNANCY TEST: ICD-10-CM

## 2021-10-08 DIAGNOSIS — N92.6 IRREGULAR MENSES: ICD-10-CM

## 2021-10-08 RX ORDER — NORELGESTROMIN AND ETHINLY ESTRADIOL 150; 35 UG/D; UG/D
PATCH TRANSDERMAL
Qty: 3 PATCH | Refills: 1 | Status: SHIPPED | OUTPATIENT
Start: 2021-10-08 | End: 2021-11-11 | Stop reason: SDUPTHER

## 2021-10-15 ENCOUNTER — HOSPITAL ENCOUNTER (EMERGENCY)
Age: 31
Discharge: HOME | End: 2021-10-15
Attending: EMERGENCY MEDICINE
Payer: COMMERCIAL

## 2021-10-15 VITALS
SYSTOLIC BLOOD PRESSURE: 153 MMHG | OXYGEN SATURATION: 100 % | RESPIRATION RATE: 16 BRPM | BODY MASS INDEX: 32.44 KG/M2 | TEMPERATURE: 97.6 F | DIASTOLIC BLOOD PRESSURE: 88 MMHG | WEIGHT: 190 LBS | HEIGHT: 64 IN | HEART RATE: 90 BPM

## 2021-10-15 DIAGNOSIS — J06.9 ACUTE UPPER RESPIRATORY INFECTION: Primary | ICD-10-CM

## 2021-10-15 LAB
SARS-COV-2, RAPID: NOT DETECTED
SPECIMEN DESCRIPTION: NORMAL

## 2021-10-15 PROCEDURE — 87635 SARS-COV-2 COVID-19 AMP PRB: CPT

## 2021-10-15 PROCEDURE — 99283 EMERGENCY DEPT VISIT LOW MDM: CPT

## 2021-10-15 ASSESSMENT — ENCOUNTER SYMPTOMS
EYE DISCHARGE: 0
VOMITING: 0
COUGH: 1
EYE PAIN: 0
RHINORRHEA: 1
COLOR CHANGE: 0
BLOOD IN STOOL: 0
NAUSEA: 0
CHEST TIGHTNESS: 0
SORE THROAT: 0
ABDOMINAL PAIN: 0
BACK PAIN: 0
DIARRHEA: 0
FACIAL SWELLING: 0
EYE REDNESS: 0
SHORTNESS OF BREATH: 1
TROUBLE SWALLOWING: 0
CONSTIPATION: 0
SINUS PRESSURE: 0
WHEEZING: 0

## 2021-10-15 NOTE — ED NOTES
DC instructions reviewed and pt verbalize understanding of f/u care and reasons to return to ED.  DC TREVON in stable condition with all belongings     Gurdeep Carmona RN  10/15/21 0140

## 2021-10-15 NOTE — Clinical Note
Teodora Andersen was seen and treated in our emergency department on 10/15/2021. She may return to work on 10/15/2021. Patient was evaluated in the emergency department for an upper respiratory infection but her Covid test was negative and therefore I feel comfortable letting her go back to work when she is feeling better. If you have any questions or concerns, please don't hesitate to call.       Junie Aragon MD Detail Level: Zone Detail Level: Generalized

## 2021-10-15 NOTE — ED PROVIDER NOTES
[address]  eMERGENCY dEPARTMENT eNCOUnter      Pt Name: Kristal Zuluaga  MRN: [id number]  [place] 1990  Date of evaluation: 10/15/21      CHIEF COMPLAINT       Chief Complaint   Patient presents with    Concern For COVID-19         HISTORY OF PRESENT ILLNESS    Kristal Zuluaga is a 32 y.o. female who presents complaining of cough. Patient states that she has had a runny nose congestion and a cough with some shortness of breath for the last 2 days. Patient denies fever. Patient denies any abdominal complaints. Patient has no loss of taste or smell. Patient states that a coworker evidently is home right now with possible Covid but they have not heard of any kind of positive test yet. REVIEW OF SYSTEMS       Review of Systems   Constitutional: Negative for activity change, appetite change, chills, diaphoresis and fever. HENT: Positive for congestion and rhinorrhea. Negative for ear pain, facial swelling, nosebleeds, sinus pressure, sore throat and trouble swallowing. Eyes: Negative for pain, discharge and redness. Respiratory: Positive for cough and shortness of breath. Negative for chest tightness and wheezing. Cardiovascular: Negative for chest pain, palpitations and leg swelling. Gastrointestinal: Negative for abdominal pain, blood in stool, constipation, diarrhea, nausea and vomiting. Genitourinary: Negative for difficulty urinating, dysuria, flank pain, frequency, genital sores and hematuria. Musculoskeletal: Negative for arthralgias, back pain, gait problem, joint swelling, myalgias and neck pain. Skin: Negative for color change, pallor, rash and wound. Neurological: Negative for dizziness, tremors, seizures, syncope, speech difficulty, weakness, numbness and headaches. Psychiatric/Behavioral: Negative for confusion, decreased concentration, hallucinations, self-injury, sleep disturbance and suicidal ideas.        PAST MEDICAL HISTORY     Past Medical History: Diagnosis Date    Abnormal Pap smear     approx. 2012    Anemia     not currently taking iron    Arthritis     Complication of anesthesia     low blood pressure and combative upon awaking from general anesthesia    Deviated septum     Diabetes mellitus ([facility] [address] [address]     gestational    Endometriosis     GERD (gastroesophageal reflux disease)     Hypertension     gestational last pregnancy 2018 preeclampsia    Postpartum depression     Prolonged emergence from general anesthesia     See blow     Seasonal affective disorder ([facility] [address]     and does not take any meds    Spondylisthesis        SURGICAL HISTORY       Past Surgical History:   Procedure Laterality Date    CERVICAL CERCLAGE N/A 3/17/2021    [place] Cervical Cerclage placement performed by Man Cuba MD at [address] OF UTERUS      HERNIA REPAIR  [address]    umbilical    [place] CERCLAGE CERVIX W PREG, [address] N/A 6/1/2018    CERVIX CERCLAGE performed by Man Cuba MD at [address] [place] [address]       Current Discharge Medication List      CONTINUE these medications which have NOT CHANGED    Details   ZAFEMY 150-35 MCG/24HR PLACE 1 NEW PATCH ONTO THE SKIN ONCE A WEEK ON THE SAME DAY  Qty: 3 patch, Refills: 1    Comments: ZERO refills remain on this prescription. Your patient is requesting advance approval of refills for this medication to [facility] Diagnoses: Negative pregnancy test; Irregular menses      ibuprofen (ADVIL;MOTRIN) 600 MG tablet Take 1 tablet by mouth every 6 hours as needed for Pain  Qty: 60 tablet, Refills: 1             ALLERGIES     is allergic to cephalexin. SOCIAL HISTORY      reports that she quit smoking about 10 months ago. She smoked 0.50 packs per day. She has never used smokeless tobacco. She reports previous alcohol use.  She reports that she does not use drugs.    PHYSICAL EXAM     INITIAL VITALS: BP (!) 152/87   Pulse 93   Temp 97.6 °F (36.4 °C)   Resp 18   Ht 5' 4\" (1.626 m)   Wt 190 lb (86.2 kg)   LMP 10/08/2021   SpO2 98%   BMI 32.61 kg/m²      Physical Exam  Vitals and nursing note reviewed. Constitutional:       General: She is not in acute distress. Appearance: She is well-developed. She is not diaphoretic. HENT:      Head: Normocephalic and atraumatic. Nose: Congestion present. Eyes:      General: No scleral icterus. Right eye: No discharge. Left eye: No discharge. Conjunctiva/sclera: Conjunctivae normal.      Pupils: Pupils are equal, round, and reactive to light. Cardiovascular:      Rate and Rhythm: Normal rate and regular rhythm. Heart sounds: Normal heart sounds. No murmur heard. No friction rub. No gallop. Pulmonary:      Effort: Pulmonary effort is normal. No respiratory distress. Breath sounds: Normal breath sounds. No wheezing or rales. Chest:      Chest wall: No tenderness. Abdominal:      General: Bowel sounds are normal. There is no distension. Palpations: Abdomen is soft. There is no mass. Tenderness: There is no abdominal tenderness. There is no guarding or rebound. Musculoskeletal:         General: No tenderness. Normal range of motion. Skin:     General: Skin is warm and dry. Coloration: Skin is not pale. Findings: No erythema or rash. Neurological:      Mental Status: She is alert and oriented to person, place, and time. Cranial Nerves: No cranial nerve deficit. Sensory: No sensory deficit. Motor: No abnormal muscle tone. Coordination: Coordination normal.      Deep Tendon Reflexes: Reflexes normal.   Psychiatric:         Behavior: Behavior normal.         Thought Content:  Thought content normal.         Judgment: Judgment normal.         DIAGNOSTIC RESULTS     RADIOLOGY:All plain film, CT,MRI, and formal ultrasound images (except ED bedside ultrasound) are read by the radiologist and the interpretations are directly viewed by the emergency physician. LABS: All lab results were reviewed by myself, and all abnormals are listed below. Labs Reviewed   COVID-19, RAPID         MEDICAL DECISION MAKING:     Patient symptoms are consistent with a viral illness possibly Covid. She does have a possible exposure but she is vaccinated which decreases her likelihood but I do want to order the test to rule her out. Patient otherwise looks well with normal vital signs and will be okay for discharge home. EMERGENCY DEPARTMENT COURSE:   Vitals:    Vitals:    10/15/21 0050   BP: (!) 152/87   Pulse: 93   Resp: 18   Temp: 97.6 °F (36.4 °C)   SpO2: 98%   Weight: 190 lb (86.2 kg)   Height: 5' 4\" (1.626 m)       The patient was given the following medications while in the emergency department:  No orders of the defined types were placed in this encounter. -------------------------  1:26 AM EDT  Patient was updated on results. Patient will be okay to go back to work when she is feeling better. CONSULTS:  None    PROCEDURES:  None    FINAL IMPRESSION      1.  Acute upper respiratory infection          DISPOSITION/PLAN   DISPOSITION Decision To Discharge 10/15/2021 01:26:03 AM      PATIENT REFERREDTO:  [facility] ED  [facility], [facility] [address]  [phone]    If symptoms worsen      DISCHARGEMEDICATIONS:  Current Discharge Medication List          (Please note that portions of this note were completed with a voice recognition program.  Efforts were made to edit thedictations but occasionally words are mis-transcribed.)    Lesley Sahu MD  Attending Emergency Physician                        Lesley Sahu MD  10/15/21 1394

## 2021-10-21 ENCOUNTER — HOSPITAL ENCOUNTER (EMERGENCY)
Age: 31
Discharge: HOME | End: 2021-10-21
Attending: EMERGENCY MEDICINE
Payer: COMMERCIAL

## 2021-10-21 VITALS
RESPIRATION RATE: 16 BRPM | DIASTOLIC BLOOD PRESSURE: 81 MMHG | OXYGEN SATURATION: 96 % | HEIGHT: 64 IN | WEIGHT: 180 LBS | HEART RATE: 98 BPM | SYSTOLIC BLOOD PRESSURE: 123 MMHG | TEMPERATURE: 97.5 F | BODY MASS INDEX: 30.73 KG/M2

## 2021-10-21 DIAGNOSIS — R10.12 LEFT UPPER QUADRANT ABDOMINAL PAIN: Primary | ICD-10-CM

## 2021-10-21 DIAGNOSIS — R11.2 NAUSEA AND VOMITING, INTRACTABILITY OF VOMITING NOT SPECIFIED, UNSPECIFIED VOMITING TYPE: ICD-10-CM

## 2021-10-21 LAB
ABSOLUTE EOS #: 0.4 K/UL (ref 0–0.4)
ABSOLUTE IMMATURE GRANULOCYTE: ABNORMAL K/UL (ref 0–0.3)
ABSOLUTE LYMPH #: 3.1 K/UL (ref 1–4.8)
ABSOLUTE MONO #: 0.6 K/UL (ref 0.1–1.3)
ALBUMIN SERPL-MCNC: 4.1 G/DL (ref 3.5–5.2)
ALBUMIN/GLOBULIN RATIO: ABNORMAL (ref 1–2.5)
ALP BLD-CCNC: 135 U/L (ref 35–104)
ALT SERPL-CCNC: 63 U/L (ref 5–33)
ANION GAP SERPL CALCULATED.3IONS-SCNC: 10 MMOL/L (ref 9–17)
AST SERPL-CCNC: 33 U/L
BASOPHILS # BLD: 1 % (ref 0–2)
BASOPHILS ABSOLUTE: 0.1 K/UL (ref 0–0.2)
BILIRUB SERPL-MCNC: 0.2 MG/DL (ref 0.3–1.2)
BILIRUBIN URINE: NEGATIVE
BUN BLDV-MCNC: 9 MG/DL (ref 6–20)
BUN/CREAT BLD: ABNORMAL (ref 9–20)
CALCIUM SERPL-MCNC: 9.5 MG/DL (ref 8.6–10.4)
CHLORIDE BLD-SCNC: 103 MMOL/L (ref 98–107)
CO2: 24 MMOL/L (ref 20–31)
COLOR: YELLOW
COMMENT UA: NORMAL
CREAT SERPL-MCNC: 0.65 MG/DL (ref 0.5–0.9)
DIFFERENTIAL TYPE: ABNORMAL
EOSINOPHILS RELATIVE PERCENT: 4 % (ref 0–4)
GFR AFRICAN AMERICAN: >60 ML/MIN
GFR NON-AFRICAN AMERICAN: >60 ML/MIN
GFR SERPL CREATININE-BSD FRML MDRD: ABNORMAL ML/MIN/{1.73_M2}
GFR SERPL CREATININE-BSD FRML MDRD: ABNORMAL ML/MIN/{1.73_M2}
GLUCOSE BLD-MCNC: 98 MG/DL (ref 70–99)
GLUCOSE URINE: NEGATIVE
HCG(URINE) PREGNANCY TEST: NEGATIVE
HCT VFR BLD CALC: 34 % (ref 36–46)
HEMOGLOBIN: 12 G/DL (ref 12–16)
IMMATURE GRANULOCYTES: ABNORMAL %
KETONES, URINE: NEGATIVE
LEUKOCYTE ESTERASE, URINE: NEGATIVE
LIPASE: 17 U/L (ref 13–60)
LYMPHOCYTES # BLD: 29 % (ref 24–44)
MCH RBC QN AUTO: 32.7 PG (ref 26–34)
MCHC RBC AUTO-ENTMCNC: 35.2 G/DL (ref 31–37)
MCV RBC AUTO: 92.9 FL (ref 80–100)
MONOCYTES # BLD: 5 % (ref 1–7)
NITRITE, URINE: NEGATIVE
NRBC AUTOMATED: ABNORMAL PER 100 WBC
PDW BLD-RTO: 13.1 % (ref 11.5–14.9)
PH UA: 7 (ref 5–8)
PLATELET # BLD: 234 K/UL (ref 150–450)
PLATELET ESTIMATE: ABNORMAL
PMV BLD AUTO: 8.7 FL (ref 6–12)
POTASSIUM SERPL-SCNC: 4.1 MMOL/L (ref 3.7–5.3)
PROTEIN UA: NEGATIVE
RBC # BLD: 3.66 M/UL (ref 4–5.2)
RBC # BLD: ABNORMAL 10*6/UL
SEG NEUTROPHILS: 61 % (ref 36–66)
SEGMENTED NEUTROPHILS ABSOLUTE COUNT: 6.4 K/UL (ref 1.3–9.1)
SODIUM BLD-SCNC: 137 MMOL/L (ref 135–144)
SPECIFIC GRAVITY UA: 1.01 (ref 1–1.03)
TOTAL PROTEIN: 7 G/DL (ref 6.4–8.3)
TURBIDITY: CLEAR
URINE HGB: NEGATIVE
UROBILINOGEN, URINE: NORMAL
WBC # BLD: 10.6 K/UL (ref 3.5–11)
WBC # BLD: ABNORMAL 10*3/UL

## 2021-10-21 PROCEDURE — 99283 EMERGENCY DEPT VISIT LOW MDM: CPT

## 2021-10-21 PROCEDURE — 85025 COMPLETE CBC W/AUTO DIFF WBC: CPT

## 2021-10-21 PROCEDURE — 96374 THER/PROPH/DIAG INJ IV PUSH: CPT

## 2021-10-21 PROCEDURE — 80053 COMPREHEN METABOLIC PANEL: CPT

## 2021-10-21 PROCEDURE — 36415 COLL VENOUS BLD VENIPUNCTURE: CPT

## 2021-10-21 PROCEDURE — 96375 TX/PRO/DX INJ NEW DRUG ADDON: CPT

## 2021-10-21 PROCEDURE — 81025 URINE PREGNANCY TEST: CPT

## 2021-10-21 PROCEDURE — 81003 URINALYSIS AUTO W/O SCOPE: CPT

## 2021-10-21 PROCEDURE — 6360000002 HC RX W HCPCS: Performed by: STUDENT IN AN ORGANIZED HEALTH CARE EDUCATION/TRAINING PROGRAM

## 2021-10-21 PROCEDURE — 83690 ASSAY OF LIPASE: CPT

## 2021-10-21 RX ORDER — FENTANYL CITRATE 50 UG/ML
25 INJECTION, SOLUTION INTRAMUSCULAR; INTRAVENOUS ONCE
Status: COMPLETED | OUTPATIENT
Start: 2021-10-21 | End: 2021-10-21

## 2021-10-21 RX ORDER — ONDANSETRON 2 MG/ML
4 INJECTION INTRAMUSCULAR; INTRAVENOUS ONCE
Status: COMPLETED | OUTPATIENT
Start: 2021-10-21 | End: 2021-10-21

## 2021-10-21 RX ORDER — ONDANSETRON 4 MG/1
4 TABLET, ORALLY DISINTEGRATING ORAL 3 TIMES DAILY PRN
Qty: 21 TABLET | Refills: 0 | Status: SHIPPED | OUTPATIENT
Start: 2021-10-21 | End: 2021-11-11

## 2021-10-21 RX ADMIN — FENTANYL CITRATE 25 MCG: 50 INJECTION, SOLUTION INTRAMUSCULAR; INTRAVENOUS at 19:51

## 2021-10-21 RX ADMIN — ONDANSETRON 4 MG: 2 INJECTION INTRAMUSCULAR; INTRAVENOUS at 19:50

## 2021-10-21 ASSESSMENT — ENCOUNTER SYMPTOMS
NAUSEA: 1
SORE THROAT: 0
VOMITING: 1
COUGH: 0
RHINORRHEA: 0
ABDOMINAL DISTENTION: 0
DIARRHEA: 1
SHORTNESS OF BREATH: 0
CONSTIPATION: 0
ABDOMINAL PAIN: 1

## 2021-10-21 ASSESSMENT — PAIN SCALES - GENERAL
PAINLEVEL_OUTOF10: 7
PAINLEVEL_OUTOF10: 8

## 2021-10-21 NOTE — ED TRIAGE NOTES
Mode of arrival (squad #, walk in, police, etc) : walk in        Chief complaint(s): abd pain        Arrival Note (brief scenario, treatment PTA, etc). : Pt states she has left sided abdominal pain x1 day. Reports some nausea, one episode of emesis and some diarrhea/constipation. Pt reports 2 stools that were black. C= \"Have you ever felt that you should Cut down on your drinking? \"  No  A= \"Have people Annoyed you by criticizing your drinking? \"  No  G= \"Have you ever felt bad or Guilty about your drinking? \"  No  E= \"Have you ever had a drink as an Eye-opener first thing in the morning to steady your nerves or to help a hangover? \"  No      Deferred []      Reason for deferring: N/A    *If yes to two or more: probable alcohol abuse. *

## 2021-10-21 NOTE — ED PROVIDER NOTES
[facility] ED  Emergency Department Encounter  Emergency Medicine Resident     Pt Name: Sofia Hyde  MRN: [id number]  [place] 1990  Date of evaluation: 10/21/21  PCP:  No primary care provider on file. CHIEF COMPLAINT       Chief Complaint   Patient presents with    Abdominal Pain       HISTORY OFPRESENT ILLNESS  (Location/Symptom, Timing/Onset, Context/Setting, Quality, Duration, Modifying Factors,Severity.)      Sofia Hyde is a 32 y.o. female who presents with left upper quadrant pain, nausea, vomiting and diarrhea since 3 AM on 10/20. Patient works a swing shift and noticed she felt ill at work. Has only had one episode of emesis. Pain feels cramping in nature. No dysuria, hematuria, flank pain, fevers, chills, constipation. No known sick contacts. She was able to eat lunch today. She has not drank alcohol for the past month. She is 3 months postpartum from a vaginal delivery and says her menstrual periods have resumed and are regular. Last menstrual period 10/8. PAST MEDICAL / SURGICAL / SOCIAL / FAMILY HISTORY      has a past medical history of Abnormal Pap smear, Anemia, Arthritis, Complication of anesthesia, Deviated septum, Diabetes mellitus ([address] Endometriosis, GERD (gastroesophageal reflux disease), Hypertension, Postpartum depression, Prolonged emergence from general anesthesia, Seasonal affective disorder ([address] and Spondylisthesis. has a past surgical history that includes Dilation and curettage of uterus; Cholecystectomy; Colonoscopy; Upper gastrointestinal endoscopy; Colposcopy; hernia repair (2012); pr cerclage cervix w preg,vag apprch (N/A, 6/1/2018); and Cervical Cerclage (N/A, 3/17/2021). Social:  reports that she quit smoking about 10 months ago. She smoked 0.50 packs per day. She has never used smokeless tobacco. She reports previous alcohol use. She reports that she does not use drugs.     Family Hx:   Family History   Problem Relation Age of Onset    Cancer Father     Diabetes Father     Stroke Father     Stroke Brother         Allergies:  Cephalexin    Home Medications:  Prior to Admission medications    Medication Sig Start Date End Date Taking? Authorizing Provider   ondansetron (ZOFRAN-ODT) 4 MG disintegrating tablet Take 1 tablet by mouth 3 times daily as needed for Nausea or Vomiting 10/21/21  Yes MD PATRICK Barrios 150-35 MCG/24HR PLACE 1 NEW PATCH ONTO THE SKIN ONCE A WEEK ON THE SAME DAY 10/8/21   GOSIA Brooks - CNP   ibuprofen (ADVIL;MOTRIN) 600 MG tablet Take 1 tablet by mouth every 6 hours as needed for Pain  Patient not taking: Reported on 8/25/2021 6/28/21 8/2/21  Lethea Martin, DO       REVIEW OFSYSTEMS    (2-9 systems for level 4, 10 or more for level 5)      Review of Systems   Constitutional: Negative for appetite change, chills, fatigue and fever. HENT: Negative for congestion, rhinorrhea, sneezing and sore throat. Eyes: Negative for visual disturbance. Respiratory: Negative for cough and shortness of breath. Cardiovascular: Negative for chest pain and leg swelling. Gastrointestinal: Positive for abdominal pain, diarrhea, nausea and vomiting. Negative for abdominal distention and constipation. Genitourinary: Negative for decreased urine volume, dysuria, flank pain, pelvic pain, vaginal bleeding and vaginal discharge. Musculoskeletal: Negative for myalgias, neck pain and neck stiffness. Skin: Negative for rash and wound. Neurological: Negative for dizziness, syncope, light-headedness and headaches. Psychiatric/Behavioral: Negative for dysphoric mood and suicidal ideas.        PHYSICAL EXAM   (up to 7 for level 4, 8 or more forlevel 5)      INITIAL VITALS:   Vitals:    10/21/21 2030   BP: 123/81   Pulse:    Resp:    Temp:    SpO2: 96%    /81   Pulse 98   Temp 97.5 °F (36.4 °C) (Temporal)   Resp 16   Ht 5' 4\" (1.626 m)   Wt 180 lb (81.6 kg)   LMP 10/08/2021   SpO2 96%   BMI 30.90 kg/m²       Physical Exam  Vitals and nursing note reviewed. Constitutional:       General: She is not in acute distress. Appearance: Normal appearance. She is not ill-appearing or diaphoretic. HENT:      Head: Normocephalic. Nose: Nose normal.      Mouth/Throat:      Mouth: Mucous membranes are moist.      Pharynx: Oropharynx is clear. Eyes:      Extraocular Movements: Extraocular movements intact. Conjunctiva/sclera: Conjunctivae normal.      Pupils: Pupils are equal, round, and reactive to light. Cardiovascular:      Rate and Rhythm: Normal rate and regular rhythm. Pulses: Normal pulses. Heart sounds: Normal heart sounds. Pulmonary:      Effort: Pulmonary effort is normal. No respiratory distress. Breath sounds: Normal breath sounds. No wheezing or rales. Chest:      Chest wall: No tenderness. Abdominal:      General: There is no distension. Palpations: Abdomen is soft. Tenderness: There is no abdominal tenderness. There is no guarding or rebound. Musculoskeletal:         General: Normal range of motion. Cervical back: Normal range of motion and neck supple. Skin:     General: Skin is warm. Capillary Refill: Capillary refill takes less than 2 seconds. Neurological:      General: No focal deficit present. Mental Status: She is alert and oriented to person, place, and time. Psychiatric:         Mood and Affect: Mood normal.         Behavior: Behavior normal.         DIFFERENTIAL  DIAGNOSIS     Initial MDM/Plan: 32 y.o. female who presents with left upper quadrant abdominal pain, nausea, vomiting, diarrhea. Vital signs reviewed, within normal limits. On physical examination her abdomen is soft, nondistended nontender.  exam deferred as the patient is not complaining of any vaginal discharge, abnormal vaginal bleeding, dysuria or flank pain. Considering gastroenteritis, pancreatitis, gastritis, Covid.   Plan to obtain CBC, CMP, lipase, pregnancy and urinalysis. Analgesia and Zofran. Patient declined Covid test.  Will reassess. DIAGNOSTIC RESULTS / EMERGENCYDEPARTMENT COURSE / MDM     LABS:  Labs Reviewed   CBC WITH AUTO DIFFERENTIAL - Abnormal; Notable for the following components:       Result Value    RBC 3.66 (*)     Hematocrit 34.0 (*)     All other components within normal limits   COMPREHENSIVE METABOLIC PANEL W/ REFLEX TO MG FOR LOW K - Abnormal; Notable for the following components:    Alkaline Phosphatase 135 (*)     ALT 63 (*)     AST 33 (*)     Total Bilirubin 0.20 (*)     All other components within normal limits   PREGNANCY, URINE   LIPASE   URINE RT REFLEX TO CULTURE       RADIOLOGY:  No results found. EMERGENCY DEPARTMENT COURSE:  ED Course as of Oct 21 2113   Thu Oct 21, 2021   2113 Discussed unremarkable work-up with patient. Patient was relieved, declining Covid test.  Requesting Zofran for discharge. Prescription sent to pharmacy electronically. [JT]      ED Course User Index  [JT] Agatha Abad MD       PROCEDURES:  None    CONSULTS:  None      FINAL IMPRESSION      1. Left upper quadrant abdominal pain    2.  Nausea and vomiting, intractability of vomiting not specified, unspecified vomiting type        DISPOSITION / PLAN     DISPOSITION Decision To Discharge 10/21/2021 08:47:24 PM      PATIENT REFERRED TO:  [facility] [address] [address]  [phone]  Go to   If symptoms worsen      DISCHARGE MEDICATIONS:  Discharge Medication List as of 10/21/2021  8:49 PM      START taking these medications    Details   ondansetron (ZOFRAN-ODT) 4 MG disintegrating tablet Take 1 tablet by mouth 3 times daily as needed for Nausea or Vomiting, Disp-21 tablet, R-0Normal             Agatha Abad MD  Emergency Medicine Resident    (Please note that portions of this note were completed with a voice recognition program.Efforts were made to edit the dictations but occasionally words are mis-transcribed.)        Debora Tiwari MD  Resident  10/21/21 9969

## 2021-10-21 NOTE — ED PROVIDER NOTES
2017    Kevyn MENENDEZ  [address]  [place] [address]    Dear Kevyn:    [facility] wants to ensure your discharge home is safe and you or your loved ones have had all of your questions answered regarding your care after you leave the hospital.    Below is a list of resources and contact information should you have any questions regarding your hospital stay, follow-up instructions, or active medical symptoms.    Questions or Concerns Regarding… Contact   Medical Questions Related to Your Discharge  (7 days a week, 8am-5pm) Contact a Nurse Care Coordinator   [phone]   Medical Questions Not Related to Your Discharge  (24 hours a day / 7 days a week)  Contact the Nurse Health Line   [phone]    Medications or Discharge Instructions Refer to your discharge packet   or contact your [facility] Primary Care Provider   [phone]   Follow-up Appointment(s) Schedule your appointment via Strut   or contact Scheduling [phone]   Billing Review your statement via Strut  or contact Billing [phone]   Medical Records Review your records via Strut   or contact Medical Records [phone]     You may receive a telephone call within two days of discharge. This call is to make certain you understand your discharge instructions and have the opportunity to have any questions answered. You can also easily access your medical information, test results and upcoming appointments via the Strut free online health management tool. You can learn more and sign up at [email or web address]. For assistance setting up your Strut account, please call [phone].    Once again, we want to ensure your discharge home is safe and that you have a clear understanding of any next steps in your care. If you have any questions or concerns, please do not hesitate to contact us, we are here for you. Thank you for choosing [facility] for your healthcare needs.    Sincerely,    Your [facility] Healthcare Team          EMERGENCY DEPARTMENT ENCOUNTER   ATTENDING ATTESTATION     Pt Name: Bola Carmen  MRN: [id number]  [place] 1990  Date of evaluation: 10/21/21       Bola Carmen is a 32 y.o. female who presents with Abdominal Pain      MDM: 26-year-old female presents with complaint of left upper quadrant abdominal pain. On initial exam patient in no acute distress, vitals are stable, tenderness on the left upper quadrant, will check labs    Labs reviewed and unremarkable    Patient reevaluated reports she is feeling better      Discussed results with patient, discussed possible diagnoses, discussed need for follow-up with primary care physician and return precautions, patient voiced understanding is comfortable with plan and discharge home    Patient/Guardian was informed of their diagnosis and told to follow up with PCP  in 1-3 days. Patient demonstrates understanding and agreement with the plan. They were given the opportunity to ask questions and those questions were answered to the best of our ability with the available information. Patient/Guardian told to return to the ED for any new, worsening, changing or persistent symptoms. This dictation was prepared using [employer] voice recognition software. Vitals:   Vitals:    10/21/21 1945 10/21/21 2000 10/21/21 2015 10/21/21 2030   BP: 128/85 122/84 116/75 123/81   Pulse:       Resp:       Temp:       TempSrc:       SpO2: 96% 94% 97% 96%   Weight:       Height:             I personally evaluated and examined the patient in conjunction with the resident and agree with the assessment, treatment plan, and disposition of the patient as recorded by the resident. I performed a history and physical examination of the patient and discussed management with the resident. I reviewed the residents note and agree with the documented findings and plan of care. Any areas of disagreement are noted on the chart.  I was personally present for the key portions of any procedures. I have documented in the chart those procedures where I was not present during the key portions. I have personally reviewed all images and agree with the resident's interpretation. I have reviewed the emergency nurses triage note. I agree with the chief complaint, past medical history, past surgical history, allergies, medications, social and family history as documented unless otherwise noted. The care is provided during an unprecedented national emergency due to the novel coronavirus, COVID 19.   Mango Contreras DO  Attending Emergency Physician          Mango Contreras DO  10/21/21 2120

## 2021-11-11 ENCOUNTER — OFFICE VISIT (OUTPATIENT)
Dept: OBGYN CLINIC | Age: 31
End: 2021-11-11
Payer: COMMERCIAL

## 2021-11-11 VITALS
BODY MASS INDEX: 29.71 KG/M2 | WEIGHT: 174 LBS | DIASTOLIC BLOOD PRESSURE: 72 MMHG | HEIGHT: 64 IN | SYSTOLIC BLOOD PRESSURE: 110 MMHG | HEART RATE: 94 BPM

## 2021-11-11 DIAGNOSIS — Z32.02 NEGATIVE PREGNANCY TEST: ICD-10-CM

## 2021-11-11 DIAGNOSIS — Z87.42 HISTORY OF IRREGULAR MENSTRUAL CYCLES: ICD-10-CM

## 2021-11-11 DIAGNOSIS — N92.6 IRREGULAR MENSES: ICD-10-CM

## 2021-11-11 DIAGNOSIS — Z78.9 USES HORMONAL CONTRACEPTIVE PATCH AS PRIMARY BIRTH CONTROL METHOD: Primary | ICD-10-CM

## 2021-11-11 PROCEDURE — 99213 OFFICE O/P EST LOW 20 MIN: CPT | Performed by: CLINICAL NURSE SPECIALIST

## 2021-11-11 RX ORDER — NORELGESTROMIN AND ETHINLY ESTRADIOL 150; 35 UG/D; UG/D
PATCH TRANSDERMAL
Qty: 3 PATCH | Refills: 8 | Status: SHIPPED | OUTPATIENT
Start: 2021-11-11 | End: 2022-02-28

## 2021-11-11 ASSESSMENT — ENCOUNTER SYMPTOMS
EYES NEGATIVE: 1
GASTROINTESTINAL NEGATIVE: 1
ALLERGIC/IMMUNOLOGIC NEGATIVE: 1
RESPIRATORY NEGATIVE: 1

## 2021-11-11 NOTE — PROGRESS NOTES
Subjective:      Patient ID:  Carrillo Rojo is a 32 y.o. female who presents for   Chief Complaint   Patient presents with    Medication Check       HPI     Patient is a 33 yo female who presents for birth control patch follow up. Patient reports patch is working well and she is not having any issue with adhesion. Patient reports that her menses are regular coming once monthly. Review of Systems   Constitutional: Negative for chills and fever. HENT: Negative. Eyes: Negative. Respiratory: Negative. Cardiovascular: Negative. Gastrointestinal: Negative. Endocrine: Negative. Genitourinary: Negative for dysuria, menstrual problem (once monhtly menses) and vaginal discharge. Musculoskeletal: Negative. Skin: Negative. Allergic/Immunologic: Negative. Neurological: Negative. Hematological: Negative. Psychiatric/Behavioral: Negative. /72   Pulse 94   Ht 5' 4\" (1.626 m)   Wt 174 lb (78.9 kg)   LMP 11/05/2021   BMI 29.87 kg/m²    Patient's last menstrual period was 11/05/2021. Family History   Problem Relation Age of Onset    Cancer Father     Diabetes Father     Stroke Father     Stroke Brother       Past Medical History:   Diagnosis Date    Abnormal Pap smear     approx.  2012    Anemia     not currently taking iron    Arthritis     Complication of anesthesia     low blood pressure and combative upon awaking from general anesthesia    Deviated septum     Diabetes mellitus ([address]     gestational    Endometriosis     GERD (gastroesophageal reflux disease)     Hypertension     gestational last pregnancy 2018 preeclampsia    Postpartum depression     Prolonged emergence from general anesthesia     See blow     Seasonal affective disorder ([address]     and does not take any meds    Spondylisthesis       Past Surgical History:   Procedure Laterality Date    CERVICAL CERCLAGE N/A 3/17/2021    Rina Cervical Cerclage placement performed by Dhiraj WALKER Zohra Cadena MD at [facility] AND CURETTAGE OF UTERUS      HERNIA REPAIR  [id number]    umbilical    MT CERCLAGE CERVIX W PREG, W Laura Prince N/A 2018    CERVIX CERCLAGE performed by Estrella Holcomb MD at [address]        Social History     Socioeconomic History    Marital status: Single     Spouse name: None    Number of children: None    Years of education: None    Highest education level: None   Occupational History    None   Tobacco Use    Smoking status: Former Smoker     Packs/day: 0.50     Quit date: 2020     Years since quittin.9    Smokeless tobacco: Never Used   Vaping Use    Vaping Use: Never used   Substance and Sexual Activity    Alcohol use: Not Currently     Comment: socially    Drug use: No    Sexual activity: Yes     Partners: Male   Other Topics Concern    None   Social History Narrative    None     Social Determinants of Health     Financial Resource Strain: Low Risk     Difficulty of Paying Living Expenses: Not hard at all   Food Insecurity: No Food Insecurity    Worried About Running Out of Food in the Last Year: Never true    Rhonda of Food in the Last Year: Never true   Transportation Needs: No Transportation Needs    Lack of Transportation (Medical): No    Lack of Transportation (Non-Medical):  No   Physical Activity:     Days of Exercise per Week: Not on file    Minutes of Exercise per Session: Not on file   Stress:     Feeling of Stress : Not on file   Social Connections:     Frequency of Communication with Friends and Family: Not on file    Frequency of Social Gatherings with Friends and Family: Not on file    Attends [employer] Services: Not on file    Active Member of Clubs or Organizations: Not on file    Attends Club or Organization Meetings: Not on file    Marital Status: Not on file   Intimate Partner Violence:     Fear of Current or Ex-Partner: Not on file    Emotionally Abused: Not on file    Physically Abused: Not on file    Sexually Abused: Not on file   Housing Stability:     Unable to Pay for Housing in the Last Year: Not on file    Number of [place] in the Last Year: Not on file    Unstable Housing in the Last Year: Not on file      Current Outpatient Medications   Medication Sig Dispense Refill    norelgestromin-ethinyl estradiol (ZAFEMY) 150-35 MCG/24HR PLACE 1 NEW PATCH ONTO THE SKIN ONCE A WEEK ON THE SAME DAY 3 patch 8    ibuprofen (ADVIL;MOTRIN) 600 MG tablet Take 1 tablet by mouth every 6 hours as needed for Pain 60 tablet 1     No current facility-administered medications for this visit. Objective:   Physical Exam  Vitals reviewed. Constitutional:       Appearance: She is well-developed. HENT:      Head: Normocephalic and atraumatic. Eyes:      Conjunctiva/sclera: Conjunctivae normal.   Cardiovascular:      Rate and Rhythm: Normal rate and regular rhythm. Pulmonary:      Effort: Pulmonary effort is normal.      Breath sounds: Normal breath sounds. Musculoskeletal:         General: Normal range of motion. Cervical back: Normal range of motion and neck supple. Skin:     General: Skin is warm and dry. Neurological:      Mental Status: She is oriented to person, place, and time. Psychiatric:         Behavior: Behavior normal.         Thought Content: Thought content normal.         Judgment: Judgment normal.         Assessment:      Diagnosis Orders   1. Uses hormonal contraceptive patch as primary birth control method     2. History of irregular menstrual cycles     3. Negative pregnancy test  norelgestromin-ethinyl estradiol (ZAFEMY) 150-35 MCG/24HR   4. Irregular menses  norelgestromin-ethinyl estradiol (ZAFEMY) 150-35 MCG/24HR   Xulane samples given 2  Lot: [id number]  Exp: 10/22        Plan:      Return in about 9 months (around 8/11/2022) for annual exam and birth control maintenance.     Patient was seen with total face to face time of 20 minutes. More than 50% of this visit was on counseling andeducation regarding the problems listed below and her options. She was also counseled on her preventative health maintenance recommendations and follow-up.     Electronically signed by: Radha Rodriguez CNP

## 2022-02-28 ENCOUNTER — OFFICE VISIT (OUTPATIENT)
Dept: OBGYN CLINIC | Age: 32
End: 2022-02-28
Payer: COMMERCIAL

## 2022-02-28 VITALS
SYSTOLIC BLOOD PRESSURE: 122 MMHG | BODY MASS INDEX: 29.37 KG/M2 | HEIGHT: 64 IN | HEART RATE: 98 BPM | WEIGHT: 172 LBS | DIASTOLIC BLOOD PRESSURE: 86 MMHG

## 2022-02-28 DIAGNOSIS — N92.6 IRREGULAR MENSES: Primary | ICD-10-CM

## 2022-02-28 DIAGNOSIS — N94.6 DYSMENORRHEA: ICD-10-CM

## 2022-02-28 DIAGNOSIS — Z32.02 NEGATIVE PREGNANCY TEST: ICD-10-CM

## 2022-02-28 DIAGNOSIS — Z30.018 ENCOUNTER FOR PRESCRIPTION FOR NUVARING: ICD-10-CM

## 2022-02-28 DIAGNOSIS — Z87.42 HISTORY OF IRREGULAR MENSTRUAL BLEEDING: ICD-10-CM

## 2022-02-28 LAB
CONTROL: NORMAL
PREGNANCY TEST URINE, POC: NEGATIVE

## 2022-02-28 PROCEDURE — 81025 URINE PREGNANCY TEST: CPT | Performed by: CLINICAL NURSE SPECIALIST

## 2022-02-28 PROCEDURE — 99213 OFFICE O/P EST LOW 20 MIN: CPT | Performed by: CLINICAL NURSE SPECIALIST

## 2022-02-28 RX ORDER — ETONOGESTREL AND ETHINYL ESTRADIOL 11.7; 2.7 MG/1; MG/1
1 INSERT, EXTENDED RELEASE VAGINAL
Qty: 1 EACH | Refills: 2 | Status: SHIPPED | OUTPATIENT
Start: 2022-02-28 | End: 2022-07-06

## 2022-02-28 SDOH — ECONOMIC STABILITY: TRANSPORTATION INSECURITY
IN THE PAST 12 MONTHS, HAS THE LACK OF TRANSPORTATION KEPT YOU FROM MEDICAL APPOINTMENTS OR FROM GETTING MEDICATIONS?: NO

## 2022-02-28 SDOH — ECONOMIC STABILITY: FOOD INSECURITY: WITHIN THE PAST 12 MONTHS, THE FOOD YOU BOUGHT JUST DIDN'T LAST AND YOU DIDN'T HAVE MONEY TO GET MORE.: NEVER TRUE

## 2022-02-28 SDOH — ECONOMIC STABILITY: FOOD INSECURITY: WITHIN THE PAST 12 MONTHS, YOU WORRIED THAT YOUR FOOD WOULD RUN OUT BEFORE YOU GOT MONEY TO BUY MORE.: NEVER TRUE

## 2022-02-28 ASSESSMENT — PATIENT HEALTH QUESTIONNAIRE - PHQ9
SUM OF ALL RESPONSES TO PHQ QUESTIONS 1-9: 2
SUM OF ALL RESPONSES TO PHQ9 QUESTIONS 1 & 2: 2
1. LITTLE INTEREST OR PLEASURE IN DOING THINGS: 1
2. FEELING DOWN, DEPRESSED OR HOPELESS: 1
SUM OF ALL RESPONSES TO PHQ QUESTIONS 1-9: 2

## 2022-02-28 ASSESSMENT — ENCOUNTER SYMPTOMS
RESPIRATORY NEGATIVE: 1
ALLERGIC/IMMUNOLOGIC NEGATIVE: 1
EYES NEGATIVE: 1
GASTROINTESTINAL NEGATIVE: 1

## 2022-02-28 ASSESSMENT — SOCIAL DETERMINANTS OF HEALTH (SDOH): HOW HARD IS IT FOR YOU TO PAY FOR THE VERY BASICS LIKE FOOD, HOUSING, MEDICAL CARE, AND HEATING?: NOT HARD AT ALL

## 2022-02-28 NOTE — PROGRESS NOTES
nuvarSubjective:      Patient ID:  Grant Porras is a 28 y.o. female who presents for   Chief Complaint   Patient presents with    Medication Problem       HPI     Patient is a 29 yo female who presents to change birth control. Patient reports that she was using the birth control patch successfully until she got Covid in Dec. Once she had covid she states that she was getting a very bad rash from the patch and she stopped using it and would like to try another form. Patient has a history of irregular menses. Patient reports that she feels pressure in the vaginal area and feels like she is sitting on a ball. Review of Systems   Constitutional: Negative for chills and fever. HENT: Negative. Eyes: Negative. Respiratory: Negative. Cardiovascular: Negative. Gastrointestinal: Negative. Endocrine: Negative. Genitourinary: Positive for menstrual problem (irregular menses and when she has a menses she has cramping). Negative for dysuria and vaginal discharge. Pelvic pressure, feels like she is sitting on a ball   Musculoskeletal: Negative. Skin: Negative. Allergic/Immunologic: Negative. Neurological: Negative. Hematological: Negative. Psychiatric/Behavioral: Negative. /86   Pulse 98   Ht 5' 4\" (1.626 m)   Wt 172 lb (78 kg)   LMP  (LMP Unknown)   BMI 29.52 kg/m²    No LMP recorded (lmp unknown). Family History   Problem Relation Age of Onset    Cancer Father     Diabetes Father     Stroke Father     Stroke Brother       Past Medical History:   Diagnosis Date    Abnormal Pap smear     approx.  2012    Anemia     not currently taking iron    Arthritis     Complication of anesthesia     low blood pressure and combative upon awaking from general anesthesia    Deviated septum     Diabetes mellitus ([address]     gestational    Endometriosis     GERD (gastroesophageal reflux disease)     Hypertension     gestational last pregnancy 2018 preeclampsia    Postpartum depression     Prolonged emergence from general anesthesia     See blow     Seasonal affective disorder (H[facility])     and does not take any meds    Spondylisthesis       Past Surgical History:   Procedure Laterality Date    CERVICAL CERCLAGE N/A 3/17/2021    [place] Cervical Cerclage placement performed by Karen Sinclair MD at [address] [facility] AND CURETTAGE OF UTERUS      HERNIA REPAIR  [id number]    umbilical    ME CERCLAGE CERVIX W PREG, W [place] N/A 2018    CERVIX CERCLAGE performed by Karen Sinclair MD at [address]        Social History     Socioeconomic History    Marital status: Single     Spouse name: None    Number of children: None    Years of education: None    Highest education level: None   Occupational History    None   Tobacco Use    Smoking status: Current Every Day Smoker     Packs/day: 0.50     Last attempt to quit: 2020     Years since quittin.2    Smokeless tobacco: Never Used   Vaping Use    Vaping Use: Never used   Substance and Sexual Activity    Alcohol use: Not Currently     Comment: socially    Drug use: No    Sexual activity: Yes     Partners: Male   Other Topics Concern    None   Social History Narrative    None     Social Determinants of Health     Financial Resource Strain: Low Risk     Difficulty of Paying Living Expenses: Not hard at all   Food Insecurity: No Food Insecurity    Worried About Running Out of Food in the Last Year: Never true    Rhonda of Food in the Last Year: Never true   Transportation Needs: No Transportation Needs    Lack of Transportation (Medical): No    Lack of Transportation (Non-Medical):  No   Physical Activity:     Days of Exercise per Week: Not on file    Minutes of Exercise per Session: Not on file   Stress:     Feeling of Stress : Not on file   Social Connections:     Frequency of Communication with Friends and Family: Not on file    Frequency of Social Gatherings with Friends and Family: Not on file    Attends [employer] Services: Not on file    Active Member of Clubs or Organizations: Not on file    Attends Club or Organization Meetings: Not on file    Marital Status: Not on file   Intimate Partner Violence:     Fear of Current or Ex-Partner: Not on file    Emotionally Abused: Not on file    Physically Abused: Not on file    Sexually Abused: Not on file   Housing Stability:     Unable to Pay for Housing in the Last Year: Not on file    Number of [place] in the Last Year: Not on file    Unstable Housing in the Last Year: Not on file      Current Outpatient Medications   Medication Sig Dispense Refill    etonogestrel-ethinyl estradiol (NUVARING) 0.12-0.015 MG/24HR vaginal ring Place 1 each vaginally every 21 days Insert one (1) ring vaginally and leave in place for three (3) weeks, then remove for one (1) week. 1 each 2     No current facility-administered medications for this visit. Objective:   Physical Exam  Vitals reviewed. Constitutional:       Appearance: She is well-developed. HENT:      Head: Normocephalic and atraumatic. Eyes:      Conjunctiva/sclera: Conjunctivae normal.   Cardiovascular:      Rate and Rhythm: Normal rate and regular rhythm. Pulmonary:      Effort: Pulmonary effort is normal.      Breath sounds: Normal breath sounds. Abdominal:      General: Bowel sounds are normal.   Musculoskeletal:         General: Normal range of motion. Cervical back: Normal range of motion and neck supple. Skin:     General: Skin is warm and dry. Neurological:      Mental Status: She is oriented to person, place, and time. Psychiatric:         Behavior: Behavior normal.         Thought Content: Thought content normal.         Judgment: Judgment normal.         Assessment:      Diagnosis Orders   1.  Irregular menses  etonogestrel-ethinyl estradiol (NUVARING) 0.12-0.015 MG/24HR vaginal ring   2. Negative pregnancy test  POCT urine pregnancy   3. Dysmenorrhea  POCT urine pregnancy    etonogestrel-ethinyl estradiol (NUVARING) 0.12-0.015 MG/24HR vaginal ring   4. History of irregular menstrual bleeding     5. Encounter for prescription for nuvaring             Plan:    Discussed with patient nuvaring risks, side effects and use and patient verbalized understanding. ACHES reviewed. When patient returns in 3 months for med ck will do pelvic exam and evaluate for cyctocele, discussed options with patient for pessary, pelvic floor therapy and patient verbalized understanding. Return in about 3 months (around 5/28/2022) for med ck. Patient was seen with total face to face time of 30 minutes. More than 50% of this visit was on counseling andeducation regarding the problems listed below and her options. She was also counseled on her preventative health maintenance recommendations and follow-up.     Electronically signed by: Michel Mauricio CNP

## 2022-05-02 ENCOUNTER — HOSPITAL ENCOUNTER (OUTPATIENT)
Facility: CLINIC | Age: 32
Discharge: HOME | End: 2022-05-04
Payer: COMMERCIAL

## 2022-05-02 ENCOUNTER — HOSPITAL ENCOUNTER (OUTPATIENT)
Dept: GENERAL RADIOLOGY | Facility: CLINIC | Age: 32
Discharge: HOME | End: 2022-05-04
Payer: COMMERCIAL

## 2022-05-02 DIAGNOSIS — M25.512 ACUTE PAIN OF LEFT SHOULDER: ICD-10-CM

## 2022-05-02 PROCEDURE — 73030 X-RAY EXAM OF SHOULDER: CPT

## 2022-06-13 ENCOUNTER — HOSPITAL ENCOUNTER (EMERGENCY)
Age: 32
Discharge: HOME | End: 2022-06-13
Attending: STUDENT IN AN ORGANIZED HEALTH CARE EDUCATION/TRAINING PROGRAM
Payer: COMMERCIAL

## 2022-06-13 ENCOUNTER — APPOINTMENT (OUTPATIENT)
Dept: CT IMAGING | Age: 32
End: 2022-06-13
Payer: COMMERCIAL

## 2022-06-13 VITALS
TEMPERATURE: 99.6 F | DIASTOLIC BLOOD PRESSURE: 73 MMHG | WEIGHT: 170 LBS | HEART RATE: 108 BPM | BODY MASS INDEX: 29.02 KG/M2 | RESPIRATION RATE: 18 BRPM | SYSTOLIC BLOOD PRESSURE: 122 MMHG | HEIGHT: 64 IN | OXYGEN SATURATION: 98 %

## 2022-06-13 DIAGNOSIS — N30.00 ACUTE CYSTITIS WITHOUT HEMATURIA: Primary | ICD-10-CM

## 2022-06-13 LAB
ABSOLUTE EOS #: 0.1 K/UL (ref 0–0.4)
ABSOLUTE LYMPH #: 1.3 K/UL (ref 1–4.8)
ABSOLUTE MONO #: 0.8 K/UL (ref 0.1–1.3)
ALBUMIN SERPL-MCNC: 4 G/DL (ref 3.5–5.2)
ALP BLD-CCNC: 200 U/L (ref 35–104)
ALT SERPL-CCNC: 73 U/L (ref 5–33)
ANION GAP SERPL CALCULATED.3IONS-SCNC: 9 MMOL/L (ref 9–17)
AST SERPL-CCNC: 46 U/L
BACTERIA: ABNORMAL
BASOPHILS # BLD: 0 % (ref 0–2)
BASOPHILS ABSOLUTE: 0 K/UL (ref 0–0.2)
BILIRUB SERPL-MCNC: 0.41 MG/DL (ref 0.3–1.2)
BILIRUBIN URINE: NEGATIVE
BUN BLDV-MCNC: 9 MG/DL (ref 6–20)
CALCIUM SERPL-MCNC: 9.5 MG/DL (ref 8.6–10.4)
CASTS UA: ABNORMAL /LPF
CHLORIDE BLD-SCNC: 102 MMOL/L (ref 98–107)
CO2: 23 MMOL/L (ref 20–31)
COLOR: YELLOW
CREAT SERPL-MCNC: 0.63 MG/DL (ref 0.5–0.9)
EOSINOPHILS RELATIVE PERCENT: 1 % (ref 0–4)
EPITHELIAL CELLS UA: ABNORMAL /HPF
GFR AFRICAN AMERICAN: >60 ML/MIN
GFR NON-AFRICAN AMERICAN: >60 ML/MIN
GFR SERPL CREATININE-BSD FRML MDRD: ABNORMAL ML/MIN/{1.73_M2}
GLUCOSE BLD-MCNC: 153 MG/DL (ref 70–99)
GLUCOSE URINE: NEGATIVE
HCG(URINE) PREGNANCY TEST: NEGATIVE
HCT VFR BLD CALC: 34.9 % (ref 36–46)
HEMOGLOBIN: 11.7 G/DL (ref 12–16)
KETONES, URINE: ABNORMAL
LEUKOCYTE ESTERASE, URINE: ABNORMAL
LYMPHOCYTES # BLD: 12 % (ref 24–44)
MCH RBC QN AUTO: 31.9 PG (ref 26–34)
MCHC RBC AUTO-ENTMCNC: 33.5 G/DL (ref 31–37)
MCV RBC AUTO: 95.2 FL (ref 80–100)
MONOCYTES # BLD: 7 % (ref 1–7)
NITRITE, URINE: POSITIVE
PDW BLD-RTO: 13 % (ref 11.5–14.9)
PH UA: 7 (ref 5–8)
PLATELET # BLD: 239 K/UL (ref 150–450)
PMV BLD AUTO: 8.8 FL (ref 6–12)
POTASSIUM SERPL-SCNC: 4.5 MMOL/L (ref 3.7–5.3)
PROTEIN UA: ABNORMAL
RBC # BLD: 3.67 M/UL (ref 4–5.2)
RBC UA: ABNORMAL /HPF
SEG NEUTROPHILS: 80 % (ref 36–66)
SEGMENTED NEUTROPHILS ABSOLUTE COUNT: 9.1 K/UL (ref 1.3–9.1)
SODIUM BLD-SCNC: 134 MMOL/L (ref 135–144)
SPECIFIC GRAVITY UA: 1.02 (ref 1–1.03)
TOTAL PROTEIN: 7.2 G/DL (ref 6.4–8.3)
TURBIDITY: ABNORMAL
URINE HGB: NEGATIVE
UROBILINOGEN, URINE: NORMAL
WBC # BLD: 11.3 K/UL (ref 3.5–11)
WBC UA: ABNORMAL /HPF

## 2022-06-13 PROCEDURE — 81025 URINE PREGNANCY TEST: CPT

## 2022-06-13 PROCEDURE — 6360000002 HC RX W HCPCS: Performed by: STUDENT IN AN ORGANIZED HEALTH CARE EDUCATION/TRAINING PROGRAM

## 2022-06-13 PROCEDURE — 85025 COMPLETE CBC W/AUTO DIFF WBC: CPT

## 2022-06-13 PROCEDURE — 87088 URINE BACTERIA CULTURE: CPT

## 2022-06-13 PROCEDURE — 87186 SC STD MICRODIL/AGAR DIL: CPT

## 2022-06-13 PROCEDURE — 99285 EMERGENCY DEPT VISIT HI MDM: CPT

## 2022-06-13 PROCEDURE — 74177 CT ABD & PELVIS W/CONTRAST: CPT

## 2022-06-13 PROCEDURE — 80053 COMPREHEN METABOLIC PANEL: CPT

## 2022-06-13 PROCEDURE — 36415 COLL VENOUS BLD VENIPUNCTURE: CPT

## 2022-06-13 PROCEDURE — 6360000004 HC RX CONTRAST MEDICATION: Performed by: STUDENT IN AN ORGANIZED HEALTH CARE EDUCATION/TRAINING PROGRAM

## 2022-06-13 PROCEDURE — 87086 URINE CULTURE/COLONY COUNT: CPT

## 2022-06-13 PROCEDURE — 96375 TX/PRO/DX INJ NEW DRUG ADDON: CPT

## 2022-06-13 PROCEDURE — 81001 URINALYSIS AUTO W/SCOPE: CPT

## 2022-06-13 PROCEDURE — 96374 THER/PROPH/DIAG INJ IV PUSH: CPT

## 2022-06-13 PROCEDURE — 2580000003 HC RX 258: Performed by: STUDENT IN AN ORGANIZED HEALTH CARE EDUCATION/TRAINING PROGRAM

## 2022-06-13 RX ORDER — SULFAMETHOXAZOLE AND TRIMETHOPRIM 800; 160 MG/1; MG/1
1 TABLET ORAL 2 TIMES DAILY
Qty: 10 TABLET | Refills: 0 | Status: SHIPPED | OUTPATIENT
Start: 2022-06-13 | End: 2022-06-18

## 2022-06-13 RX ORDER — MORPHINE SULFATE 4 MG/ML
4 INJECTION, SOLUTION INTRAMUSCULAR; INTRAVENOUS ONCE
Status: COMPLETED | OUTPATIENT
Start: 2022-06-13 | End: 2022-06-13

## 2022-06-13 RX ORDER — 0.9 % SODIUM CHLORIDE 0.9 %
80 INTRAVENOUS SOLUTION INTRAVENOUS ONCE
Status: DISCONTINUED | OUTPATIENT
Start: 2022-06-13 | End: 2022-06-13 | Stop reason: HOSPADM

## 2022-06-13 RX ORDER — SODIUM CHLORIDE 0.9 % (FLUSH) 0.9 %
10 SYRINGE (ML) INJECTION PRN
Status: DISCONTINUED | OUTPATIENT
Start: 2022-06-13 | End: 2022-06-13 | Stop reason: HOSPADM

## 2022-06-13 RX ORDER — SODIUM CHLORIDE, SODIUM LACTATE, POTASSIUM CHLORIDE, AND CALCIUM CHLORIDE .6; .31; .03; .02 G/100ML; G/100ML; G/100ML; G/100ML
1000 INJECTION, SOLUTION INTRAVENOUS ONCE
Status: COMPLETED | OUTPATIENT
Start: 2022-06-13 | End: 2022-06-13

## 2022-06-13 RX ORDER — ONDANSETRON 2 MG/ML
4 INJECTION INTRAMUSCULAR; INTRAVENOUS ONCE
Status: COMPLETED | OUTPATIENT
Start: 2022-06-13 | End: 2022-06-13

## 2022-06-13 RX ADMIN — ONDANSETRON 4 MG: 2 INJECTION INTRAMUSCULAR; INTRAVENOUS at 16:04

## 2022-06-13 RX ADMIN — IOPAMIDOL 75 ML: 755 INJECTION, SOLUTION INTRAVENOUS at 16:46

## 2022-06-13 RX ADMIN — SODIUM CHLORIDE, PRESERVATIVE FREE 10 ML: 5 INJECTION INTRAVENOUS at 16:47

## 2022-06-13 RX ADMIN — SODIUM CHLORIDE, PRESERVATIVE FREE 10 ML: 5 INJECTION INTRAVENOUS at 16:46

## 2022-06-13 RX ADMIN — MORPHINE SULFATE 4 MG: 4 INJECTION, SOLUTION INTRAMUSCULAR; INTRAVENOUS at 16:04

## 2022-06-13 RX ADMIN — SODIUM CHLORIDE, POTASSIUM CHLORIDE, SODIUM LACTATE AND CALCIUM CHLORIDE 1000 ML: 600; 310; 30; 20 INJECTION, SOLUTION INTRAVENOUS at 16:04

## 2022-06-13 ASSESSMENT — ENCOUNTER SYMPTOMS
SORE THROAT: 0
DIARRHEA: 0
COUGH: 0
EYE PAIN: 0
SINUS PRESSURE: 0
ABDOMINAL DISTENTION: 0
SINUS PAIN: 0
SHORTNESS OF BREATH: 0
CONSTIPATION: 0
VOMITING: 1
NAUSEA: 1
EYE ITCHING: 0
ABDOMINAL PAIN: 1

## 2022-06-13 ASSESSMENT — PAIN DESCRIPTION - FREQUENCY: FREQUENCY: CONTINUOUS

## 2022-06-13 ASSESSMENT — PAIN DESCRIPTION - ORIENTATION: ORIENTATION: RIGHT;LOWER

## 2022-06-13 ASSESSMENT — PAIN - FUNCTIONAL ASSESSMENT: PAIN_FUNCTIONAL_ASSESSMENT: 0-10

## 2022-06-13 ASSESSMENT — PAIN DESCRIPTION - LOCATION: LOCATION: ABDOMEN

## 2022-06-13 ASSESSMENT — PAIN SCALES - GENERAL: PAINLEVEL_OUTOF10: 8

## 2022-06-13 ASSESSMENT — LIFESTYLE VARIABLES
HOW MANY STANDARD DRINKS CONTAINING ALCOHOL DO YOU HAVE ON A TYPICAL DAY: 3 OR 4
HOW OFTEN DO YOU HAVE A DRINK CONTAINING ALCOHOL: MONTHLY OR LESS

## 2022-06-13 ASSESSMENT — PAIN DESCRIPTION - ONSET: ONSET: ON-GOING

## 2022-06-13 ASSESSMENT — PAIN DESCRIPTION - PAIN TYPE: TYPE: ACUTE PAIN

## 2022-06-13 ASSESSMENT — PAIN DESCRIPTION - DESCRIPTORS: DESCRIPTORS: SHARP

## 2022-06-13 NOTE — ED PROVIDER NOTES
[address]  Emergency Department Encounter  EmergencyMedicine Resident     Pt Name:Nita Bell  MRN: [id number]  [place] 1990  Date of evaluation: 6/13/22  PCP:  GOSIA Reeves CNP    This patient was evaluated in the Emergency Department for symptoms described in the history of present illness. The patient was evaluated in the context of the global COVID-19 pandemic, which necessitated consideration that the patient might be at risk for infection with the SARS-CoV-2 virus that causes COVID-19. Institutional protocols and algorithms that pertain to the evaluation of patients at risk for COVID-19 are in a state of rapid change based on information released by regulatory bodies including the CDC and federal and state organizations. These policies and algorithms were followed during the patient's care in the ED. CHIEF COMPLAINT       Chief Complaint   Patient presents with    Abdominal Pain     Pt reports abdominal pain for the past x2 days. Pt reports negative home covid test, x2 weeks ago.  Emesis     Pt reports x4 episodes of vomiting today. HISTORY OF PRESENT ILLNESS  (Location/Symptom, Timing/Onset, Context/Setting, Quality, Duration, Modifying Factors, Severity.)      Jennifer Croft is a 28 y.o. female who presents with right lower quadrant abdominal pain of 3 days duration with associated nausea and vomiting. Patient reports an episode of diarrhea 3 days ago which is now resolved. She denies any urinary symptoms or vaginal bleeding or discharge. Complains of subjective fevers at home. Medical history includes cholecystectomy, endometriosis. Allergies include cephalexin.     PAST MEDICAL / SURGICAL / SOCIAL / FAMILY HISTORY      has a past medical history of Abnormal Pap smear, Anemia, Arthritis, Complication of anesthesia, Deviated septum, Diabetes mellitus ([address] Endometriosis, GERD (gastroesophageal reflux disease), Hypertension, Postpartum depression, Prolonged emergence from general anesthesia, Seasonal affective disorder ([address] and Spondylisthesis. has a past surgical history that includes Dilation and curettage of uterus; Cholecystectomy; Colonoscopy; Upper gastrointestinal endoscopy; Colposcopy; hernia repair (); pr cerclage cervix w preg,vag apprch (N/A, 2018); and Cervical Cerclage (N/A, 3/17/2021). Social History     Socioeconomic History    Marital status: Single     Spouse name: Not on file    Number of children: Not on file    Years of education: Not on file    Highest education level: Not on file   Occupational History    Not on file   Tobacco Use    Smoking status: Current Every Day Smoker     Packs/day: 0.50     Types: Cigarettes     Last attempt to quit: 2020     Years since quittin.5    Smokeless tobacco: Never Used   Vaping Use    Vaping Use: Never used   Substance and Sexual Activity    Alcohol use: Not Currently     Comment: socially    Drug use: No    Sexual activity: Yes     Partners: Male   Other Topics Concern    Not on file   Social History Narrative    Not on file     Social Determinants of Health     Financial Resource Strain: Low Risk     Difficulty of Paying Living Expenses: Not hard at all   Food Insecurity: No Food Insecurity    Worried About Running Out of Food in the Last Year: Never true    Rhonda of Food in the Last Year: Never true   Transportation Needs: No Transportation Needs    Lack of Transportation (Medical): No    Lack of Transportation (Non-Medical):  No   Physical Activity:     Days of Exercise per Week: Not on file    Minutes of Exercise per Session: Not on file   Stress:     Feeling of Stress : Not on file   Social Connections:     Frequency of Communication with Friends and Family: Not on file    Frequency of Social Gatherings with Friends and Family: Not on file    Attends Zoroastrian Services: Not on file    Active Member of Clubs or Organizations: Not on file   Nirmala Medina Attends Club or Organization Meetings: Not on file    Marital Status: Not on file   Intimate Partner Violence:     Fear of Current or Ex-Partner: Not on file    Emotionally Abused: Not on file    Physically Abused: Not on file    Sexually Abused: Not on file   Housing Stability:     Unable to Pay for Housing in the Last Year: Not on file    Number of [place] in the Last Year: Not on file    Unstable Housing in the Last Year: Not on file       Family History   Problem Relation Age of Onset    Cancer Father     Diabetes Father     Stroke Father     Stroke Brother        Allergies:  Cephalexin    Home Medications:  Prior to Admission medications    Medication Sig Start Date End Date Taking? Authorizing Provider   sulfamethoxazole-trimethoprim (BACTRIM DS) 800-160 MG per tablet Take 1 tablet by mouth 2 times daily for 5 days 6/13/22 6/18/22 Yes Wen Tenorio,    tiZANidine (ZANAFLEX) 2 MG tablet TAKE 1 TABLET BY MOUTH THREE TIMES DAILY AS NEEDED FOR SHOULDER PAIN 6/1/22   Dimitri Brittle, APRN - CNP   sertraline (ZOLOFT) 50 MG tablet Take 50 mg by mouth daily  4/12/22   Historical Provider, MD   ibuprofen (ADVIL;MOTRIN) 800 MG tablet Take 1 tablet by mouth every 8 hours as needed for Pain 5/2/22   Dimitri Brittle, APRN - TERRELL   etonogestrel-ethinyl estradiol (NUVARING) 0.12-0.015 MG/24HR vaginal ring Place 1 each vaginally every 21 days Insert one (1) ring vaginally and leave in place for three (3) weeks, then remove for one (1) week. 2/28/22   GOSIA Palacio CNP       REVIEW OF SYSTEMS    (2-9 systems for level 4, 10 or more for level 5)      Review of Systems   Constitutional: Positive for fever. Negative for activity change and chills. HENT: Negative for congestion, sinus pressure, sinus pain and sore throat. Eyes: Negative for pain and itching. Respiratory: Negative for cough and shortness of breath. Cardiovascular: Negative for chest pain.    Gastrointestinal: Positive for abdominal pain, nausea and vomiting. Negative for abdominal distention, constipation and diarrhea. Endocrine: Negative for polyuria. Genitourinary: Negative for dysuria and frequency. Musculoskeletal: Negative for arthralgias. Skin: Negative for rash. Neurological: Negative for light-headedness and headaches. PHYSICAL EXAM   (up to 7 for level 4, 8 or more for level 5)      INITIAL VITALS:   /73   Pulse (!) 108   Temp 99.6 °F (37.6 °C) (Oral)   Resp 18   Ht 5' 4\" (1.626 m)   Wt 170 lb (77.1 kg)   LMP  (LMP Unknown)   SpO2 98%   BMI 29.18 kg/m²     Physical Exam  Vitals reviewed. Constitutional:       General: She is not in acute distress. HENT:      Head: Normocephalic and atraumatic. Ears:      Comments: Hearing grossly normal     Nose: Nose normal.      Mouth/Throat:      Mouth: Mucous membranes are moist.      Pharynx: Oropharynx is clear. Eyes:      General: No scleral icterus. Conjunctiva/sclera: Conjunctivae normal.      Pupils: Pupils are equal, round, and reactive to light. Cardiovascular:      Rate and Rhythm: Normal rate and regular rhythm. Pulses: Normal pulses. Pulmonary:      Effort: Pulmonary effort is normal. No respiratory distress. Breath sounds: Normal breath sounds. Abdominal:      General: There is no distension. Tenderness: There is abdominal tenderness. There is no guarding. Positive signs include McBurney's sign. Negative signs include Friend's sign. Comments: Abdomen is soft and nondistended with no guarding or peritoneal signs. She is tender to palpation in the right lower quadrant and positive McBurney sign. Musculoskeletal:      Cervical back: No muscular tenderness. Right lower leg: No edema. Left lower leg: No edema. Skin:     General: Skin is warm and dry. Capillary Refill: Capillary refill takes less than 2 seconds. Neurological:      General: No focal deficit present.       Mental Status: She is alert and 37 g/dL    RDW 13.0 11.5 - 14.9 %    Platelets 376 048 - 069 k/uL    MPV 8.8 6.0 - 12.0 fL    Seg Neutrophils 80 (H) 36 - 66 %    Lymphocytes 12 (L) 24 - 44 %    Monocytes 7 1 - 7 %    Eosinophils % 1 0 - 4 %    Basophils 0 0 - 2 %    Segs Absolute 9.10 1.3 - 9.1 k/uL    Absolute Lymph # 1.30 1.0 - 4.8 k/uL    Absolute Mono # 0.80 0.1 - 1.3 k/uL    Absolute Eos # 0.10 0.0 - 0.4 k/uL    Basophils Absolute 0.00 0.0 - 0.2 k/uL   Comprehensive Metabolic Panel   Result Value Ref Range    Glucose 153 (H) 70 - 99 mg/dL    BUN 9 6 - 20 mg/dL    CREATININE 0.63 0.50 - 0.90 mg/dL    Calcium 9.5 8.6 - 10.4 mg/dL    Sodium 134 (L) 135 - 144 mmol/L    Potassium 4.5 3.7 - 5.3 mmol/L    Chloride 102 98 - 107 mmol/L    CO2 23 20 - 31 mmol/L    Anion Gap 9 9 - 17 mmol/L    Alkaline Phosphatase 200 (H) 35 - 104 U/L    ALT 73 (H) 5 - 33 U/L    AST 46 (H) <32 U/L    Total Bilirubin 0.41 0.3 - 1.2 mg/dL    Total Protein 7.2 6.4 - 8.3 g/dL    Albumin 4.0 3.5 - 5.2 g/dL    GFR Non-African American >60 >60 mL/min    GFR African American >60 >60 mL/min    GFR Comment         Microscopic Urinalysis   Result Value Ref Range    WBC, UA TOO NUMEROUS TO COUNT /HPF    RBC, UA 0 TO 2 /HPF    Casts UA 0 TO 2 /LPF    Epithelial Cells UA 0 TO 2 /HPF    Bacteria, UA MODERATE (A) None       IMPRESSION: Mary Jo Goldsmith is a 28 y.o. woman presenting for RLQ pain w/pain over mcburney's point. She does not meet SIRS criteria and is vitally stable. Clinical suspicion for appendicitis. Also considered GYN etiology however patient denies any vaginal symptoms. Considered urinary etiology though she does deny dysuria or frequency. Will assess w/UA, metabolic panel and CBC and CT AP and offer analgesia and reassess. RADIOLOGY:  CT ABDOMEN PELVIS W IV CONTRAST Additional Contrast? None    Result Date: 6/13/2022  No acute abdominal or pelvic findings.   Normal appendix      EKG  none    All EKG's are interpreted by the Emergency Department Physician who either signs or Co-signs this chart in the absence of a cardiologist.    EMERGENCY DEPARTMENT COURSE:  Patient seen and evaluated, VSS and nontoxic in appearance. ED Course as of 06/14/22 0047   Mon Jun 13, 2022   1559 HCG(Urine) Pregnancy Test: NEGATIVE [AP]   1640 WBC(!): 11.3 [LR]   1640 Hemoglobin Quant(!): 11.7 [LR]   1640 Creatinine: 0.63 [LR]   1640 Alk Phos(!): 200 [LR]   1640 ALT(!): 73 [LR]   1640 AST(!): 46 [LR]   1640 Bilirubin: 0.41 [LR]   1641 Leukocyte Esterase, Urine(!): LARGE [LR]   1641 Nitrite, Urine(!): POSITIVE [LR]   1641 HCG(Urine) Pregnancy Test: NEGATIVE [LR]      ED Course User Index  [AP] Ahmed Prom, DO  [LR] Rajesh Jorgensen, DO   ED work-up demonstrates negative hCG, slight leukocytosis but otherwise unremarkable blood counts, normal kidney function, mild elevation of liver enzymes but normal bilirubin and large leukocyte esterase and nitrites on urinalysis. Discussed findings with patient as CT abdomen pelvis is negative for appendicitis and recommended tx for acute cystitis. She is agreeable to discharge. Allergic to keflex, will treat w/bactrim. Considered possible complicated case as her pain is not exactly suprapubic and may be starting to travel up right ureter. Educated patient about danger of progressing to kidney infection and advised her to f/u if she does not have complete symptom resolution. Patient understands to return to the emergency department for any new or worsening symptoms and to see their PCP regarding hospital follow up. No notes of  Admission Criteria type on file. PROCEDURES:  none    CONSULTS:  None    CRITICAL CARE:  See attending note    FINAL IMPRESSION      1.  Acute cystitis without hematuria          DISPOSITION / PLAN     DISPOSITION Decision To Discharge 06/13/2022 05:59:34 PM      PATIENT REFERRED TO:  GOSIA Rice CNP  [address] [address]  [phone]      As needed, If symptoms worsen    [facility]  [facility]  [phone]    As needed, If symptoms worsen      DISCHARGE MEDICATIONS:  Discharge Medication List as of 6/13/2022  5:59 PM      START taking these medications    Details   sulfamethoxazole-trimethoprim (BACTRIM DS) 800-160 MG per tablet Take 1 tablet by mouth 2 times daily for 5 days, Disp-10 tablet, R-0Print             Connor Baltazar DO  Emergency Medicine Resident    (Please note that portions of thisnote were completed with a voice recognition program.  Efforts were made to edit the dictations but occasionally words are mis-transcribed.)       Connor Baltazar DO  Resident  06/14/22 0560

## 2022-06-14 NOTE — ED PROVIDER NOTES
EMERGENCY DEPARTMENT ENCOUNTER   ATTENDING ATTESTATION     Pt Name: Steven Nascimento  MRN: [id number]  [place] 1990  Date of evaluation: 6/13/22       Steven Nascimento is a 28 y.o. female who presents with Abdominal Pain (Pt reports abdominal pain for the past x2 days. Pt reports negative home covid test, x2 weeks ago. ) and Emesis (Pt reports x4 episodes of vomiting today. )      MDM:   Right lower quadrant pain worsening since yesterday  Endorses nausea, vomiting  Negative COVID test at home  Denies any dysuria or hematuria  Pain has been gradually worsening, not sudden onset    Vitals:   Vitals:    06/13/22 1521   BP: 122/73   Pulse: (!) 108   Resp: 18   Temp: 99.6 °F (37.6 °C)   TempSrc: Oral   SpO2: 98%   Weight: 170 lb (77.1 kg)   Height: 5' 4\" (1.626 m)       PHYSICAL:   Temp: 99.6 °F (37.6 °C),  Heart Rate: (!) 108, Resp: 18, BP: 122/73, SpO2: 98 %  Gen: Non-toxic, Afebrile  Neck: Supple  Cards: Regular rate and rhythm  Pulm: Lung sounds clear to auscultation  Abdomen: Soft, nondistended, tender in the right lower quadrant. No inguinal pain. Skin: warm, dry  Extremities: pulses 2+ radial / dorsalis pedis, no clubbing, cyanosis, edema  Neurologic: CAOX3, no facial asymmetry, no dysarthria or aphasia       I personally saw and examined the patient. I have reviewed and agree with the resident's findings, including all diagnostic interpretations and treatment plan as written. I was present for the key portions of any procedures performed and the inclusive time noted for any critical care statement. There was a high probability of clinically significant/life threatening deterioration in this patient's condition which required my urgent intervention. Total critical care time was 10 minutes. This excludes any time for separately reportable procedures.      ED Course as of 06/13/22 2049 Mon Jun 13, 2022   1559 HCG(Urine) Pregnancy Test: NEGATIVE [AP]   1640 WBC(!): 11.3 [LR]   1640 Hemoglobin Quant(!): 11.7 [LR]   1640 Creatinine: 0.63 [LR]   1640 Alk Phos(!): 200 [LR]   1640 ALT(!): 73 [LR]   1640 AST(!): 46 [LR]   1640 Bilirubin: 0.41 [LR]   1641 Leukocyte Esterase, Urine(!): LARGE [LR]   1641 Nitrite, Urine(!): POSITIVE [LR]   1641 HCG(Urine) Pregnancy Test: NEGATIVE [LR]      ED Course User Index  [AP] Frankey Railing, DO  [LR] Wen Tenorio DO       The care is provided during an unprecedented national emergency due to the novel coronavirus, COVID 19.   Frankey Railing, DO  Attending Emergency Physician         Frankey Railing, DO  06/13/22 2057

## 2022-06-15 LAB
CULTURE: ABNORMAL
SPECIMEN DESCRIPTION: ABNORMAL

## 2022-07-01 DIAGNOSIS — N92.6 IRREGULAR MENSES: ICD-10-CM

## 2022-07-01 DIAGNOSIS — N94.6 DYSMENORRHEA: ICD-10-CM

## 2022-07-06 RX ORDER — ETONOGESTREL AND ETHINYL ESTRADIOL 11.7; 2.7 MG/1; MG/1
INSERT, EXTENDED RELEASE VAGINAL
Qty: 1 EACH | Refills: 2 | Status: SHIPPED | OUTPATIENT
Start: 2022-07-06 | End: 2022-08-10

## 2022-08-10 DIAGNOSIS — N94.6 DYSMENORRHEA: ICD-10-CM

## 2022-08-10 DIAGNOSIS — N92.6 IRREGULAR MENSES: ICD-10-CM

## 2022-08-10 RX ORDER — ETONOGESTREL AND ETHINYL ESTRADIOL 11.7; 2.7 MG/1; MG/1
INSERT, EXTENDED RELEASE VAGINAL
Qty: 1 EACH | Refills: 0 | Status: SHIPPED | OUTPATIENT
Start: 2022-08-10 | End: 2022-09-29

## 2022-08-10 RX ORDER — ETONOGESTREL AND ETHINYL ESTRADIOL 11.7; 2.7 MG/1; MG/1
INSERT, EXTENDED RELEASE VAGINAL
OUTPATIENT
Start: 2022-08-10

## 2022-08-15 PROBLEM — O16.3 HYPERTENSION AFFECTING PREGNANCY IN THIRD TRIMESTER: Status: RESOLVED | Noted: 2021-04-12 | Resolved: 2022-08-15

## 2022-08-15 PROBLEM — O24.414 INSULIN CONTROLLED GESTATIONAL DIABETES MELLITUS (GDM) IN THIRD TRIMESTER: Status: RESOLVED | Noted: 2021-05-26 | Resolved: 2022-08-15

## 2022-08-29 ENCOUNTER — OFFICE VISIT (OUTPATIENT)
Dept: OBGYN CLINIC | Age: 32
End: 2022-08-29
Payer: COMMERCIAL

## 2022-08-29 VITALS
HEART RATE: 86 BPM | WEIGHT: 163 LBS | SYSTOLIC BLOOD PRESSURE: 130 MMHG | DIASTOLIC BLOOD PRESSURE: 80 MMHG | BODY MASS INDEX: 27.83 KG/M2 | HEIGHT: 64 IN

## 2022-08-29 DIAGNOSIS — N81.11 CYSTOCELE, MIDLINE: Primary | ICD-10-CM

## 2022-08-29 PROCEDURE — 99213 OFFICE O/P EST LOW 20 MIN: CPT | Performed by: CLINICAL NURSE SPECIALIST

## 2022-08-29 ASSESSMENT — ENCOUNTER SYMPTOMS
ALLERGIC/IMMUNOLOGIC NEGATIVE: 1
RESPIRATORY NEGATIVE: 1
EYES NEGATIVE: 1
GASTROINTESTINAL NEGATIVE: 1

## 2022-08-29 NOTE — PROGRESS NOTES
Subjective:      Patient ID:  Ursula Quigley is a 28 y.o. female who presents for   Chief Complaint   Patient presents with    Other     Vaginal Buldge       HPI  Patient is a 27 yo female who presents for vaginal bulged which seems to be pushing out her nuvaring. Patient reports that she feels like when she sits she is sitting on something. Review of Systems   Constitutional:  Negative for chills and fever. HENT: Negative. Eyes: Negative. Respiratory: Negative. Cardiovascular: Negative. Gastrointestinal: Negative. Endocrine: Negative. Genitourinary:  Negative for dysuria, menstrual problem and vaginal discharge. Feels like she is sitting on something and she feels a bulge. Musculoskeletal: Negative. Skin: Negative. Allergic/Immunologic: Negative. Neurological: Negative. Hematological: Negative. Psychiatric/Behavioral: Negative. /80 (Site: Right Upper Arm, Position: Sitting, Cuff Size: Medium Adult)   Pulse 86   Ht 5' 4\" (1.626 m)   Wt 163 lb (73.9 kg)   LMP 08/16/2022 (Approximate)   Breastfeeding No   BMI 27.98 kg/m²    Patient's last menstrual period was 08/16/2022 (approximate). Family History   Problem Relation Age of Onset    Cancer Father     Diabetes Father     Stroke Father     Stroke Brother       Past Medical History:   Diagnosis Date    Abnormal Pap smear     approx.  2012    Anemia     not currently taking iron    Arthritis     Complication of anesthesia     low blood pressure and combative upon awaking from general anesthesia    Deviated septum     Diabetes mellitus ([address]     gestational    Endometriosis     GERD (gastroesophageal reflux disease)     Hypertension     gestational last pregnancy 2018 preeclampsia    Insulin controlled gestational diabetes mellitus (GDM) in third trimester 5/26/2021    Postpartum depression     Prolonged emergence from general anesthesia     See blow     Seasonal affective disorder ([address]     and does not take any meds    Spondylisthesis       Past Surgical History:   Procedure Laterality Date    CERVICAL CERCLAGE N/A 3/17/2021    [place] Cervical Cerclage placement performed by Marylen Coles, MD at [address] [address]    umbilical    [place] CERCLAGE CERVIX W PREG, [address] N/A 2018    CERVIX CERCLAGE performed by Marylen Coles, MD at [address] History     Socioeconomic History    Marital status: Single     Spouse name: None    Number of children: None    Years of education: None    Highest education level: None   Tobacco Use    Smoking status: Every Day     Packs/day: 0.50     Types: Cigarettes     Last attempt to quit: 2020     Years since quittin.7    Smokeless tobacco: Never   Vaping Use    Vaping Use: Never used   Substance and Sexual Activity    Alcohol use: Not Currently     Comment: socially    Drug use: No    Sexual activity: Yes     Partners: Male     Social Determinants of Health     Financial Resource Strain: Low Risk     Difficulty of Paying Living Expenses: Not hard at all   Food Insecurity: No Food Insecurity    Worried About Running Out of Food in the Last Year: Never true    Ran Out of Food in the Last Year: Never true   Transportation Needs: No Transportation Needs    Lack of Transportation (Medical): No    Lack of Transportation (Non-Medical):  No      Current Outpatient Medications   Medication Sig Dispense Refill    tiZANidine (ZANAFLEX) 2 MG tablet Take 1 tablet by mouth every 8 hours as needed (back pain) 30 tablet 1    ibuprofen (ADVIL;MOTRIN) 800 MG tablet Take 1 tablet by mouth every 8 hours as needed for Pain 60 tablet 1    etonogestrel-ethinyl estradiol (NUVARING) 0.12-0.015 MG/24HR vaginal ring INSERT ONE RING VAGINALLY AND LEAVE IN PLACE FOR 3 WEEKS, THEN REMOVE FOR 1 WEEK 1 each 0    sertraline (ZOLOFT) 50 MG tablet Take 50 mg by mouth daily       varenicline (CHANTIX) 0.5 MG tablet Take 1-2 tablets by mouth See Admin Instructions 0.5mg DAILY for 3 days followed by 0.5mg TWICE DAILY for 4 days followed by 1mg TWICE DAILY (Patient not taking: Reported on 8/29/2022) 57 tablet 0     No current facility-administered medications for this visit. Objective:   Physical Exam  Vitals reviewed. Constitutional:       Appearance: She is well-developed. HENT:      Head: Normocephalic and atraumatic. Eyes:      Conjunctiva/sclera: Conjunctivae normal.   Cardiovascular:      Rate and Rhythm: Normal rate and regular rhythm. Pulmonary:      Effort: Pulmonary effort is normal.      Breath sounds: Normal breath sounds. Genitourinary:     Comments: Cystocele noted, with slight rectocele  Musculoskeletal:         General: Normal range of motion. Cervical back: Normal range of motion and neck supple. Skin:     General: Skin is warm and dry. Neurological:      Mental Status: She is oriented to person, place, and time. Psychiatric:         Behavior: Behavior normal.         Thought Content: Thought content normal.         Judgment: Judgment normal.       Assessment:      Diagnosis Orders   1. Cystocele, midline  [facility] [place]              Plan:    Discussed with patient doing pelvic floor therapy to help strength pelvic floor muscles and possible referral for pessary and patient verbalized understanding. Can refer to Dr. Breanna Cotto for pessary evaluation    Return for as needed. Patient was seen with total face to face time of 25 minutes. More than 50% of this visit was on counseling andeducation regarding the problems listed below and her options. She was also counseled on her preventative health maintenance recommendations and follow-up.     Electronically signed by: Magdalena Tomas CNP

## 2022-09-17 DIAGNOSIS — N94.6 DYSMENORRHEA: ICD-10-CM

## 2022-09-17 DIAGNOSIS — N92.6 IRREGULAR MENSES: ICD-10-CM

## 2022-09-19 RX ORDER — ETONOGESTREL AND ETHINYL ESTRADIOL .12; .015 MG/D; MG/D
RING VAGINAL
OUTPATIENT
Start: 2022-09-19

## 2022-09-29 ENCOUNTER — OFFICE VISIT (OUTPATIENT)
Dept: OBGYN CLINIC | Age: 32
End: 2022-09-29
Payer: COMMERCIAL

## 2022-09-29 VITALS
BODY MASS INDEX: 27.83 KG/M2 | SYSTOLIC BLOOD PRESSURE: 116 MMHG | HEART RATE: 79 BPM | DIASTOLIC BLOOD PRESSURE: 78 MMHG | WEIGHT: 163 LBS | HEIGHT: 64 IN

## 2022-09-29 DIAGNOSIS — Z01.419 WELL WOMAN EXAM WITH ROUTINE GYNECOLOGICAL EXAM: Primary | ICD-10-CM

## 2022-09-29 DIAGNOSIS — Z30.016 ENCOUNTER FOR INITIAL PRESCRIPTION OF TRANSDERMAL PATCH HORMONAL CONTRACEPTIVE DEVICE: ICD-10-CM

## 2022-09-29 DIAGNOSIS — N92.6 IRREGULAR MENSES: ICD-10-CM

## 2022-09-29 DIAGNOSIS — N94.6 DYSMENORRHEA: ICD-10-CM

## 2022-09-29 PROCEDURE — 99395 PREV VISIT EST AGE 18-39: CPT | Performed by: CLINICAL NURSE SPECIALIST

## 2022-09-29 NOTE — PROGRESS NOTES
[facility]  [facility]  [address] [place] [id number]  Dept: [phone]        DATE OF VISIT:  22        History and Physical    Nita Martinez    :  1990  CHIEF COMPLAINT:    Chief Complaint   Patient presents with    Annual Exam                    Nani Campoverde is a 28 y.o. female who presents for annual well woman. Patient would like to discuss changing her birth control, she states that she sometimes has trouble with the nuvaring falling out due to her cystocele. She would like to try the patch again, she did experience location irritation after using it for multiple months but it worked the best for her. The patient was seen and examined. Per the patient bowels areregular. She has no voiding complaints. She denies any bloating as well as vaginal discharge. Chaperone for Intimate Exam  Chaperone was offered as part of the rooming process. Patient declined and agrees to continue with exam without a chaperone. Chaperone: none     _____________________________________________________________________  Past Medical History:   Diagnosis Date    Abnormal Pap smear     approx.      Anemia     not currently taking iron    Arthritis     Complication of anesthesia     low blood pressure and combative upon awaking from general anesthesia    Deviated septum     Diabetes mellitus ([address]     gestational    Endometriosis     GERD (gastroesophageal reflux disease)     Hypertension     gestational last pregnancy 2018 preeclampsia    Insulin controlled gestational diabetes mellitus (GDM) in third trimester 2021    Postpartum depression     Prolonged emergence from general anesthesia     See blow     Seasonal affective disorder ([address]     and does not take any meds    Spondylisthesis                                                                    Past Surgical History:   Procedure Laterality Date    CERVICAL CERCLAGE N/A 3/17/2021    [place] Cervical Cerclage placement performed by Alaina Yost MD at [address] [facility]  [address]    umbilical    [place] CERCLAGE CERVIX W PREG, W [place] N/A 2018    CERVIX CERCLAGE performed by Alaina Yost MD at [facility] [address]       Family History   Problem Relation Age of Onset    Cancer Father     Diabetes Father     Stroke Father     Stroke Brother      Social History     Tobacco Use   Smoking Status Every Day    Packs/day: 0.50    Types: Cigarettes    Last attempt to quit: 2020    Years since quittin.8   Smokeless Tobacco Never     Social History     Substance and Sexual Activity   Alcohol Use Not Currently    Comment: socially     Current Outpatient Medications   Medication Sig Dispense Refill    norelgestromin-ethinyl estradiol (ORTHO EVRA) 150-35 MCG/24HR Place 1 patch onto the skin once a week PLACE ONE NEW PATCH ON SKIN, ON THE SAME DAY EVERY WEEK. 9 patch 3    tiZANidine (ZANAFLEX) 2 MG tablet Take 1 tablet by mouth every 8 hours as needed (back pain) 30 tablet 1    ibuprofen (ADVIL;MOTRIN) 800 MG tablet Take 1 tablet by mouth every 8 hours as needed for Pain 60 tablet 1    varenicline (CHANTIX) 0.5 MG tablet Take 1-2 tablets by mouth See Admin Instructions 0.5mg DAILY for 3 days followed by 0.5mg TWICE DAILY for 4 days followed by 1mg TWICE DAILY 57 tablet 0    sertraline (ZOLOFT) 50 MG tablet Take 50 mg by mouth daily        No current facility-administered medications for this visit. Allergies: Allergies   Allergen Reactions    Cephalexin Anaphylaxis       Gynecologic History:  Patient's last menstrual period was 2022 (approximate).   Sexually Active: Yes  STD History:Yes; unsure of which   Birth Control: Yes    OB History    Para Term  AB Living   4 3 2 1 1 3   SAB IAB Ectopic Molar Multiple Live Births   1 0 0 0 0 3     ______________________________________________________________________    Review of Systems    REVIEW OFSYSTEMS:        Constitutional:  Unexpected weight change, extreme fatigue, night sweats              no  Skin:                           Rashes, moles   no  Neurological:  Frequentheadaches, seizures         Yes; takes ibuprofen and reports relief   Ophthalmic:  Recent visual changes no  ENT:   Difficulty swallowing  no  Breast:              Masses, pain, nipple discharge                            no     Respiratory:  Shortness of breath, coughing           Yes; current smoker     Cardiovascular: Chest pain   no     Gastrointestinal: Chronic diarrhea/constipation, nausea/vomiting           no   Urogenital:  Urinary incontinence, frequency, urgency          no                                         Heavy/irregular periods           yes; periods are regular but heavy                                      Vaginal discharge                   no  Hematological: Bruises easy  Denies clotting disorder  yes     Endocrine:  Hot flashes   no     Hot/Cold Intolerance  no    Psychological:            Mood and affect were within normal limits. Yes; on medication working well                 Physical Exam    Physical Exam:    Vitals:    09/29/22 0907   BP: 116/78   Site: Left Upper Arm   Position: Sitting   Cuff Size: Medium Adult   Pulse: 79   Weight: 163 lb (73.9 kg)   Height: 5' 4\" (1.626 m)       General Appearance: This  is a well developed, well nourished, well groomed female. Her BMI was reviewed. Nutritional decision making andexercise were discussed. Neurological:  The patient is alert and oriented to time,place, person, and situation    Skin:  A brief inspection of the skin revealed no rashes or lesions. Neck:  The neck was supple. Respiratory: There was unlabored respiratory effort. Lungs clear to ascultation. Cardiovascular:   The patients extremities were without calf tenderness or edema. Heart with a regular rate and rhythm. Abdomen: The abdomen was soft and non-tender with no guarding, rebound or rigidity. No hernias were appreciated. Breast:   The patients breasts were symmetrical.  There were no masses, discharge or retractions noted. Self breast exams were reviewed. Pelvic Exam:  The external genitalia was with a normal appearance. The vaginal vault was normal. There were no cystocele, rectocele, or enterocele appreciated. There was no vaginal discharge. The cervix was without lesions. There was no cervical motion tenderness. The uterus was mobile, midline and regular. The adnexa no fullness, tenderness or masses appreciated. ASSESSMENT: Normal annual well woman exam    28 y.o. Female; Annual   Diagnosis Orders   1. Well woman exam with routine gynecological exam        2. Irregular menses        3. Dysmenorrhea        4. Encounter for initial prescription of transdermal patch hormonal contraceptive device                      PLAN:  - Discussed new papsmear guidelines. - Birth control Discussed. - Smoking risk factors Discussed  - Diet and exercise reviewed. - Routine healthmaintenance per patients PCP.  - Return to clinic in 1 year or earlier with questions, problems, concerns. Return for 1 year for Annual and as needed.         Electronically signed by GOSIA Kang CNP on 9/29/2022 at 9:48 AM

## 2022-10-07 ENCOUNTER — APPOINTMENT (OUTPATIENT)
Dept: CT IMAGING | Age: 32
End: 2022-10-07
Payer: COMMERCIAL

## 2022-10-07 ENCOUNTER — NURSE TRIAGE (OUTPATIENT)
Dept: OTHER | Facility: CLINIC | Age: 32
End: 2022-10-07

## 2022-10-07 ENCOUNTER — HOSPITAL ENCOUNTER (EMERGENCY)
Age: 32
Discharge: HOME | End: 2022-10-07
Attending: EMERGENCY MEDICINE
Payer: COMMERCIAL

## 2022-10-07 VITALS
TEMPERATURE: 97 F | OXYGEN SATURATION: 97 % | RESPIRATION RATE: 16 BRPM | DIASTOLIC BLOOD PRESSURE: 78 MMHG | HEART RATE: 96 BPM | SYSTOLIC BLOOD PRESSURE: 115 MMHG

## 2022-10-07 DIAGNOSIS — R42 LIGHTHEADEDNESS: Primary | ICD-10-CM

## 2022-10-07 LAB
ABSOLUTE EOS #: 0.3 K/UL (ref 0–0.4)
ABSOLUTE LYMPH #: 3.3 K/UL (ref 1–4.8)
ABSOLUTE MONO #: 0.6 K/UL (ref 0.1–1.3)
ALBUMIN SERPL-MCNC: 4.7 G/DL (ref 3.5–5.2)
ALP BLD-CCNC: 118 U/L (ref 35–104)
ALT SERPL-CCNC: 48 U/L (ref 5–33)
ANION GAP SERPL CALCULATED.3IONS-SCNC: 12 MMOL/L (ref 9–17)
AST SERPL-CCNC: 23 U/L
BASOPHILS # BLD: 0 % (ref 0–2)
BASOPHILS ABSOLUTE: 0 K/UL (ref 0–0.2)
BILIRUB SERPL-MCNC: 0.2 MG/DL (ref 0.3–1.2)
BUN BLDV-MCNC: 13 MG/DL (ref 6–20)
CALCIUM SERPL-MCNC: 10.2 MG/DL (ref 8.6–10.4)
CHLORIDE BLD-SCNC: 104 MMOL/L (ref 98–107)
CO2: 22 MMOL/L (ref 20–31)
CREAT SERPL-MCNC: 0.68 MG/DL (ref 0.5–0.9)
EOSINOPHILS RELATIVE PERCENT: 2 % (ref 0–4)
GFR SERPL CREATININE-BSD FRML MDRD: >60 ML/MIN/1.73M2
GLUCOSE BLD-MCNC: 101 MG/DL (ref 65–105)
GLUCOSE BLD-MCNC: 101 MG/DL (ref 70–99)
HCG QUALITATIVE: NEGATIVE
HCT VFR BLD CALC: 38.4 % (ref 36–46)
HEMOGLOBIN: 13.7 G/DL (ref 12–16)
INFLUENZA A: NOT DETECTED
INFLUENZA B: NOT DETECTED
LIPASE: 23 U/L (ref 13–60)
LYMPHOCYTES # BLD: 28 % (ref 24–44)
MCH RBC QN AUTO: 33.3 PG (ref 26–34)
MCHC RBC AUTO-ENTMCNC: 35.6 G/DL (ref 31–37)
MCV RBC AUTO: 93.7 FL (ref 80–100)
MONOCYTES # BLD: 5 % (ref 1–7)
PDW BLD-RTO: 13.3 % (ref 11.5–14.9)
PLATELET # BLD: 252 K/UL (ref 150–450)
PMV BLD AUTO: 8.7 FL (ref 6–12)
POTASSIUM SERPL-SCNC: 4.2 MMOL/L (ref 3.7–5.3)
RBC # BLD: 4.1 M/UL (ref 4–5.2)
SARS-COV-2 RNA, RT PCR: NOT DETECTED
SEG NEUTROPHILS: 65 % (ref 36–66)
SEGMENTED NEUTROPHILS ABSOLUTE COUNT: 7.8 K/UL (ref 1.3–9.1)
SODIUM BLD-SCNC: 138 MMOL/L (ref 135–144)
SOURCE: NORMAL
SPECIMEN DESCRIPTION: NORMAL
TOTAL PROTEIN: 7.7 G/DL (ref 6.4–8.3)
WBC # BLD: 12 K/UL (ref 3.5–11)

## 2022-10-07 PROCEDURE — 99284 EMERGENCY DEPT VISIT MOD MDM: CPT

## 2022-10-07 PROCEDURE — 85025 COMPLETE CBC W/AUTO DIFF WBC: CPT

## 2022-10-07 PROCEDURE — 84703 CHORIONIC GONADOTROPIN ASSAY: CPT

## 2022-10-07 PROCEDURE — 96374 THER/PROPH/DIAG INJ IV PUSH: CPT

## 2022-10-07 PROCEDURE — 6360000002 HC RX W HCPCS: Performed by: STUDENT IN AN ORGANIZED HEALTH CARE EDUCATION/TRAINING PROGRAM

## 2022-10-07 PROCEDURE — 83690 ASSAY OF LIPASE: CPT

## 2022-10-07 PROCEDURE — 82947 ASSAY GLUCOSE BLOOD QUANT: CPT

## 2022-10-07 PROCEDURE — 80053 COMPREHEN METABOLIC PANEL: CPT

## 2022-10-07 PROCEDURE — 87636 SARSCOV2 & INF A&B AMP PRB: CPT

## 2022-10-07 PROCEDURE — 6370000000 HC RX 637 (ALT 250 FOR IP): Performed by: STUDENT IN AN ORGANIZED HEALTH CARE EDUCATION/TRAINING PROGRAM

## 2022-10-07 PROCEDURE — 36415 COLL VENOUS BLD VENIPUNCTURE: CPT

## 2022-10-07 PROCEDURE — 2580000003 HC RX 258: Performed by: STUDENT IN AN ORGANIZED HEALTH CARE EDUCATION/TRAINING PROGRAM

## 2022-10-07 PROCEDURE — 70450 CT HEAD/BRAIN W/O DYE: CPT

## 2022-10-07 PROCEDURE — 93005 ELECTROCARDIOGRAM TRACING: CPT | Performed by: STUDENT IN AN ORGANIZED HEALTH CARE EDUCATION/TRAINING PROGRAM

## 2022-10-07 RX ORDER — 0.9 % SODIUM CHLORIDE 0.9 %
1000 INTRAVENOUS SOLUTION INTRAVENOUS ONCE
Status: COMPLETED | OUTPATIENT
Start: 2022-10-07 | End: 2022-10-07

## 2022-10-07 RX ORDER — METOCLOPRAMIDE HYDROCHLORIDE 5 MG/ML
10 INJECTION INTRAMUSCULAR; INTRAVENOUS ONCE
Status: COMPLETED | OUTPATIENT
Start: 2022-10-07 | End: 2022-10-07

## 2022-10-07 RX ORDER — MECLIZINE HYDROCHLORIDE 25 MG/1
25 TABLET ORAL 3 TIMES DAILY PRN
Qty: 15 TABLET | Refills: 0 | Status: SHIPPED | OUTPATIENT
Start: 2022-10-07 | End: 2022-10-17

## 2022-10-07 RX ORDER — HYDROXYZINE HYDROCHLORIDE 25 MG/1
25 TABLET, FILM COATED ORAL ONCE
Status: DISCONTINUED | OUTPATIENT
Start: 2022-10-07 | End: 2022-10-07

## 2022-10-07 RX ORDER — MECLIZINE HYDROCHLORIDE 25 MG/1
25 TABLET ORAL ONCE
Status: COMPLETED | OUTPATIENT
Start: 2022-10-07 | End: 2022-10-07

## 2022-10-07 RX ADMIN — SODIUM CHLORIDE 1000 ML: 9 INJECTION, SOLUTION INTRAVENOUS at 16:31

## 2022-10-07 RX ADMIN — MECLIZINE HYDROCHLORIDE 25 MG: 25 TABLET ORAL at 17:10

## 2022-10-07 RX ADMIN — METOCLOPRAMIDE 10 MG: 5 INJECTION, SOLUTION INTRAMUSCULAR; INTRAVENOUS at 16:32

## 2022-10-07 ASSESSMENT — VISUAL ACUITY
OU: 20/25
OS: 20/30
OD: 20/30

## 2022-10-07 ASSESSMENT — ENCOUNTER SYMPTOMS
SHORTNESS OF BREATH: 0
ABDOMINAL DISTENTION: 0
CHEST TIGHTNESS: 0
RHINORRHEA: 0
PHOTOPHOBIA: 0
ANAL BLEEDING: 0
VOMITING: 0
CONSTIPATION: 0
SORE THROAT: 0
NAUSEA: 1
DIARRHEA: 0

## 2022-10-07 NOTE — Clinical Note
Nicol Cristina was seen and treated in our emergency department on 10/7/2022. She may return to work on 10/14/2022. If you have any questions or concerns, please don't hesitate to call.       Kathi Nicole, DO

## 2022-10-07 NOTE — DISCHARGE INSTRUCTIONS
Take the medication as indicated. Get up slowly; dangle your feet over the bed before standing up, do not stand up quickly. Sit upright. Turn your head to the symptomatic side at a 45 degree angle, and lie on your back  Remain up to 5 minutes in this position. Turn your head 90 degrees to the other side  Remain up to 5 minutes in this position. Roll your body onto your side in the direction you are facing; now you are pointing your head nose down. Remain up to 5 minutes in this position. Go back to the sitting position and remain up to 30 seconds in this position. The entire procedure should be repeated two more times, for a total of three times. Return to the Emergency Department for any other care or concern. PLEASE RETURN TO THE EMERGENCY DEPARTMENT IMMEDIATELY for worsening symptoms, worsening of sensation of room spinning, any headache, slurring of speech, change in vision / hearing / taste, ringing in your ears, loss of sensation or difficulty moving your arms or legs, excessive nausea or vomiting, or if you develop any concerning symptoms such as: high fever not relieved by acetaminophen (Tylenol) and/or ibuprofen (Motrin / Advil), chills, shortness of breath, chest pain, feeling of your heart fluttering or racing, persistent nausea and/or vomiting, vomiting up blood, blood in your stool, loss of consciousness, numbness, weakness or tingling in the arms or legs or change in color of the extremities, changes in mental status, persistent headache, blurry vision, loss of bladder / bowel control, unable to follow up with your physician, or other any other care or concern.

## 2022-10-07 NOTE — ED TRIAGE NOTES
Pt with dizziness and intermittent nausea and blurred vision since Wednesday. Pt took Dramamine with some relief yesterday. Pt reports a history of gestational diabetes. Pt denies any recent  head trauma. Pt denies pain other than a headache.

## 2022-10-07 NOTE — ED PROVIDER NOTES
[address]  Emergency Department Encounter  EmergencyMedicine Resident     Pt Name:Nita Combs  MRN: [id number]  Birthdate 1990  Date of evaluation: 10/7/22  PCP:  GOSIA Munoz Sa, CNP    CHIEF COMPLAINT       Chief Complaint   Patient presents with    Dizziness       HISTORY OF PRESENT ILLNESS  (Location/Symptom, Timing/Onset, Context/Setting, Quality, Duration, Modifying Factors, Severity.)      Brook Martinez is a 28 y.o. female who presents with headache, blurred vision, nausea and lightheadedness. Patient is unsure if she has a sensation of the room spinning she is having a hard time articulating this lightheadedness. She denies any falls or trauma. She denies any loss of strength or sensation neck pain fevers or chills. Patient states she had an episode similar to this 1 month ago that resolved on its own after 8 days. She states lightheadedness is worse when she stands up    [place] / SURGICAL / SOCIAL / FAMILY HISTORY      has a past medical history of Abnormal Pap smear, Anemia, Arthritis, Complication of anesthesia, Deviated septum, Diabetes mellitus ([address] Endometriosis, GERD (gastroesophageal reflux disease), Hypertension, Insulin controlled gestational diabetes mellitus (GDM) in third trimester, Postpartum depression, Prolonged emergence from general anesthesia, Seasonal affective disorder ([address] and Spondylisthesis. has a past surgical history that includes Dilation and curettage of uterus; Cholecystectomy; Colonoscopy; Upper gastrointestinal endoscopy; Colposcopy; hernia repair (2012); pr cerclage cervix w preg,vag apprch (N/A, 6/1/2018); and Cervical Cerclage (N/A, 3/17/2021).       Social History     Socioeconomic History    Marital status: Single     Spouse name: Not on file    Number of children: Not on file    Years of education: Not on file    Highest education level: Not on file   Occupational History    Not on file   Tobacco Use    Smoking status: Every Day     Packs/day: 0.50     Types: Cigarettes     Last attempt to quit: 2020     Years since quittin.8    Smokeless tobacco: Never   Vaping Use    Vaping Use: Never used   Substance and Sexual Activity    Alcohol use: Not Currently     Comment: socially    Drug use: No    Sexual activity: Yes     Partners: Male   Other Topics Concern    Not on file   Social History Narrative    Not on file     Social Determinants of Health     Financial Resource Strain: Low Risk     Difficulty of Paying Living Expenses: Not hard at all   Food Insecurity: No Food Insecurity    Worried About Running Out of Food in the Last Year: Never true    Ran Out of Food in the Last Year: Never true   Transportation Needs: No Transportation Needs    Lack of Transportation (Medical): No    Lack of Transportation (Non-Medical): No   Physical Activity: Not on file   Stress: Not on file   Social Connections: Not on file   Intimate Partner Violence: Not on file   Housing Stability: Not on file       Family History   Problem Relation Age of Onset    Cancer Father     Diabetes Father     Stroke Father     Stroke Brother        Allergies:  Cephalexin    Home Medications:  Prior to Admission medications    Medication Sig Start Date End Date Taking?  Authorizing Provider   meclizine (ANTIVERT) 25 MG tablet Take 1 tablet by mouth 3 times daily as needed for Dizziness 10/7/22 10/17/22 Yes Bri Jacobs,    norelgestromin-ethinyl estradiol (ORTHO EVRA) 150-35 MCG/24HR Place 1 patch onto the skin once a week PLACE ONE NEW PATCH ON SKIN, ON THE SAME DAY EVERY WEEK. 22   GOSIA Rooney CNP   tiZANidine (ZANAFLEX) 2 MG tablet Take 1 tablet by mouth every 8 hours as needed (back pain) 8/15/22   GOSIA Galloway CNP   ibuprofen (ADVIL;MOTRIN) 800 MG tablet Take 1 tablet by mouth every 8 hours as needed for Pain 8/15/22   GOSIA Galloway - CNP   varenicline (CHANTIX) 0.5 MG tablet Take 1-2 tablets by mouth See Admin Instructions 0.5mg DAILY for 3 days followed by 0.5mg TWICE DAILY for 4 days followed by 1mg TWICE DAILY 8/15/22   GOSIA Calvo CNP   sertraline (ZOLOFT) 50 MG tablet Take 50 mg by mouth daily  4/12/22   Historical Provider, MD       REVIEW OF SYSTEMS    (2-9 systems for level 4, 10 or more for level 5)      Review of Systems   Constitutional:  Negative for chills and fever. HENT:  Negative for rhinorrhea and sore throat. Eyes:  Positive for visual disturbance. Negative for photophobia. Respiratory:  Negative for chest tightness and shortness of breath. Cardiovascular:  Negative for chest pain. Gastrointestinal:  Positive for nausea. Negative for abdominal distention, anal bleeding, constipation, diarrhea and vomiting. Endocrine: Negative for polyuria. Genitourinary:  Negative for difficulty urinating and flank pain. Musculoskeletal:  Negative for arthralgias. Skin:  Negative for rash. Neurological:  Positive for light-headedness and headaches. Negative for syncope, facial asymmetry, speech difficulty, weakness and numbness. PHYSICAL EXAM   (up to 7 for level 4, 8 or more for level 5)      INITIAL VITALS:   /78   Pulse 96   Temp 97 °F (36.1 °C) (Temporal)   Resp 16   LMP 09/19/2022 (Approximate)   SpO2 97%     Physical Exam  Constitutional:       General: She is not in acute distress. Appearance: She is not ill-appearing. HENT:      Head: Normocephalic and atraumatic. Nose: Nose normal.      Mouth/Throat:      Mouth: Mucous membranes are moist.   Eyes:      Extraocular Movements: Extraocular movements intact. Pupils: Pupils are equal, round, and reactive to light. Cardiovascular:      Rate and Rhythm: Normal rate. Pulses: Normal pulses. Heart sounds: Normal heart sounds. Pulmonary:      Effort: Pulmonary effort is normal.      Breath sounds: Normal breath sounds. Abdominal:      Palpations: Abdomen is soft. Tenderness:  There is no abdominal tenderness. Musculoskeletal:      Cervical back: No tenderness. Right lower leg: No edema. Left lower leg: No edema. Skin:     Capillary Refill: Capillary refill takes less than 2 seconds. Coloration: Skin is not jaundiced. Findings: No rash. Neurological:      General: No focal deficit present. Mental Status: She is oriented to person, place, and time. Comments: Cranial nerves II through XII intact, no pronator drift upper or lower extremities bilaterally, strength and sensation intact upper and lower extremities bilaterally, negative heel-to-shin test, negative finger-nose test, no dysarthria    No truncal ataxia or dysmetria       DIFFERENTIAL  DIAGNOSIS     PLAN (LABS / IMAGING / EKG):  Orders Placed This Encounter   Procedures    COVID-19 & Influenza Combo    CT HEAD WO CONTRAST    CBC with Auto Differential    CMP    Lipase    HCG Qualitative, Serum    Visual acuity screening    Orthostatic blood pressure and pulse    POC Glucose Fingerstick    EKG 12 Lead       MEDICATIONS ORDERED:  Orders Placed This Encounter   Medications    0.9 % sodium chloride bolus    metoclopramide (REGLAN) injection 10 mg    DISCONTD: hydrOXYzine HCl (ATARAX) tablet 25 mg    meclizine (ANTIVERT) tablet 25 mg    meclizine (ANTIVERT) 25 MG tablet     Sig: Take 1 tablet by mouth 3 times daily as needed for Dizziness     Dispense:  15 tablet     Refill:  0       DIAGNOSTIC RESULTS / EMERGENCY DEPARTMENT COURSE / MDM   LAB RESULTS:  Results for orders placed or performed during the hospital encounter of 10/07/22   COVID-19 & Influenza Combo    Specimen: Nasopharyngeal Swab   Result Value Ref Range    Specimen Description . NASOPHARYNGEAL SWAB     Source . NASOPHARYNGEAL SWAB     SARS-CoV-2 RNA, RT PCR Not Detected Not Detected    INFLUENZA A Not Detected Not Detected    INFLUENZA B Not Detected Not Detected   CBC with Auto Differential   Result Value Ref Range    WBC 12.0 (H) 3.5 - 11.0 k/uL RBC 4.10 4.0 - 5.2 m/uL    Hemoglobin 13.7 12.0 - 16.0 g/dL    Hematocrit 38.4 36 - 46 %    MCV 93.7 80 - 100 fL    MCH 33.3 26 - 34 pg    MCHC 35.6 31 - 37 g/dL    RDW 13.3 11.5 - 14.9 %    Platelets 471 381 - 280 k/uL    MPV 8.7 6.0 - 12.0 fL    Seg Neutrophils 65 36 - 66 %    Lymphocytes 28 24 - 44 %    Monocytes 5 1 - 7 %    Eosinophils % 2 0 - 4 %    Basophils 0 0 - 2 %    Segs Absolute 7.80 1.3 - 9.1 k/uL    Absolute Lymph # 3.30 1.0 - 4.8 k/uL    Absolute Mono # 0.60 0.1 - 1.3 k/uL    Absolute Eos # 0.30 0.0 - 0.4 k/uL    Basophils Absolute 0.00 0.0 - 0.2 k/uL   CMP   Result Value Ref Range    Glucose 101 (H) 70 - 99 mg/dL    BUN 13 6 - 20 mg/dL    Creatinine 0.68 0.50 - 0.90 mg/dL    Est, Glom Filt Rate >60 >60 mL/min/1.73m2    Calcium 10.2 8.6 - 10.4 mg/dL    Sodium 138 135 - 144 mmol/L    Potassium 4.2 3.7 - 5.3 mmol/L    Chloride 104 98 - 107 mmol/L    CO2 22 20 - 31 mmol/L    Anion Gap 12 9 - 17 mmol/L    Alkaline Phosphatase 118 (H) 35 - 104 U/L    ALT 48 (H) 5 - 33 U/L    AST 23 <32 U/L    Total Bilirubin 0.2 (L) 0.3 - 1.2 mg/dL    Total Protein 7.7 6.4 - 8.3 g/dL    Albumin 4.7 3.5 - 5.2 g/dL   Lipase   Result Value Ref Range    Lipase 23 13 - 60 U/L   HCG Qualitative, Serum   Result Value Ref Range    hCG Qual NEGATIVE NEGATIVE   POC Glucose Fingerstick   Result Value Ref Range    POC Glucose 101 65 - 105 mg/dL   EKG 12 Lead   Result Value Ref Range    Ventricular Rate 72 BPM    Atrial Rate 72 BPM    P-R Interval 144 ms    QRS Duration 94 ms    Q-T Interval 376 ms    QTc Calculation (Bazett) 411 ms    P Axis 61 degrees    R Axis 53 degrees    T Axis 53 degrees       RADIOLOGY:  CT HEAD WO CONTRAST    Result Date: 10/7/2022  No acute intracranial abnormality. EKG Interpretation  none    EMERGENCY DEPARTMENT COURSE:  ED Course as of 10/07/22 2240   Fri Oct 07, 2022   1531 Patient value bedside. Having some lightheadedness nausea blurry vision and headache.   States she had this about a month ago and went away after a days. No falls or trauma. Neurologically intact. Will get labs will get EKG [ZE]   1703 Labs and imaging unremarkable. Will try Atarax [ZE]   2138 Patient reeval bedside. Feels better. Will discharge [ZE]      ED Course User Index  [ZE] Fer Johnson DO       PROCEDURES:  Procedures     CONSULTS:  None    CRITICAL CARE:  none    MDM  Here for headache accompanied with nonspecific symptoms. Patient nontoxic VSS no nystagmus dysmetria or ataxia. CT head negative labs unremarkable. Symptoms could be related to BPPV versus orthostatic hypotension. Patient had symptom relief with meclizine. Patient discharged home with fall precautions return precautions    FINAL IMPRESSION      1.  Lightheadedness          DISPOSITION / PLAN     DISPOSITION Decision To Discharge 10/07/2022 06:50:41 PM      PATIENT REFERRED TO:  GOSIA Conner - CNP  [address] [address]  [phone]    In 1 day      [facility]  [facility] [address]  [address] [facility]  [phone]    If symptoms worsen    DISCHARGE MEDICATIONS:  Discharge Medication List as of 10/7/2022  6:51 PM        START taking these medications    Details   meclizine (ANTIVERT) 25 MG tablet Take 1 tablet by mouth 3 times daily as needed for Dizziness, Disp-15 tablet, R-0Print             Vidal Nolan DO  Emergency Medicine Resident    (Please note that portions of thisnote were completed with a voice recognition program.  Efforts were made to edit the dictations but occasionally words are mis-transcribed.)        eFr Johnson DO  Resident  10/07/22 7491

## 2022-10-07 NOTE — ED PROVIDER NOTES
[address]     Pt Name: Karla Moe  MRN: [id number]  [place] 1990  Date of evaluation: 10/7/22       Karla Moe is a 28 y.o. female who presents with Dizziness      MDM:   Headache and some dizziness  Has daily headaches, usually resolve with tylenol and motrin  Today it was not  Dizziness worse when she moves her head    GCS 15  Strength in arms 5/5  Strength in legs 5/5  Sensation intact bl arms and legs  No facial droop  Speech is clear, no dysarthria or aphasia  No ataxia in arms or legs  No gaze preference or nystagums  Visual fields intact  No neck stiffness  No photophobia    Do not suspect CNS infection bleed or thrombosis    Vitals:   Vitals:    10/07/22 1541 10/07/22 1544 10/07/22 1545 10/07/22 1634   BP: 127/81 (!) 127/94 (!) 117/90 121/72   Pulse:       Resp:       Temp:       TempSrc:       SpO2:             I personally saw and examined the patient. I have reviewed and agree with the resident's findings, including all diagnostic interpretations and treatment plan as written. I was present for the key portions of any procedures performed and the inclusive time noted for any critical care statement. The care is provided during an unprecedented national emergency due to the novel coronavirus, COVID 19.   Jose Salinas MD  Attending Emergency Physician            Jose Salinas MD  10/07/22 8966

## 2022-10-07 NOTE — TELEPHONE ENCOUNTER
[facility] [employer]. Subjective: Caller states \"dizziness\"     Current Symptoms: see above, after dizziness starts, she gets nausea, pressure in ears, and a weird sound like buzzing or ringing. Feels like pressure in ears. Denies HTN, DM. Headache    Onset: 2 days ago; sudden has had this before and was seen and told it may be dehydration    Associated Symptoms: reduced activity, diarrhea yesterday and day before, has ibs    Pain Severity: 3/10; aching; mild    Temperature: denies fever     What has been tried: dramamine, electrolyte drinks    LMP:  2 weeks ago  Pregnant: No    Recommended disposition: Go to ED Now    Care advice provided, patient verbalizes understanding; denies any other questions or concerns; instructed to call back for any new or worsening symptoms. Patient/caller agrees to proceed to nearby Emergency Department    Attention Provider: Thank you for allowing me to participate in the care of your patient. The patient was connected to triage in response to information provided to the ECC/PSC. Please do not respond through this encounter as the response is not directed to a shared pool.     Reason for Disposition   Loss of vision or double vision (Exception: similar to previous migraines)    Protocols used: Dizziness - Vertigo-ADULT-

## 2022-10-10 LAB
EKG ATRIAL RATE: 72 BPM
EKG P AXIS: 61 DEGREES
EKG P-R INTERVAL: 144 MS
EKG Q-T INTERVAL: 376 MS
EKG QRS DURATION: 94 MS
EKG QTC CALCULATION (BAZETT): 411 MS
EKG R AXIS: 53 DEGREES
EKG T AXIS: 53 DEGREES
EKG VENTRICULAR RATE: 72 BPM

## 2023-01-09 ENCOUNTER — HOSPITAL ENCOUNTER (EMERGENCY)
Age: 33
Discharge: HOME | End: 2023-01-09
Attending: EMERGENCY MEDICINE
Payer: COMMERCIAL

## 2023-01-09 VITALS
RESPIRATION RATE: 15 BRPM | SYSTOLIC BLOOD PRESSURE: 122 MMHG | DIASTOLIC BLOOD PRESSURE: 72 MMHG | BODY MASS INDEX: 27.31 KG/M2 | WEIGHT: 160 LBS | TEMPERATURE: 98.3 F | HEIGHT: 64 IN | OXYGEN SATURATION: 99 % | HEART RATE: 97 BPM

## 2023-01-09 DIAGNOSIS — J02.0 STREPTOCOCCAL SORE THROAT: Primary | ICD-10-CM

## 2023-01-09 LAB
INFLUENZA A: NOT DETECTED
INFLUENZA B: NOT DETECTED
S PYO AG THROAT QL: POSITIVE
SARS-COV-2 RNA, RT PCR: NOT DETECTED
SOURCE: ABNORMAL
SOURCE: NORMAL
SPECIMEN DESCRIPTION: NORMAL

## 2023-01-09 PROCEDURE — 87636 SARSCOV2 & INF A&B AMP PRB: CPT

## 2023-01-09 PROCEDURE — 87880 STREP A ASSAY W/OPTIC: CPT

## 2023-01-09 PROCEDURE — 6370000000 HC RX 637 (ALT 250 FOR IP): Performed by: PHYSICIAN ASSISTANT

## 2023-01-09 PROCEDURE — 99283 EMERGENCY DEPT VISIT LOW MDM: CPT

## 2023-01-09 RX ORDER — AZITHROMYCIN 250 MG/1
TABLET, FILM COATED ORAL
Qty: 4 TABLET | Refills: 0 | Status: SHIPPED | OUTPATIENT
Start: 2023-01-10

## 2023-01-09 RX ORDER — AZITHROMYCIN 250 MG/1
500 TABLET, FILM COATED ORAL ONCE
Status: COMPLETED | OUTPATIENT
Start: 2023-01-09 | End: 2023-01-09

## 2023-01-09 RX ORDER — ACETAMINOPHEN 500 MG
1000 TABLET ORAL ONCE
Status: COMPLETED | OUTPATIENT
Start: 2023-01-09 | End: 2023-01-09

## 2023-01-09 RX ADMIN — ACETAMINOPHEN 1000 MG: 500 TABLET ORAL at 17:41

## 2023-01-09 RX ADMIN — AZITHROMYCIN MONOHYDRATE 500 MG: 250 TABLET ORAL at 17:41

## 2023-01-09 ASSESSMENT — LIFESTYLE VARIABLES
HOW OFTEN DO YOU HAVE A DRINK CONTAINING ALCOHOL: MONTHLY OR LESS
HOW MANY STANDARD DRINKS CONTAINING ALCOHOL DO YOU HAVE ON A TYPICAL DAY: 3 OR 4

## 2023-01-09 ASSESSMENT — PAIN - FUNCTIONAL ASSESSMENT: PAIN_FUNCTIONAL_ASSESSMENT: 0-10

## 2023-01-09 ASSESSMENT — PAIN SCALES - GENERAL: PAINLEVEL_OUTOF10: 7

## 2023-01-09 NOTE — Clinical Note
Juani Cedeno was seen and treated in our emergency department on 1/9/2023. She may return to work on 01/10/2023. Patient may return to work after 6pm on 1/10/2023 as she should no longer be contagious at that time. If you have any questions or concerns, please don't hesitate to call.       CHINA Gross

## 2023-01-09 NOTE — ED PROVIDER NOTES
EMERGENCY DEPARTMENT ENCOUNTER    Pt Name: Toshia Leo  MRN: [id number]  [place] 1990  Date of evaluation: 1/9/23  CHIEF COMPLAINT       Chief Complaint   Patient presents with    Pharyngitis    Generalized Body Aches     Pt here with sore throat, headache, body aches since Saturday, noticed redness and swelling to tonsils      HISTORY OF PRESENT ILLNESS   HPI  Patient presents with sore throat x2 days. It got a lot worse last night, hurts to even swallow her spit now, it is like swallowing glass. She took a dose of Motrin earlier which helped a little bit. She states she has had strep throat multiple times and that this is what it feels like. She denies fever, rhinorrhea, cough. Denies nausea or vomiting. She states she typically gets a shot of penicillin for strep as the oral preparations upset her stomach. She has a history of a severe Keflex allergy, however she has no penicillin allergy. PASTMEDICAL HISTORY     Past Medical History:   Diagnosis Date    Abnormal Pap smear     approx.  2012    Anemia     not currently taking iron    Arthritis     Complication of anesthesia     low blood pressure and combative upon awaking from general anesthesia    Deviated septum     Diabetes mellitus ([address]     gestational    Endometriosis     GERD (gastroesophageal reflux disease)     Hypertension     gestational last pregnancy 2018 preeclampsia    Insulin controlled gestational diabetes mellitus (GDM) in third trimester 5/26/2021    Postpartum depression     Prolonged emergence from general anesthesia     See blow     Seasonal affective disorder ([address]     and does not take any meds    Spondylisthesis      Patient Active Problem List   Diagnosis Code    H/O PTD (G2 @ 28w) O09.212    Short cervical length during pregnancy in second trimester O26.872    S/P Milind Cervical cerclage placed 6/1/18, removed 9/13 O34.30    Noncompliance Z91.199    Poor historian Z78.9    History of cervical cerclage, currently pregnant, second trimester O09.292, Z98.890    History of gestational hypertension Z87.59    Bilateral lower extremity edema R60.0    Incompetent cervix N88.3    Vaz Cerclage placed 3/17/21 O34.30    Need for Tdap vaccination Z23     21 M Apg 8/9 Wt 8#14 O80     SURGICAL HISTORY       Past Surgical History:   Procedure Laterality Date    CERVICAL CERCLAGE N/A 3/17/2021    [place] Cervical Cerclage placement performed by Cristian Miranda MD at [address] [address]    umbilical    [place] CERCLAGE CERVIX PREGNANCY VAGINAL N/A 2018    CERVIX CERCLAGE performed by Cristian Miranda MD at [address]       No current facility-administered medications on file prior to encounter. Current Outpatient Medications on File Prior to Encounter   Medication Sig Dispense Refill    tiZANidine (ZANAFLEX) 2 MG tablet TAKE 1 TABLET BY MOUTH EVERY 8 HOURS AS NEEDED FOR BACK PAIN 30 tablet 1    norelgestromin-ethinyl estradiol (ORTHO EVRA) 150-35 MCG/24HR Place 1 patch onto the skin once a week PLACE ONE NEW PATCH ON SKIN, ON THE SAME DAY EVERY WEEK. 9 patch 3    ibuprofen (ADVIL;MOTRIN) 800 MG tablet Take 1 tablet by mouth every 8 hours as needed for Pain 60 tablet 1    varenicline (CHANTIX) 0.5 MG tablet Take 1-2 tablets by mouth See Admin Instructions 0.5mg DAILY for 3 days followed by 0.5mg TWICE DAILY for 4 days followed by 1mg TWICE DAILY 57 tablet 0    sertraline (ZOLOFT) 50 MG tablet Take 50 mg by mouth daily        ALLERGIES     is allergic to cephalexin. FAMILY HISTORY     She indicated that the status of her father is unknown. She indicated that the status of her brother is unknown.      SOCIAL HISTORY       Social History     Tobacco Use    Smoking status: Every Day     Packs/day: 0.50     Types: Cigarettes     Last attempt to quit: 2020 Years since quittin.1    Smokeless tobacco: Never   Vaping Use    Vaping Use: Never used   Substance Use Topics    Alcohol use: Not Currently     Comment: socially    Drug use: No     PHYSICAL EXAM       ED Triage Vitals [23 1624]   BP Temp Temp Source Heart Rate Resp SpO2 Height Weight   122/72 98.3 °F (36.8 °C) Oral 97 15 99 % 5' 4\" (1.626 m) 160 lb (72.6 kg)     Nursing note and vitals reviewed  General: Patient is alert and oriented, no acute distress, well-developed, well-nourished   HEENT: Normocephalic and atraumatic, external ears normal, nose normal and midline, conjunctivae normal, vision grossly intact, gaze appropriately aligned, oropharynx clear, mucous membranes moist, tonsils 2+ and injected bilaterally, uvula midline  Neck: Normal range of motion, neck supple, tender anterior cervical adenopathy  Heart: RRR  Lungs: Respirations even and symmetric  Musculoskeletal: No swelling or deformity noted  Neurological: Normal strength and tone, no focal deficits noted  Skin: Skin is warm and dry, no rash noted  Psychiatric: Normal mood and affect, cooperative, appropriate    MEDICAL DECISION MAKING AND ED COURSE:   1)  Number and Complexity of Problems  Problem List This Visit: Acute pharyngitis  Differential Diagnosis: Strep, COVID, flu  Diagnoses Considered but Do Not Suspect: antwon-tonsillar abscess  Pertinent Comorbid Conditions: 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 While she reports that she has tolerated penicillin in the past, the pharmacist and I cannot find any record of a prior exposure within the [facility] this time. I advised that we treat with Zithromax at this time, which patient is amenable to.   She can treat her pain with Tylenol and Motrin, may return to work 24 hours after first dose of antibiotics. Anticipatory guidance provided regarding reasons to return to the ED. Patient verbalized understanding of the plan and all questions were addressed. PROCEDURES:    Procedures  DATA FOR LAB AND RADIOLOGY TESTS ORDERED BELOW ARE REVIEWED BY THE ED CLINICIAN:    LABS: Lab orders shown below, the results are reviewed by myself, and all abnormals are listed below. Labs Reviewed   STREP SCREEN GROUP A THROAT - Abnormal; Notable for the following components:       Result Value    Strep A Ag POSITIVE (*)     All other components within normal limits   COVID-19 & INFLUENZA COMBO       Vitals Reviewed:    Vitals:    01/09/23 1624   BP: 122/72   Pulse: 97   Resp: 15   Temp: 98.3 °F (36.8 °C)   TempSrc: Oral   SpO2: 99%   Weight: 160 lb (72.6 kg)   Height: 5' 4\" (1.626 m)     MEDICATIONS GIVEN TO PATIENT THIS ENCOUNTER:  Orders Placed This Encounter   Medications    acetaminophen (TYLENOL) tablet 1,000 mg    azithromycin (ZITHROMAX) tablet 500 mg     Order Specific Question:   Antimicrobial Indications     Answer:   Head and Neck Infection    azithromycin (ZITHROMAX) 250 MG tablet     Sig: Take 1 tablet by mouth once daily     Dispense:  4 tablet     Refill:  0     DISCHARGE PRESCRIPTIONS:  Discharge Medication List as of 1/9/2023  5:56 PM        START taking these medications    Details   azithromycin (ZITHROMAX) 250 MG tablet Take 1 tablet by mouth once daily, Disp-4 tablet, R-0Normal           PHYSICIAN CONSULTS ORDERED THIS ENCOUNTER:  None  FINAL IMPRESSION      1. Streptococcal sore throat        DISPOSITION/PLAN   DISPOSITION Decision To Discharge 01/09/2023 05:56:01 PM      PATIENT REFERRED TO:  Diane Hill, APRN - CNP  [address] [address]  [phone]    Call       The care is provided during an unprecedented national emergency due to the novel coronavirus, COVID 19.   [address] [facility] Joanna MAY [address]  01/10/23 3213

## 2023-01-10 NOTE — ED PROVIDER NOTES
[address] ED  eMERGENCY dEPARTMENT eNCOUnter   Independent Attestation     Pt Name: Janeth Diaz  MRN: [id number]  [place] 1990  Date of evaluation: 1/9/23   Janeth Diaz is a 28 y.o. female who presents with Pharyngitis and Generalized Body Aches (Pt here with sore throat, headache, body aches since Saturday, noticed redness and swelling to tonsils )    Vitals:   Vitals:    01/09/23 1624   BP: 122/72   Pulse: 97   Resp: 15   Temp: 98.3 °F (36.8 °C)   TempSrc: Oral   SpO2: 99%   Weight: 160 lb (72.6 kg)   Height: 5' 4\" (1.626 m)     Impression:   1. Streptococcal sore throat      I was personally available for consultation in the Emergency Department. I have reviewed the chart and agree with the documentation as recorded by the [facility], including the assessment, treatment plan and disposition.   Mikhail High MD  Attending Emergency  Physician                  Mikhail High MD  01/09/23 8781       Mikhail High MD  01/09/23 6926

## 2023-05-21 ENCOUNTER — HOSPITAL ENCOUNTER (EMERGENCY)
Age: 33
Discharge: HOME | End: 2023-05-21
Attending: EMERGENCY MEDICINE
Payer: COMMERCIAL

## 2023-05-21 ENCOUNTER — APPOINTMENT (OUTPATIENT)
Dept: GENERAL RADIOLOGY | Age: 33
End: 2023-05-21
Payer: COMMERCIAL

## 2023-05-21 VITALS
WEIGHT: 165 LBS | DIASTOLIC BLOOD PRESSURE: 85 MMHG | HEART RATE: 99 BPM | HEIGHT: 64 IN | OXYGEN SATURATION: 98 % | RESPIRATION RATE: 16 BRPM | BODY MASS INDEX: 28.17 KG/M2 | TEMPERATURE: 98.1 F | SYSTOLIC BLOOD PRESSURE: 127 MMHG

## 2023-05-21 DIAGNOSIS — S60.221A CONTUSION OF RIGHT HAND, INITIAL ENCOUNTER: Primary | ICD-10-CM

## 2023-05-21 PROCEDURE — 6360000002 HC RX W HCPCS: Performed by: EMERGENCY MEDICINE

## 2023-05-21 PROCEDURE — 90471 IMMUNIZATION ADMIN: CPT | Performed by: EMERGENCY MEDICINE

## 2023-05-21 PROCEDURE — 6370000000 HC RX 637 (ALT 250 FOR IP): Performed by: EMERGENCY MEDICINE

## 2023-05-21 PROCEDURE — 99284 EMERGENCY DEPT VISIT MOD MDM: CPT

## 2023-05-21 PROCEDURE — 90715 TDAP VACCINE 7 YRS/> IM: CPT | Performed by: EMERGENCY MEDICINE

## 2023-05-21 PROCEDURE — 73130 X-RAY EXAM OF HAND: CPT

## 2023-05-21 RX ORDER — ACETAMINOPHEN 325 MG/1
650 TABLET ORAL ONCE
Status: COMPLETED | OUTPATIENT
Start: 2023-05-21 | End: 2023-05-21

## 2023-05-21 RX ORDER — IBUPROFEN 600 MG/1
600 TABLET ORAL ONCE
Status: COMPLETED | OUTPATIENT
Start: 2023-05-21 | End: 2023-05-21

## 2023-05-21 RX ADMIN — ACETAMINOPHEN 650 MG: 325 TABLET ORAL at 19:42

## 2023-05-21 RX ADMIN — IBUPROFEN 600 MG: 600 TABLET, FILM COATED ORAL at 19:08

## 2023-05-21 RX ADMIN — TETANUS TOXOID, REDUCED DIPHTHERIA TOXOID AND ACELLULAR PERTUSSIS VACCINE, ADSORBED 0.5 ML: 5; 2.5; 8; 8; 2.5 SUSPENSION INTRAMUSCULAR at 19:11

## 2023-05-21 ASSESSMENT — PAIN DESCRIPTION - LOCATION
LOCATION: HAND
LOCATION: HAND
LOCATION: WRIST;HAND

## 2023-05-21 ASSESSMENT — PAIN DESCRIPTION - ORIENTATION
ORIENTATION: RIGHT

## 2023-05-21 ASSESSMENT — PAIN SCALES - GENERAL
PAINLEVEL_OUTOF10: 8
PAINLEVEL_OUTOF10: 7
PAINLEVEL_OUTOF10: 9

## 2023-05-21 ASSESSMENT — PAIN DESCRIPTION - DESCRIPTORS
DESCRIPTORS: ACHING;THROBBING;SHOOTING
DESCRIPTORS: SHOOTING

## 2023-05-21 ASSESSMENT — PAIN - FUNCTIONAL ASSESSMENT: PAIN_FUNCTIONAL_ASSESSMENT: 0-10

## 2023-05-21 ASSESSMENT — PAIN DESCRIPTION - PAIN TYPE: TYPE: ACUTE PAIN

## 2023-05-22 NOTE — ED PROVIDER NOTES
[address] ED  EMERGENCY DEPARTMENT ENCOUNTER      Pt Name: Ventura Florence  MRN: [id number]  [place] 1990  Date of evaluation: 5/21/23      CHIEF COMPLAINT       Chief Complaint   Patient presents with    Hand Injury     right    Abrasion     Left great toe     HISTORY OF PRESENT ILLNESS   HPI 35 y.o. female presents with c/o: Hand pain. Patient reports that she [place] off a ladder onto her right hand. Injury happened just prior to presentation. She is having a lot of pain and swelling around her thumb. Patient also sustained an abrasion to her left great toe. Denies any head injury. Denies any neck or back pain. No chest or abdominal pain. Unknown last tetanus    REVIEW OF SYSTEMS       Review of Systems   Musculoskeletal:  Positive for arthralgias. Skin:  Positive for wound. PAST MEDICAL HISTORY     Past Medical History:   Diagnosis Date    Abnormal Pap smear     approx.  2012    Anemia     not currently taking iron    Arthritis     Complication of anesthesia     low blood pressure and combative upon awaking from general anesthesia    Deviated septum     Diabetes mellitus ([address]     gestational    Endometriosis     GERD (gastroesophageal reflux disease)     Hypertension     gestational last pregnancy 2018 preeclampsia    Insulin controlled gestational diabetes mellitus (GDM) in third trimester 5/26/2021    Postpartum depression     Prolonged emergence from general anesthesia     See blow     Seasonal affective disorder ([address]     and does not take any meds    Spondylisthesis        SURGICAL HISTORY       Past Surgical History:   Procedure Laterality Date    CERVICAL CERCLAGE N/A 3/17/2021    [place] Cervical Cerclage placement performed by Arash Domingo MD at [address] [place] [address]    umbilical    [place] CERCLAGE CERVIX PREGNANCY VAGINAL N/A 6/1/2018    CERVIX CERCLAGE performed by Elissa Sosa

## 2023-06-01 ENCOUNTER — HOSPITAL ENCOUNTER (OUTPATIENT)
Dept: GENERAL RADIOLOGY | Facility: CLINIC | Age: 33
Discharge: HOME | End: 2023-06-03
Payer: COMMERCIAL

## 2023-06-01 ENCOUNTER — HOSPITAL ENCOUNTER (OUTPATIENT)
Facility: CLINIC | Age: 33
Discharge: HOME | End: 2023-06-03
Payer: COMMERCIAL

## 2023-06-01 DIAGNOSIS — S63.501A SPRAIN OF RIGHT WRIST, INITIAL ENCOUNTER: ICD-10-CM

## 2023-06-01 PROCEDURE — 73110 X-RAY EXAM OF WRIST: CPT

## 2023-08-21 NOTE — PROGRESS NOTES
Patient was given 17P in the Right Gluteus Tre. NDC# N/A  LOT# [id number]  Exp date- 06/30/2021  Patient's last injection was 05/18/2021. Patient tolerated well without difficulty. [No Acute Distress] : no acute distress [Normal Rate/Rhythm] : normal rate/rhythm [Normal S1, S2] : normal s1, s2 [No Murmurs] : no murmurs [No Resp Distress] : no resp distress [Clear to Auscultation Bilaterally] : clear to auscultation bilaterally [Normal Affect] : normal affect

## 2024-08-01 ENCOUNTER — OFFICE VISIT (OUTPATIENT)
Dept: ORTHOPEDIC SURGERY | Age: 34
End: 2024-08-01
Payer: COMMERCIAL

## 2024-08-01 VITALS — BODY MASS INDEX: 29.02 KG/M2 | HEIGHT: 64 IN | WEIGHT: 170 LBS

## 2024-08-01 DIAGNOSIS — M75.82 TENDONITIS OF LEFT ROTATOR CUFF: Primary | ICD-10-CM

## 2024-08-01 DIAGNOSIS — M25.512 LEFT SHOULDER PAIN, UNSPECIFIED CHRONICITY: ICD-10-CM

## 2024-08-01 DIAGNOSIS — M25.512 TRIGGER POINT OF LEFT SHOULDER REGION: ICD-10-CM

## 2024-08-01 PROCEDURE — 99203 OFFICE O/P NEW LOW 30 MIN: CPT

## 2024-08-01 RX ORDER — DICLOFENAC SODIUM 75 MG/1
75 TABLET, DELAYED RELEASE ORAL 2 TIMES DAILY WITH MEALS
Qty: 28 TABLET | Refills: 0 | Status: SHIPPED | OUTPATIENT
Start: 2024-08-01 | End: 2024-08-15

## 2024-08-01 NOTE — TELEPHONE ENCOUNTER
Voltaren pended for 8/1/24 office visit for left rotator cuff tendonitis and left shoulder trigger point.

## 2024-08-03 NOTE — PROGRESS NOTES
normal radiographs of the left shoulder as outlined above.      Impression/Plan:     Nita Martinez is a 34 y.o. old female who presented clinic today for evaluation of left shoulder pain.  After evaluation discussion with the patient I do believe she is likely dealing with a rotator cuff tendinitis to the left shoulder.  We did discuss these etiologies as well as other possible etiologies for her left shoulder pain in office today.  We did discuss conservative and surgical management at this time.  I do recommend we start with conservative management by way of a short course of a different oral anti-inflammatory being Voltaren oral tablets 2 times a day for 2 weeks.  In addition I do recommend we start her in formal physical therapy working on scapular stabilizers and rotator cuff strengthening.  Patient should give this about 6 weeks to take effect to see how much pain relief and increase in function she is able to gain.  Ultimately if she is not seeing adequate relief I would like to see her back and would consider at that time possibly an injection into the shoulder or MRI of the left shoulder.  A prescription for formal therapy was provided the patient office today and Voltaren was sent over to her pharmacy.  Patient is amenable to plan as outlined above and I will see her back as needed but she was encouraged to contact the office any question concerns she may have.    This note is created with the assistance of a speech recognition program.  While intending to generate adocument that actually reflects the content of the visit, the document can still have some errors including those of syntax and sound a like substitutions which may escape proof reading.  It such instances, actual meaningcan be extrapolated by contextual diversion.    NA = Not assessed  RTC = Rotator cuff  RCT = Rotator cuff tear  ER = External rotation  IR = Internal rotation  AC = Acromioclavicular  GH = Glenohumeral  n = No  y = Yes

## 2024-08-12 ENCOUNTER — HOSPITAL ENCOUNTER (OUTPATIENT)
Dept: PHYSICAL THERAPY | Age: 34
Setting detail: THERAPIES SERIES
Discharge: HOME | End: 2024-08-12
Payer: COMMERCIAL

## 2024-08-12 PROCEDURE — 97110 THERAPEUTIC EXERCISES: CPT

## 2024-08-12 PROCEDURE — 97161 PT EVAL LOW COMPLEX 20 MIN: CPT

## 2024-08-12 NOTE — CONSULTS
[] [facility]  Outpatient Rehabilitation &  Therapy  [address]   [address]  P: [phone]  F: [phone] [x] [facility]  Outpatient Rehabilitation &  Therapy  [address]   [address]  P: [phone]  F: [phone]       Physical Therapy Upper Extremity Evaluation    Date:  2024  Patient: Nita Martinez  : 1990  MRN: [id number]  Physician: Pipe Bee PA-C     Insurance: [employer] (60 Vs, HARD MAX, combo PT/OT/ST)  Medical Diagnosis:   M75.82 (ICD-10-CM) - Tendonitis of left rotator cuff   M25.512 (ICD-10-CM) - Trigger point of left shoulder region   Rehab Codes: M75.82, M25.512, M62.81, M54.2  Onset date: 2024 referral    Next Dr's appt.: 2024    Subjective:   CC: L shoulder pain, impaired mobility and weakness with increased difficulty with use for ADLs and work  HPI: Pt reports history of L shoulder pain. She reports a previous injury at work where she was holding a patient by a gait belt and she moved quickly, moving her L shoulder in horizontal adduction. She reports that she fell on some snow approximately 2 years ago and she reports her L side slid into a firehydrant. Pt reports that symptoms have since increased with difficulty with overhead mobility, popping and burning.   Pt locates pain posteriorly around the supraspinatus and she reports that it wraps around to her lateral arm. 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 Pt reports instances in L hand tingling with holding objects. Pt reports that while trying to mow the grass over the weekend she had to do it only R handed secondary to tingling in her L hand and tiredness. Pt reports sharp pains with elevation activities and burning with leaving her arm in a neutral position. She arrives holding her arm at her side in order to improve pain. She notes that she is starting to notice increased symptoms on

## 2024-08-14 ENCOUNTER — HOSPITAL ENCOUNTER (OUTPATIENT)
Dept: PHYSICAL THERAPY | Age: 34
Setting detail: THERAPIES SERIES
Discharge: HOME | End: 2024-08-14
Payer: COMMERCIAL

## 2024-08-14 PROCEDURE — 97110 THERAPEUTIC EXERCISES: CPT

## 2024-08-14 PROCEDURE — 97140 MANUAL THERAPY 1/> REGIONS: CPT

## 2024-08-14 NOTE — FLOWSHEET NOTE
[facility]   Outpatient Rehabilitation & Therapy  [address]  P: [phone]   F: [phone]    Physical Therapy Daily Treatment Note      Date:  2024  Patient Name:  Nita Martinez    :  1990  MRN: [id number]  Insurance: [employer] (60 Vs, HARD MAX, combo PT/OT/ST)  Medical Diagnosis:   M75.82 (ICD-10-CM) - Tendonitis of left rotator cuff   M25.512 (ICD-10-CM) - Trigger point of left shoulder region   Rehab Codes: M75.82, M25.512, M62.81, M54.2  Onset date: 2024 referral                          Next Dr's appt.: 2024  Visit# / total visits: 2  Cancels/No Shows: 0/0    Subjective:      Patient reports today that L shoulder feels constantly sore. Reported some pain with isometrics into ER for HEP, while other motions just fatiguing. Noted no significant change in ROM since eval.     Pain:  [x] Yes  [] No Location: L Superior Shoulder  Pain Rating: (0-10 scale) 4/10  Pain altered Tx:  [] No  [] Yes  Action:  Comments:    Objective:  Modalities:   Precautions: standard; impaired tolerance to L shoulder active mobility   Exercises:  Exercise Reps/ Time Weight/ Level Completed  Today Comments   Manual: Grade III/IV A/P, Inferior mobs L Shoulder 8 min  x           Supine AAROM Flexion/ER with Dowel 15x ea  x           Side-lying ER 10x  x           Scapular Retractions 15x 5\" hold  x           ER/IR Isometric Walkouts 10x Red x    Rows/Extensions 10x2 Green x    Other:    Specific Instructions for next treatment:  - assess response to HEP  - cervical mobility  - progress L shoulder mobility and strength based on tolerance  - modalities as needed for pain control      Assessment: [x] Progressing toward goals. Initiated session with manual to improve end range stiffness in L shoulder within respective planes of PROM performed. Notable improvement in stiffness, but patient reported increased pain towards end ranges. Continued treatment with exercises focused on L shoulder

## 2024-08-20 ENCOUNTER — HOSPITAL ENCOUNTER (OUTPATIENT)
Dept: PHYSICAL THERAPY | Age: 34
Setting detail: THERAPIES SERIES
Discharge: HOME | End: 2024-08-20
Payer: COMMERCIAL

## 2024-08-20 PROCEDURE — 97140 MANUAL THERAPY 1/> REGIONS: CPT

## 2024-08-20 PROCEDURE — 97110 THERAPEUTIC EXERCISES: CPT

## 2024-08-20 NOTE — FLOWSHEET NOTE
increase L shoulder strength to 4/5 in order to improve tolerance to heavier daily activities.  Pt will demonstrate improved functional activity tolerance as evident by an improved score on the UEFI to < 40% functional impairment.                     Patient goals: \"to be able to use my arm and gain strength back\"    Pt. Education:  [x] Yes  [] No  [x] Reviewed Prior HEP/Ed  Method of Education: [x] Verbal  [x] Demo  [] Written  Comprehension of Education:  [x] Verbalizes understanding.  [] Demonstrates understanding.  [] Needs review.  [x] Demonstrates/verbalizes HEP/Ed previously given.     Access Code: [email or web address]  URL: [email or web address]  Date: 08/12/2024  Prepared by: Elva Osborn     Exercises  - Supine Shoulder External Rotation with Dowel  - 1 x daily - 7 x weekly - 1 sets - 10 reps - 5 hold  - Seated Shoulder Flexion Towel Slide at Table Top  - 1 x daily - 7 x weekly - 1 sets - 10 reps - 5 hold  - Seated Shoulder Scaption Slide at Table Top with Forearm in Neutral  - 1 x daily - 7 x weekly - 1 sets - 10 reps - 5 hold  - Standing Isometric Shoulder Flexion with Doorway - Arm Bent  - 1 x daily - 7 x weekly - 1 sets - 5-10 reps - 5 hold  - Isometric Shoulder Extension at Wall  - 1 x daily - 7 x weekly - 1 sets - 5-10 reps - 5 hold  - Standing Isometric Shoulder External Rotation with Doorway  - 1 x daily - 7 x weekly - 1 sets - 5-10 reps - 5 hold  - Standing Isometric Shoulder Internal Rotation at Doorway  - 1 x daily - 7 x weekly - 1 sets - 5-10 reps - 5 hold  - Isometric Shoulder Abduction at Wall  - 1 x daily - 7 x weekly - 1 sets - 5-10 reps - 5 hold    8/14: verbally added supine AAROM flexion    Plan: [x] Continue per plan of care.   [] Other:      Treatment Charges: Mins Units   []  Modalities     []  Ther Exercise 36 2   [x]  Manual Therapy 8 1   []  Ther Activities     []  Aquatics     []  Neuromuscular     [] Vasocompression     [] Gait Training     [] Dry needling        [] 1 or 2 muscles

## 2024-08-22 ENCOUNTER — HOSPITAL ENCOUNTER (OUTPATIENT)
Dept: PHYSICAL THERAPY | Age: 34
Setting detail: THERAPIES SERIES
Discharge: HOME | End: 2024-08-22
Payer: COMMERCIAL

## 2024-08-22 NOTE — FLOWSHEET NOTE
[facility]   Outpatient Rehabilitation & Therapy  [address]  P: [phone]   F: [phone]     Physical Therapy Cancel/No Show note    Date: 2024  Patient: Nita Martinez  : 1990  MRN: [id number]    Visit Count: 3/12  Cancels/No Shows to date:     For today's appointment patient:    [x]  Cancelled    [] Rescheduled appointment    [] No-show     Reason given by patient:    [x]  Patient ill    []  Conflicting appointment    [] No transportation      [] Conflict with work    [] No reason given    [] Weather related    [] COVID-19    [] Other:      Comments:        [] Next appointment was confirmed    Electronically signed by: Glenn Hyde PTA

## 2024-08-26 ENCOUNTER — HOSPITAL ENCOUNTER (OUTPATIENT)
Dept: PHYSICAL THERAPY | Age: 34
Setting detail: THERAPIES SERIES
Discharge: HOME | End: 2024-08-26
Payer: COMMERCIAL

## 2024-08-26 PROCEDURE — 97110 THERAPEUTIC EXERCISES: CPT

## 2024-08-26 PROCEDURE — 97140 MANUAL THERAPY 1/> REGIONS: CPT

## 2024-08-26 NOTE — FLOWSHEET NOTE
[facility]   Outpatient Rehabilitation & Therapy  [address]  P: [phone]   F: [phone]    Physical Therapy Daily Treatment Note      Date:  2024  Patient Name:  Nita Martinez    :  1990  MRN: [id number]  Insurance: [employer] (60 Vs, HARD MAX, combo PT/OT/ST)  Medical Diagnosis:   M75.82 (ICD-10-CM) - Tendonitis of left rotator cuff   M25.512 (ICD-10-CM) - Trigger point of left shoulder region   Rehab Codes: M75.82, M25.512, M62.81, M54.2  Onset date: 2024 referral                          Next Dr's appt.: 2024  Visit# / total visits:   Cancels/No Shows:     Subjective:      Patient arrives without complaints of pain. She continues to note that her shoulder is feeling a lot better.    Pain:  [] Yes  [x] No Location: L Superior Shoulder  Pain Rating: (0-10 scale) 0/10  Pain altered Tx:  [x] No  [] Yes  Action:  Comments:    Objective:  Modalities:   Precautions: standard; impaired tolerance to L shoulder active mobility   Exercises:  Exercise Reps/ Time Weight/ Level Completed  Today Comments   Manual: Grade III/IV A/P, Inferior mobs L Shoulder 8 min  x           Supine AAROM Flexion/ER/ABD 15x ea 2# bar x Added abd    Supine flexion 10x  x Added           Side-lying ER 10x2  x Added set 8/20          Prone Y,T 10x  x Partial range per tolerance  Added 820   Prone Row,Extension 10x  x Added 8                 Scapular Retractions 15x 5\" hold             ER/IR Isometric Walkouts 10x2 Red x Added set 8/20   Rows/Extensions 10x2 Green x    Other:    Specific Instructions for next treatment:  - assess response to HEP  - cervical mobility  - progress L shoulder mobility and strength based on tolerance  - modalities as needed for pain control      Assessment: [x] Progressing toward goals. Continued to begin treatment session with manual therapy to improve mobility prior to exercises charted above. Added supine flexion this date with cues to stay in  strength back\"    Pt. Education:  [x] Yes  [] No  [x] Reviewed Prior HEP/Ed  Method of Education: [x] Verbal  [x] Demo  [] Written  Comprehension of Education:  [x] Verbalizes understanding.  [] Demonstrates understanding.  [] Needs review.  [x] Demonstrates/verbalizes HEP/Ed previously given.     Access Code: [email or web address]  URL: [email or web address]  Date: 08/12/2024  Prepared by: Elva Osborn     Exercises  - Supine Shoulder External Rotation with Dowel  - 1 x daily - 7 x weekly - 1 sets - 10 reps - 5 hold  - Seated Shoulder Flexion Towel Slide at Table Top  - 1 x daily - 7 x weekly - 1 sets - 10 reps - 5 hold  - Seated Shoulder Scaption Slide at Table Top with Forearm in Neutral  - 1 x daily - 7 x weekly - 1 sets - 10 reps - 5 hold  - Standing Isometric Shoulder Flexion with Doorway - Arm Bent  - 1 x daily - 7 x weekly - 1 sets - 5-10 reps - 5 hold  - Isometric Shoulder Extension at Wall  - 1 x daily - 7 x weekly - 1 sets - 5-10 reps - 5 hold  - Standing Isometric Shoulder External Rotation with Doorway  - 1 x daily - 7 x weekly - 1 sets - 5-10 reps - 5 hold  - Standing Isometric Shoulder Internal Rotation at Doorway  - 1 x daily - 7 x weekly - 1 sets - 5-10 reps - 5 hold  - Isometric Shoulder Abduction at Wall  - 1 x daily - 7 x weekly - 1 sets - 5-10 reps - 5 hold    8/14: verbally added supine AAROM flexion    Plan: [x] Continue per plan of care.   [] Other:      Treatment Charges: Mins Units   []  Modalities     [x]  Ther Exercise 32 2   [x]  Manual Therapy 8 1   []  Ther Activities     []  Aquatics     []  Neuromuscular     [] Vasocompression     [] Gait Training     [] Dry needling        [] 1 or 2 muscles        [] 3 or more muscles     []  Other     Total Billable time 40 3     Time In: 8:45 am            Time Out: 9:26 am    Electronically signed by:  Elva Osborn, PT

## 2024-08-28 ENCOUNTER — HOSPITAL ENCOUNTER (OUTPATIENT)
Dept: PHYSICAL THERAPY | Age: 34
Setting detail: THERAPIES SERIES
Discharge: HOME | End: 2024-08-28
Payer: COMMERCIAL

## 2024-08-28 PROCEDURE — 97110 THERAPEUTIC EXERCISES: CPT

## 2024-08-28 PROCEDURE — 97140 MANUAL THERAPY 1/> REGIONS: CPT

## 2024-08-28 NOTE — FLOWSHEET NOTE
[facility]   Outpatient Rehabilitation & Therapy  [address] [facility] [address]  P: [phone]   F: [phone]    Physical Therapy Daily Treatment Note      Date:  2024  Patient Name:  Nita Martinez    :  1990  MRN: [id number]  Insurance: [employer] (60 Vs, HARD MAX, combo PT/OT/ST)  Medical Diagnosis:   M75.82 (ICD-10-CM) - Tendonitis of left rotator cuff   M25.512 (ICD-10-CM) - Trigger point of left shoulder region   Rehab Codes: M75.82, M25.512, M62.81, M54.2  Onset date: 2024 referral                          Next Dr's appt.: 2024  Visit# / total visits:   Cancels/No Shows: 1/0    Subjective:    Patient reporting no changes since last session. States she just got off work and her shoulder is feeling fatigued, but not painful.     Pain:  [] Yes  [x] No Location: L Superior Shoulder  Pain Rating: (0-10 scale) 0/10  Pain altered Tx:  [] No  [] Yes  Action:  Comments:    Objective:  Modalities:   Precautions: standard; impaired tolerance to L shoulder active mobility   Exercises:  Exercise Reps/ Time Weight/ Level Completed  Today Comments   Manual: Grade III/IV A/P, Inferior mobs L Shoulder 8 min  x           Supine AAROM Flexion/ER/ABD 15x3'' ea 2# bar x Added abd    Supine flexion 10x  x Added           Side-lying ER 10x2  x Added set           Prone Y,T 10x2  x Partial range per tolerance   inc reps    Prone Row,Extension 10x2  x  inc reps                 Scapular Retractions 15x 5\" hold  x           ER/IR Isometric Walkouts 10x2 Red x Added set    Rows/Extensions 10x2 Green x    Other:    Specific Instructions for next treatment:  - assess response to HEP  - cervical mobility  - progress L shoulder mobility and strength based on tolerance  - modalities as needed for pain control      Assessment: [x] Progressing toward goals. Began session with manual interventions to improve mobility. Continued with strengthening exercises as documented above, increasing

## 2024-09-03 ENCOUNTER — HOSPITAL ENCOUNTER (OUTPATIENT)
Dept: PHYSICAL THERAPY | Age: 34
Setting detail: THERAPIES SERIES
Discharge: HOME | End: 2024-09-03
Payer: COMMERCIAL

## 2024-09-03 PROCEDURE — 97110 THERAPEUTIC EXERCISES: CPT

## 2024-09-03 NOTE — FLOWSHEET NOTE
reps  - Prone Shoulder Extension - Single Arm  - 1 x daily - 7 x weekly - 3 sets - 10 reps  - Prone Shoulder Row  - 1 x daily - 7 x weekly - 3 sets - 10 reps    Plan: [x] Continue per plan of care.   [] Other:      Treatment Charges: Mins Units   []  Modalities     [x]  Ther Exercise 40 3   [x]  Manual Therapy 6 0   []  Ther Activities     []  Aquatics     []  Neuromuscular     [] Vasocompression     [] Gait Training     [] Dry needling        [] 1 or 2 muscles        [] 3 or more muscles     []  Other     Total Billable time 46 3     Time In: 8:52 am           Time Out: 9:38 am    Electronically signed by:  Glenn Hyde PTA

## 2024-09-05 ENCOUNTER — HOSPITAL ENCOUNTER (OUTPATIENT)
Dept: PHYSICAL THERAPY | Age: 34
Setting detail: THERAPIES SERIES
Discharge: HOME | End: 2024-09-05
Payer: COMMERCIAL

## 2024-09-05 PROCEDURE — 97110 THERAPEUTIC EXERCISES: CPT

## 2024-09-05 PROCEDURE — 97140 MANUAL THERAPY 1/> REGIONS: CPT

## 2024-09-05 NOTE — FLOWSHEET NOTE
[facility]   Outpatient Rehabilitation & Therapy  [address]  P: [phone]   F: [phone]    Physical Therapy Daily Treatment Note      Date:  2024  Patient Name:  Nita Martinez    :  1990  MRN: [id number]  Insurance: [employer] (60 Vs, HARD MAX, combo PT/OT/ST)  Medical Diagnosis:   M75.82 (ICD-10-CM) - Tendonitis of left rotator cuff   M25.512 (ICD-10-CM) - Trigger point of left shoulder region   Rehab Codes: M75.82, M25.512, M62.81, M54.2  Onset date: 2024 referral                          Next Dr's appt.: 2024  Visit# / total visits:   Cancels/No Shows: 1/0    Subjective:    Patient reports mild R shoulder achiness with overhead and reaching to side motions.      Pain:  [] Yes  [x] No Location: L Superior Shoulder  Pain Rating: (0-10 scale) 3/10  Pain altered Tx:  [] No  [x] Yes  Action: Abduction/Flexion  Comments:    Objective:  Modalities:   Precautions: standard; impaired tolerance to L shoulder active mobility   Exercises:  Exercise Reps/ Time Weight/ Level Completed  Today Comments   Manual: Grade III/IV A/P, Inferior mobs L Shoulder 10 min  x           Supine AAROM Flexion/ER/ABD 15x3'' ea 2# bar x Added abd    Supine flexion 10x  x Added           Side-lying ER 10x2  x Added set           Prone Y,T 10x2  x Partial range per tolerance   inc reps    Prone Row,Extension 10x2  x  inc reps                 Scapular Retractions 15x 5\" hold  x           ER/IR Isometric Walkouts 10x2 Red x Added set    Rows/Extensions 15x2 Green x    Other:    Specific Instructions for next treatment:  - assess response to HEP  - cervical mobility  - progress L shoulder mobility and strength based on tolerance  - modalities as needed for pain control      Assessment: [x] Progressing toward goals. Initiated treatment with Manual and Mobilization of L shoulder.  Patient demo improved ROM with limitation achieving end range flexion and abduction.  Patient

## 2024-09-09 ENCOUNTER — HOSPITAL ENCOUNTER (OUTPATIENT)
Dept: PHYSICAL THERAPY | Age: 34
Setting detail: THERAPIES SERIES
Discharge: HOME | End: 2024-09-09
Payer: COMMERCIAL

## 2024-09-09 ENCOUNTER — OFFICE VISIT (OUTPATIENT)
Dept: ORTHOPEDIC SURGERY | Age: 34
End: 2024-09-09
Payer: COMMERCIAL

## 2024-09-09 VITALS — RESPIRATION RATE: 14 BRPM | BODY MASS INDEX: 29.02 KG/M2 | WEIGHT: 170 LBS | HEIGHT: 64 IN

## 2024-09-09 DIAGNOSIS — M75.82 TENDONITIS OF LEFT ROTATOR CUFF: Primary | ICD-10-CM

## 2024-09-09 PROCEDURE — 20610 DRAIN/INJ JOINT/BURSA W/O US: CPT

## 2024-09-09 PROCEDURE — 97140 MANUAL THERAPY 1/> REGIONS: CPT

## 2024-09-09 PROCEDURE — 99214 OFFICE O/P EST MOD 30 MIN: CPT

## 2024-09-09 PROCEDURE — 97110 THERAPEUTIC EXERCISES: CPT

## 2024-09-09 RX ORDER — TRIAMCINOLONE ACETONIDE 40 MG/ML
40 INJECTION, SUSPENSION INTRA-ARTICULAR; INTRAMUSCULAR ONCE
Status: COMPLETED | OUTPATIENT
Start: 2024-09-09 | End: 2024-09-09

## 2024-09-09 RX ORDER — LIDOCAINE HYDROCHLORIDE 10 MG/ML
3 INJECTION, SOLUTION INFILTRATION; PERINEURAL ONCE
Status: COMPLETED | OUTPATIENT
Start: 2024-09-09 | End: 2024-09-09

## 2024-09-09 RX ADMIN — TRIAMCINOLONE ACETONIDE 40 MG: 40 INJECTION, SUSPENSION INTRA-ARTICULAR; INTRAMUSCULAR at 11:59

## 2024-09-09 RX ADMIN — LIDOCAINE HYDROCHLORIDE 3 ML: 10 INJECTION, SOLUTION INFILTRATION; PERINEURAL at 11:58

## 2024-09-11 ENCOUNTER — HOSPITAL ENCOUNTER (OUTPATIENT)
Dept: PHYSICAL THERAPY | Age: 34
Setting detail: THERAPIES SERIES
Discharge: HOME | End: 2024-09-11
Payer: COMMERCIAL

## 2024-09-11 ENCOUNTER — APPOINTMENT (OUTPATIENT)
Dept: PHYSICAL THERAPY | Age: 34
End: 2024-09-11
Payer: COMMERCIAL

## 2024-09-12 ENCOUNTER — HOSPITAL ENCOUNTER (EMERGENCY)
Age: 34
Discharge: HOME | End: 2024-09-12
Attending: EMERGENCY MEDICINE
Payer: COMMERCIAL

## 2024-09-12 VITALS
HEART RATE: 90 BPM | TEMPERATURE: 97.7 F | RESPIRATION RATE: 18 BRPM | SYSTOLIC BLOOD PRESSURE: 136 MMHG | OXYGEN SATURATION: 99 % | DIASTOLIC BLOOD PRESSURE: 83 MMHG

## 2024-09-12 DIAGNOSIS — J02.9 VIRAL PHARYNGITIS: Primary | ICD-10-CM

## 2024-09-12 LAB
SPECIMEN SOURCE: NORMAL
STREP A, MOLECULAR: NEGATIVE

## 2024-09-12 PROCEDURE — 87651 STREP A DNA AMP PROBE: CPT

## 2024-09-12 PROCEDURE — 6370000000 HC RX 637 (ALT 250 FOR IP): Performed by: PHYSICIAN ASSISTANT

## 2024-09-12 PROCEDURE — 99283 EMERGENCY DEPT VISIT LOW MDM: CPT

## 2024-09-12 RX ORDER — IBUPROFEN 800 MG/1
800 TABLET, FILM COATED ORAL EVERY 8 HOURS PRN
Qty: 30 TABLET | Refills: 0 | Status: SHIPPED | OUTPATIENT
Start: 2024-09-12

## 2024-09-12 RX ORDER — ACETAMINOPHEN 500 MG
1000 TABLET ORAL EVERY 6 HOURS PRN
Qty: 60 TABLET | Refills: 0 | Status: SHIPPED | OUTPATIENT
Start: 2024-09-12

## 2024-09-12 RX ORDER — IBUPROFEN 800 MG/1
800 TABLET, FILM COATED ORAL ONCE
Status: COMPLETED | OUTPATIENT
Start: 2024-09-12 | End: 2024-09-12

## 2024-09-12 RX ORDER — ACETAMINOPHEN 500 MG
1000 TABLET ORAL ONCE
Status: COMPLETED | OUTPATIENT
Start: 2024-09-12 | End: 2024-09-12

## 2024-09-12 RX ADMIN — ACETAMINOPHEN 1000 MG: 500 TABLET ORAL at 13:10

## 2024-09-12 RX ADMIN — IBUPROFEN 800 MG: 800 TABLET, FILM COATED ORAL at 13:10

## 2024-09-12 ASSESSMENT — PAIN DESCRIPTION - LOCATION: LOCATION: THROAT

## 2024-09-12 ASSESSMENT — PAIN SCALES - GENERAL: PAINLEVEL_OUTOF10: 3

## 2024-09-17 ENCOUNTER — HOSPITAL ENCOUNTER (OUTPATIENT)
Dept: PHYSICAL THERAPY | Age: 34
Setting detail: THERAPIES SERIES
Discharge: HOME | End: 2024-09-17
Payer: COMMERCIAL

## 2024-09-17 PROCEDURE — 97140 MANUAL THERAPY 1/> REGIONS: CPT

## 2024-09-17 PROCEDURE — 97110 THERAPEUTIC EXERCISES: CPT

## 2024-09-23 ENCOUNTER — HOSPITAL ENCOUNTER (OUTPATIENT)
Dept: PHYSICAL THERAPY | Age: 34
Setting detail: THERAPIES SERIES
Discharge: HOME | End: 2024-09-23
Payer: COMMERCIAL

## 2024-09-23 PROCEDURE — 97110 THERAPEUTIC EXERCISES: CPT

## 2024-09-25 ENCOUNTER — HOSPITAL ENCOUNTER (OUTPATIENT)
Dept: PHYSICAL THERAPY | Age: 34
Setting detail: THERAPIES SERIES
Discharge: HOME | End: 2024-09-25
Payer: COMMERCIAL

## 2024-09-25 PROCEDURE — 97110 THERAPEUTIC EXERCISES: CPT

## 2025-01-08 ENCOUNTER — OFFICE VISIT (OUTPATIENT)
Dept: ORTHOPEDIC SURGERY | Age: 35
End: 2025-01-08
Payer: COMMERCIAL

## 2025-01-08 VITALS — RESPIRATION RATE: 14 BRPM | WEIGHT: 173 LBS | BODY MASS INDEX: 29.53 KG/M2 | HEIGHT: 64 IN

## 2025-01-08 DIAGNOSIS — M25.512 CHRONIC LEFT SHOULDER PAIN: ICD-10-CM

## 2025-01-08 DIAGNOSIS — M75.82 ROTATOR CUFF TENDONITIS, LEFT: Primary | ICD-10-CM

## 2025-01-08 DIAGNOSIS — G89.29 CHRONIC LEFT SHOULDER PAIN: ICD-10-CM

## 2025-01-08 PROCEDURE — 99213 OFFICE O/P EST LOW 20 MIN: CPT

## 2025-01-08 NOTE — PROGRESS NOTES
HPI: Ms. Martinez is a 34-year-old female who presented clinic today for follow-up regarding left shoulder pain.  I last saw the patient on 9/9/2024 where she was diagnosed with a likely rotator cuff tendinitis.  We proceeded at that time with a subacromial space injection and formal physical therapy  She states that the injection worked very well and was having really no difficulty with the shoulder until about 1 month ago.  States she got about 3 months of complete relief from that injection.  She states that therapy did help her functionally with regaining some of her range of motion but never helped with diminishing her pain.  Has taken Aleve and Tylenol at this point which has not been as effective for pain relief.  On examination of the patient's left shoulder and upper extremity today she does have approximately 140 degrees of active forward elevation, 130 degrees of active abduction, 55 degrees of external rotation.  She does have 5/5 muscle strength of the deltoid, external rotation and internal rotation.  4/5 with pain when testing the supraspinatus.  She is tender to palpation over the anterior lateral corner.  No AC joint or posterior shoulder tenderness.    Impression/plan: Ms. Martinez is a 34-year-old female who presented clinic today for follow-up regarding left shoulder pain.  At this point it does appear she is still dealing with a rotator cuff etiology.  Given the fact she did respond well to the subacromial injection although ultimately the pain did return I would recommend proceeding at this time with an MRI of the left shoulder to rule out any tear or internal structural damage to the left shoulder.  I will follow-up with the patient after the MRI study is completed to discuss treatment options as well as review her imaging.  Patient is amenable to plan and an order for the MRI was provided to her in clinic today.  She was encouraged to contact our office with any questions or concerns that she

## 2025-01-15 ENCOUNTER — HOSPITAL ENCOUNTER (OUTPATIENT)
Dept: MRI IMAGING | Age: 35
Discharge: HOME | End: 2025-01-17
Payer: COMMERCIAL

## 2025-01-15 DIAGNOSIS — M75.82 ROTATOR CUFF TENDONITIS, LEFT: ICD-10-CM

## 2025-01-15 DIAGNOSIS — G89.29 CHRONIC LEFT SHOULDER PAIN: ICD-10-CM

## 2025-01-15 DIAGNOSIS — M25.512 CHRONIC LEFT SHOULDER PAIN: ICD-10-CM

## 2025-01-15 PROCEDURE — 73221 MRI JOINT UPR EXTREM W/O DYE: CPT

## 2025-01-23 ENCOUNTER — OFFICE VISIT (OUTPATIENT)
Dept: ORTHOPEDIC SURGERY | Age: 35
End: 2025-01-23

## 2025-01-23 DIAGNOSIS — M75.82 TENDONITIS OF LEFT ROTATOR CUFF: Primary | ICD-10-CM

## 2025-01-23 DIAGNOSIS — G89.29 CHRONIC LEFT SHOULDER PAIN: ICD-10-CM

## 2025-01-23 DIAGNOSIS — M25.512 CHRONIC LEFT SHOULDER PAIN: ICD-10-CM

## 2025-01-23 RX ORDER — TRIAMCINOLONE ACETONIDE 40 MG/ML
40 INJECTION, SUSPENSION INTRA-ARTICULAR; INTRAMUSCULAR ONCE
Status: COMPLETED | OUTPATIENT
Start: 2025-01-23 | End: 2025-01-23

## 2025-01-23 RX ORDER — LIDOCAINE HYDROCHLORIDE 10 MG/ML
3 INJECTION, SOLUTION INFILTRATION; PERINEURAL ONCE
Status: COMPLETED | OUTPATIENT
Start: 2025-01-23 | End: 2025-01-23

## 2025-01-23 RX ADMIN — TRIAMCINOLONE ACETONIDE 40 MG: 40 INJECTION, SUSPENSION INTRA-ARTICULAR; INTRAMUSCULAR at 16:02

## 2025-01-23 RX ADMIN — LIDOCAINE HYDROCHLORIDE 3 ML: 10 INJECTION, SOLUTION INFILTRATION; PERINEURAL at 16:02

## 2025-01-23 NOTE — PROGRESS NOTES
HPI: Ms. Martinez is a 35-year-old female who presented to clinic today for follow-up regarding her left shoulder and to review an MRI.  She states that the shoulder is still bothering her intermittently.  States she has gotten good response in the past from her cortisone injections as well as therapy.  She states that she believes the pain is continuing to linger due to the fact that she does not have much time to rest that she did buy a fixer upper as well as working 12-hour days.  We did review her MRI in office today and it does show evidence of tendinosis in the supraspinatus and infraspinatus tendons.  No full-thickness rotator cuff tears.  Subscapularis and teres minor are intact without abnormality.  Biceps properly located in the bicipital groove with no significant surrounding edema.  She does have what appears to be a small focal tear at the posterior labral junction although difficult to fully assess with this study without contrast.  Well-maintained  chondral surfaces throughout the shoulder.    Impression/plan: Ms. Martinez is a 35-year-old female who presented to clinic today for follow-up regarding her left shoulder.  I did discuss with her that her MRI is overall did not show any evidence for any surgical intervention at this time.  Consequently, I did recommend proceeding conservatively at this time and she would like to repeat a subacromial space cortisone injection today and I do think this is reasonable given her response to the previous injection that was provided to her.  I did also encourage her to  on her exercises that she learned in physical therapy.  I did administer the injection in clinic today as outlined below.  Patient is amenable to plan as outlined above and I will see the patient back in clinic on an as needed basis but she was encouraged to contact our office with any questions or concerns that she may have.    Procedure: Left shoulder subacromial space injection  Following

## 2025-02-17 ENCOUNTER — HOSPITAL ENCOUNTER (OUTPATIENT)
Age: 35
Setting detail: SPECIMEN
Discharge: HOME | End: 2025-02-17

## 2025-02-17 LAB
CLOSURE TME COLL+ADP BLD: 107 SEC (ref 67–112)
COLLAGEN EPINEPHRINE TIME: 150 SEC (ref 85–172)
ERYTHROCYTE [DISTWIDTH] IN BLOOD BY AUTOMATED COUNT: 12.7 % (ref 11.8–14.4)
HCT VFR BLD AUTO: 39 % (ref 36.3–47.1)
HGB BLD-MCNC: 12.5 G/DL (ref 11.9–15.1)
MCH RBC QN AUTO: 30.6 PG (ref 25.2–33.5)
MCHC RBC AUTO-ENTMCNC: 32.1 G/DL (ref 28.4–34.8)
MCV RBC AUTO: 95.4 FL (ref 82.6–102.9)
NRBC BLD-RTO: 0 PER 100 WBC
PLATELET # BLD AUTO: 275 K/UL (ref 138–453)
PLATELET FUNCTION INTERP: NORMAL
PMV BLD AUTO: 11.3 FL (ref 8.1–13.5)
RBC # BLD AUTO: 4.09 M/UL (ref 3.95–5.11)
WBC OTHER # BLD: 6.9 K/UL (ref 3.5–11.3)

## 2025-03-03 ENCOUNTER — HOSPITAL ENCOUNTER (OUTPATIENT)
Age: 35
Setting detail: SPECIMEN
Discharge: HOME | End: 2025-03-03

## 2025-03-06 LAB — SURGICAL PATHOLOGY REPORT: NORMAL

## (undated) DEVICE — YANKAUER,BULB TIP,W/O VENT,RIGID,STERILE: Brand: MEDLINE

## (undated) DEVICE — COUNTER NDL 10 COUNT HLD 20 FOAM BLK SGL MAG

## (undated) DEVICE — GLOVE SURG BEAD CUF 7 STD PF WHT STRL TRIUMPH LT LTX

## (undated) DEVICE — GOWN,AURORA,NONREINFORCED,LARGE: Brand: MEDLINE

## (undated) DEVICE — LEGGINGS, PAIR, CLEAR, STERILE: Brand: MEDLINE

## (undated) DEVICE — GLOVE SURG SZ 7 L12IN FNGR THK13MIL BRN LTX SYN POLYMER W

## (undated) DEVICE — GLOVE SURG 7 PF POLYMER COAT WHT STRL SIGN LTX ESSENTIAL LTX

## (undated) DEVICE — SINGLE PORT MANIFOLD: Brand: NEPTUNE 2

## (undated) DEVICE — GOWN,SURGICAL,AURORA,SLEEVE: Brand: MEDLINE

## (undated) DEVICE — 1200CC GUARDIAN II: Brand: GUARDIAN

## (undated) DEVICE — INTENT TO BE USED WITH SUTURE MATERIAL FOR TISSUE CLOSURE: Brand: RICHARD-ALLAN® NEEDLE 1/2 CIRCLE TAPER

## (undated) DEVICE — TRAY SPNL 24GA L4IN PENCAN PNCL PNT NDL 0.75% BIPIVCAIN W/

## (undated) DEVICE — MERCY HEALTH ST CHARLES: Brand: MEDLINE INDUSTRIES, INC.

## (undated) DEVICE — SUTURE MERS 5MM TAPE L12IN NONABSORBABLE WHT L48MM CTX 1/2 RS22

## (undated) DEVICE — ST CHARLES PERI-GYN PACK: Brand: MEDLINE INDUSTRIES, INC.

## (undated) DEVICE — 1810 FOAM BLOCK NEEDLE COUNTER: Brand: DEVON

## (undated) DEVICE — SUTURE ETHBND EXCEL SZ 5 L30IN NONABSORBABLE GRN L48MM V-40 MB46G